# Patient Record
Sex: MALE | Race: OTHER | Employment: FULL TIME | URBAN - METROPOLITAN AREA
[De-identification: names, ages, dates, MRNs, and addresses within clinical notes are randomized per-mention and may not be internally consistent; named-entity substitution may affect disease eponyms.]

---

## 2018-05-24 PROBLEM — E66.01 OBESITY, MORBID (HCC): Status: ACTIVE | Noted: 2018-05-24

## 2019-10-13 ENCOUNTER — HOSPITAL ENCOUNTER (OUTPATIENT)
Dept: LAB | Age: 49
Discharge: HOME | End: 2019-10-13
Payer: COMMERCIAL

## 2019-10-13 LAB
ALBUMIN SERPL-MCNC: 3.6 G/DL (ref 3.5–5)
ALBUMIN/GLOB SERPL: 0.9 {RATIO} (ref 1.2–3.5)
ALP SERPL-CCNC: 85 U/L (ref 50–136)
ALT SERPL-CCNC: 59 U/L (ref 12–65)
ANION GAP SERPL CALC-SCNC: 3 MMOL/L (ref 7–16)
AST SERPL-CCNC: 32 U/L (ref 15–37)
BILIRUB SERPL-MCNC: 0.4 MG/DL (ref 0.2–1.1)
BUN SERPL-MCNC: 15 MG/DL (ref 6–23)
CALCIUM SERPL-MCNC: 8.2 MG/DL (ref 8.3–10.4)
CHLORIDE SERPL-SCNC: 108 MMOL/L (ref 98–107)
CHOLEST SERPL-MCNC: 126 MG/DL
CO2 SERPL-SCNC: 27 MMOL/L (ref 21–32)
CREAT SERPL-MCNC: 0.74 MG/DL (ref 0.8–1.5)
EST. AVERAGE GLUCOSE BLD GHB EST-MCNC: 134 MG/DL
GLOBULIN SER CALC-MCNC: 3.8 G/DL (ref 2.3–3.5)
GLUCOSE SERPL-MCNC: 115 MG/DL (ref 65–100)
HBA1C MFR BLD: 6.3 %
HDLC SERPL-MCNC: 30 MG/DL (ref 40–60)
HDLC SERPL: 4.2 {RATIO}
LDLC SERPL CALC-MCNC: 71 MG/DL
LIPID PROFILE,FLP: ABNORMAL
POTASSIUM SERPL-SCNC: 4.1 MMOL/L (ref 3.5–5.1)
PROT SERPL-MCNC: 7.4 G/DL (ref 6.3–8.2)
SODIUM SERPL-SCNC: 138 MMOL/L (ref 136–145)
TRIGL SERPL-MCNC: 125 MG/DL (ref 35–150)
TSH SERPL DL<=0.005 MIU/L-ACNC: 1.65 UIU/ML
VLDLC SERPL CALC-MCNC: 25 MG/DL (ref 6–23)

## 2019-10-13 PROCEDURE — 84443 ASSAY THYROID STIM HORMONE: CPT

## 2019-10-13 PROCEDURE — 36415 COLL VENOUS BLD VENIPUNCTURE: CPT

## 2019-10-13 PROCEDURE — 80061 LIPID PANEL: CPT

## 2019-10-13 PROCEDURE — 83036 HEMOGLOBIN GLYCOSYLATED A1C: CPT

## 2019-10-13 PROCEDURE — 80053 COMPREHEN METABOLIC PANEL: CPT

## 2019-10-22 ENCOUNTER — HOSPITAL ENCOUNTER (OUTPATIENT)
Dept: GENERAL RADIOLOGY | Age: 49
Discharge: HOME | End: 2019-10-22

## 2019-10-22 DIAGNOSIS — M25.511 ACUTE PAIN OF RIGHT SHOULDER: ICD-10-CM

## 2020-08-05 ENCOUNTER — HOSPITAL ENCOUNTER (OUTPATIENT)
Dept: GENERAL RADIOLOGY | Age: 50
Discharge: HOME | End: 2020-08-05
Attending: SURGERY

## 2020-10-20 ENCOUNTER — HOSPITAL ENCOUNTER (OUTPATIENT)
Dept: PHYSICAL THERAPY | Age: 50
Discharge: HOME | End: 2020-10-20
Attending: SURGERY
Payer: COMMERCIAL

## 2020-10-20 DIAGNOSIS — G47.33 OSA (OBSTRUCTIVE SLEEP APNEA): ICD-10-CM

## 2020-10-20 DIAGNOSIS — R73.03 PREDIABETES: ICD-10-CM

## 2020-10-20 DIAGNOSIS — Z99.89 CPAP (CONTINUOUS POSITIVE AIRWAY PRESSURE) DEPENDENCE: ICD-10-CM

## 2020-10-20 DIAGNOSIS — E66.01 MORBID OBESITY DUE TO EXCESS CALORIES (HCC): ICD-10-CM

## 2020-10-20 PROCEDURE — 97161 PT EVAL LOW COMPLEX 20 MIN: CPT

## 2020-10-20 NOTE — THERAPY EVALUATION
Wolf Gomez  : 1970  Primary: Tracy Joel Of Keyur Yarbrough*  Secondary:  [facility], [address] [place] [address],[address], [place] [address].  [phone]   [phone]          OUTPATIENT PHYSICAL THERAPY:Initial Assessment and Discharge Summary 10/20/2020   ICD-10: Treatment Diagnosis: M62.81 Muscle weakness, generalized  Precautions/Allergies:   Patient has no known allergies. TREATMENT PLAN:  Effective Dates: 10/20/2020. Frequency/Duration: 1 visit MEDICAL/REFERRING DIAGNOSIS:  Morbid obesity due to excess calories (HCC) [E66.01]  BMI 50.0-59.9, adult (HCC) [Z68.43]  Prediabetes [R73.03]  JADEN (obstructive sleep apnea) [G47.33]  CPAP (continuous positive airway pressure) dependence [Z99.89]   DATE OF ONSET: Chronic  REFERRING PHYSICIAN: Iqra Acosta*  MD Orders: Evaluate and treat  Return MD Appointment: TBD     INITIAL ASSESSMENT:  Mr. Diamond Tapia presents for physical therapy consultation in preparation for bariatric surgery in order to lose weight and improve his health. Patient demonstrates good mobility and strength, and is in the process of beginning treatment for L ankle pain from an injury earlier in the year. At this time the patient does not appear to exhibit physical deficits which necessitate ongoing PT. PROBLEM LIST (Impacting functional limitations):  1. Decreased Strength  2. Increased Pain  3. Decreased Activity Tolerance  4. Decreased [place] with Home Exercise Program INTERVENTIONS PLANNED: (Treatment may consist of any combination of the following)  1. Home Exercise Program (HEP)  2. Therapeutic Exercise/Strengthening     GOALS: (Goals have been discussed and agreed upon with patient.)  Discharge Goals: Time Frame: 1 visit  1. Patient will be independent with HEP.  MET 10-20-20    OUTCOME MEASURE:   Tool Used: Lower Extremity Functional Scale (LEFS)  Score:  Initial:  Most Recent:  (Date: -- )   Interpretation of Score: 20 questions each scored on a 5 point scale with 0 representing \"extreme difficulty or unable to perform\" and 4 representing \"no difficulty\". The lower the score, the greater the functional disability. 80/80 represents no disability. Minimal detectable change is 9 points. MEDICAL NECESSITY:   · Patient does not demonstrate deficits which require ongoing PT services. REASON FOR SERVICES/OTHER COMMENTS:  · Patient does not appear to need additional PT services at this time. Total Duration: 30 minutes  PT Patient Time In/Time Out  Time In: 1030  Time Out: 1100    Rehabilitation Potential For Stated Goals: Good  Regarding [employer] therapy, I certify that the treatment plan above will be carried out by a therapist or under their direction. Thank you for this referral,  Alexis Romo, PT     Referring Physician Signature: Cheryl Isaac* No Signature is Required for this note. PAIN/SUBJECTIVE:   Initial:   None reported Post Session:  No pain reported   HISTORY:   History of Injury/Illness (Reason for Referral):        Mr. Manan Leigh injured his ankle in June 2020, and has an MRI ordered for his ankle. He reports that it hurts intermittently, and also has some on and off low back pain. Is motivated to have bariatric surgery in order to improve his health. States that he used to exercise regularly but since moving to the [address] [place] [address],[address] from [facility] has not been able to exercise as he used to. Past Medical History/Comorbidities:   Mr. Manan Leigh  has a past medical history of Hypercholesterolemia, Prediabetes, and Sleep apnea. Mr. Manan Leigh  has no past surgical history on file. Social History/Living Environment:    Patient lives with his wife in a [facility] house. Prior Level of Function/Work/Activity:  Patient works full-timein [employer].       Ambulatory/Rehab Services H2 Model Falls Risk Assessment   Risk Factors:       (1)  Gender [Male] Ability to Rise from Chair:       (0)  Ability to rise in a single movement   Falls Prevention Plan:       No modifications necessary   Total: (5 or greater = High Risk): 1   ©2010 [facility] [employer] Patent #2,[id number]. Federal Law prohibits the replication, distribution or use without written permission from [facility] [employer]   Current Medications: Per communication with patient      Current Outpatient Medications:     fluconazole (DIFLUCAN) 150 mg tablet, Take 1 tablet now, may repeat 1 more tablet in 1 week if not clear., Disp: 2 Tab, Rfl: 0.- patient not taking    SITagliptin-metFORMIN (JANUMET)  mg per tablet, Take 1 Tab by mouth two (2) times daily (with meals). Indications: type 2 diabetes mellitus, Disp: 180 Tab, Rfl: 3    acetaminophen (TYLENOL EXTRA STRENGTH) 500 mg tablet, Take  by mouth every six (6) hours as needed for Pain., Disp: , Rfl:     naproxen (NAPROSYN) 500 mg tablet, Take 1 Tab by mouth two (2) times daily as needed for Pain. Indications: pain, Disp: 60 Tab, Rfl: 1    FLUoxetine (PROZAC) 10 mg capsule, Take 1 Cap by mouth daily. Indications: Anxiousness associated with Depression, Disp: 90 Cap, Rfl: 0 - patient not taking    Date Last Reviewed:  10-20-20   Number of Personal Factors/Comorbidities that affect the Plan of Care: 1-2: MODERATE COMPLEXITY   EXAMINATION:   10-    Observation/Orthostatic Postural Assessment:          Patient ambulates without presence of antalgic gait pattern. Performs all transfers independently without the use of hands to stand. Ascends/descends stairs with reciprocal gait pattern without evidence of antalgia. ROM:          B UE and B LE ROM is within functional limits. Low back range of motion is within functional limits with both forward and back bending.    Strength:          B shoulder flexion 5/5        B shoulder ABD 5/5        B elbow flex/ext 5/5        B hip flexion 5/5        B knee ext 5/5        B knee flex 5/5     Body Structures Involved:  1. Nerves  2. Bones  3. Joints  4. Muscles Body Functions Affected:  1. Sensory/Pain  2. Neuromusculoskeletal  3. Movement Related Activities and Participation Affected:  1. General Tasks and Demands  2.  Mobility   Number of elements (examined above) that affect the Plan of Care: 4+: HIGH COMPLEXITY   CLINICAL PRESENTATION:   Presentation: Stable and uncomplicated: LOW COMPLEXITY   CLINICAL DECISION MAKING:   Use of outcome tool(s) and clinical judgement create a POC that gives a: Clear prediction of patient's progress: LOW COMPLEXITY

## 2020-11-23 ENCOUNTER — HOSPITAL ENCOUNTER (OUTPATIENT)
Dept: LAB | Age: 50
Discharge: HOME | End: 2020-11-23
Attending: SURGERY
Payer: COMMERCIAL

## 2020-11-23 DIAGNOSIS — R73.03 PREDIABETES: ICD-10-CM

## 2020-11-23 DIAGNOSIS — G47.33 OSA (OBSTRUCTIVE SLEEP APNEA): ICD-10-CM

## 2020-11-23 DIAGNOSIS — Z99.89 CPAP (CONTINUOUS POSITIVE AIRWAY PRESSURE) DEPENDENCE: ICD-10-CM

## 2020-11-23 DIAGNOSIS — E66.01 MORBID OBESITY DUE TO EXCESS CALORIES (HCC): ICD-10-CM

## 2020-11-23 LAB
EST. AVERAGE GLUCOSE BLD GHB EST-MCNC: 134 MG/DL
HBA1C MFR BLD: 6.3 %

## 2020-11-23 PROCEDURE — 80323 ALKALOIDS NOS: CPT

## 2020-11-23 PROCEDURE — 36415 COLL VENOUS BLD VENIPUNCTURE: CPT

## 2020-11-23 PROCEDURE — 82306 VITAMIN D 25 HYDROXY: CPT

## 2020-11-23 PROCEDURE — 83036 HEMOGLOBIN GLYCOSYLATED A1C: CPT

## 2020-11-24 LAB — 25(OH)D3+25(OH)D2 SERPL-MCNC: 22.9 NG/ML (ref 30–100)

## 2020-11-30 LAB
COTININE SERPL-MCNC: NORMAL NG/ML
NICOTINE SERPL-MCNC: NORMAL NG/ML

## 2020-12-07 NOTE — H&P (VIEW-ONLY)
Jairo Connor MD  
Bariatric & Advanced Laparoscopic Surgery & Endoscopy [address] [facility] [address], [address] Federico Farrell Phone [phone]   Fax [phone] Date of visit: 2020 Primary/Requesting provider: Guss Rubinstein, MD  
 
 
Name: Luna Andrade MRN: [id number] : 1970 Age: 45 y.o. Sex: male PCP: Guss Rubinstein, MD  
 
CC:   
Chief Complaint Patient presents with  Morbid Obesity  New Patient Procedure: laparoscopic sleeve gastrectomy Surgical Consult Date: 2020 Surgical Date: TBD The patient is a 45 y.o. male presenting with a height of 5' 6\" (1.676 m) and weight of 286 lb (129.7 kg), giving him a Body mass index is 46.16 kg/m². He has an Ideal body weight of 145 lbs, and excess body weight of 141 lbs. He started our program with a weight of 315 lbs, losing 29 lbs. He has completed all aspects of our prep program and has been deemed an acceptable candidate for bariatric surgery. 30-Day Bariatric Surgical Risk Percentage: 2.75% PSYCHOLOGICAL EVALUATION:   Completed with JAY Metzger on 2020 deeming him and appropriate surgical candidate. DIETITIAN EVALUATION:  Completed with Yamileth Nuno deeming him an appropriate surgical candidate. BARIATRIC LABS:   
Component Latest Ref Rng & Units 2020  
 
     11:25 AM 11:25 AM 11:00 AM  
Nicotine 
    ng/mL None detected Cotinine 
    ng/mL None detected Hemoglobin A1c, (calculated) 
    %   6.3 Est. average glucose 
    mg/dL   134 Vitamin D 25-Hydroxy 30.0 - 100.0 ng/mL  22.9 (L) Will Collect TSH on Preassessment visit UPPER GI:  Completed 09/10/2020 by [address] [place] [address]. Results scanned into imaging section of chart, Unremarkable UGI. PHYSICAL THERAPY EVALUATION FOR MOBILITY OPTIMIZATION:   Completed 10/20/2020 by Corinne Rodríguez PT, documentation in note section on chart. We have reviewed the procedure again today and completed our standard pre op teaching. His questions were answered and he is ready to schedule surgery. We have discussed the procedure, diet and exercise regimens, and potential complications of surgery. The patient understands the nature and potential complication of surgery and has the capacity to follow the post operative diet, exercise, and nutritional requirements. After review, he has signed his surgical consent today. MEDICAL HISTORY: 
Morbid Obesity Migraines Varicose veins 
  
Comorbidity Yes or No  
Obstructive Sleep Apnea CPAP/BIPAP use=CPAP Yes Diabetes Mellitus Insulin dependent = Last A1c= No-Prediabetes Hypertension- 
Number of medications= No  
Gastroesophageal Reflux Treatment Med= No  
Hyperlipidemia with medication No  
Coronary Artery Disease No  
Cancer No  
Asthma No  
Osteoarthritis No  
Joint Pain No  
  
No GERD. No Dysphagia. No Regurgitation.  
  
Other Yes or No  
Venous Stasis No  
Dialysis No  
Long term use of steroids or immunocompromised conditions No  
On Anticoagulants No  
  
PRIOR WEIGHT LOSS ATTEMPTS:  4-10 attempts including weight loss solutions, bariatric clinic 
  
EXERCISE ASSESSMENT:  None currently, will discuss NIH Guidelines with patient.  
  
DENTAL ISSUES:  No dental issues with chewing, missing or broken teeth 
  
PSYCHOSOCIAL: 
He notes he  is  and states main support person is Obed Leblanc (wife). He is employed as a Operator of . His goal in pursuing surgical weight loss is to improve health. He  Does not report appropriate treatment of depression and anxiety.   He states he is independent in his care, can drive a car, and can ambulate without assistance. 
  
 
 Patient has a long term history of obesity with multiple failed attempts at weight reduction. Patient denies any changes in health history or physical health since last visit. Patient has been adherent to his diet and exercise regimen. Patient feels that he is well informed regarding his bariatric surgery choice and would like to proceed with a laparoscopic sleeve gastrectomy for weight reduction, improvement of his medical conditions, and improved quality of life. Patient verbalized understanding of the risks associated with bariatric surgery. Patient also verbalized understanding that bariatric surgery is a tool and that weight reduction is dependent on behavioral changes in regards to what he eats and how much. PMH: 
 
Past Medical History:  
Diagnosis Date  Hypercholesterolemia  Prediabetes  Sleep apnea PSH: 
 
No past surgical history on file. MEDS: 
 
Current Outpatient Medications Medication Sig  
 omeprazole (PRILOSEC) 40 mg capsule Take 1 Cap by mouth daily. Indications: gastroesophageal reflux disease  oxyCODONE-acetaminophen (Percocet) 5-325 mg per tablet Take 1 Tab by mouth every six (6) hours as needed for Pain for up to 5 days. Max Daily Amount: 4 Tabs.  ondansetron hcl (ZOFRAN) 8 mg tablet Take 1 Tab by mouth every eight (8) hours as needed for Nausea or Vomiting. Indications: prevent nausea and vomiting after surgery  sucralfate (Carafate) 1 gram tablet Take 1 Tab by mouth four (4) times daily. Indications: gastroesophageal reflux disease  fluconazole (DIFLUCAN) 150 mg tablet Take 1 tablet now, may repeat 1 more tablet in 1 week if not clear.  SITagliptin-metFORMIN (JANUMET)  mg per tablet Take 1 Tab by mouth two (2) times daily (with meals). Indications: type 2 diabetes mellitus  acetaminophen (TYLENOL EXTRA STRENGTH) 500 mg tablet Take  by mouth every six (6) hours as needed for Pain.  naproxen (NAPROSYN) 500 mg tablet Take 1 Tab by mouth two (2) times daily as needed for Pain. Indications: pain  FLUoxetine (PROZAC) 10 mg capsule Take 1 Cap by mouth daily. Indications: Anxiousness associated with Depression No current facility-administered medications for this visit. ALLERGIES:   
 
No Known Allergies SH: Social History Tobacco Use  Smoking status: Former Smoker Years: 4.00 Last attempt to quit: 2016 Years since quittin.5  Smokeless tobacco: Former User Substance Use Topics  Alcohol use: No  
 Drug use: No  
 
 
FH: 
 
Family History Problem Relation Age of Onset [facility] Arthritis-osteo Mother  No Known Problems Father Physical Exam:  
 
Visit Vitals BP (!) 156/99 Pulse 76 Ht 5' 6\" (1.676 m) Wt 286 lb (129.7 kg) BMI 46.16 kg/m² General:  Well-developed, well-nourished, no distress. Psych:  Cooperative, good insight and judgement. Neuro:  Alert, oriented to person, place and time. HEENT:  Normocephalic, atraumatic. Sclera clear. Lungs:  Unlabored breathing. Symmetrical chest expansion. Chest wall:  No tenderness or deformity. Heart:  Regular rate and rhythm. No JVD. Abdomen:  Soft, obese, non-tender, non-distended. No guarding or rebound. No palpable masses. Extremities:  Extremities normal, atraumatic, no cyanosis or edema. Skin:  Skin color, texture, turgor normal. No rashes. Labs: All recent labs were reviewed. Imaging:  images were independently reviewed by me. ICD-10-CM ICD-9-CM 1. Acute post-operative pain  G89.18 338.18 oxyCODONE-acetaminophen (Percocet) 5-325 mg per tablet 2. Post-operative nausea and vomiting  R11.2 787.01 ondansetron hcl (ZOFRAN) 8 mg tablet Z98.890    
3. Gastroesophageal reflux disease, unspecified whether esophagitis present  K21.9 530.81 omeprazole (PRILOSEC) 40 mg capsule  
   sucralfate (Carafate) 1 gram tablet 4. Dietary counseling  Z71.3 V65.3 5. Morbid obesity due to excess calories (HCC)  E66.01 278.01   
6. Prediabetes  R73.03 790.29   
7. JADEN (obstructive sleep apnea)  G47.33 327.23   
8. CPAP (continuous positive airway pressure) dependence  Z99.89 V46.8 9. BMI 45.0-49.9, adult ([facility])  Z68.42 V85.42 Assessment/Plan:  Lourdes Pearl is a 45 y.o. male is here her preoperative visit prior to bariatric surgery. 1. Patient is an excellent candidate for bariatric surgery and meets NIH criteria for weight loss surgery. Patient has clear medical necessity for bariatric surgery. Patient is well informed, motivated, and has a strong desire for weight loss. 2. In detail, discussed risks and benefits of laparoscopic sleeve gastrectomy. We discussed specific risks of sleeve gastrectomy including but not limited to: pain, infection, bleeding, scar, excess skin, conversion to open approach, hernia, injury to adjacent organs, need for blood transfusion, thromboembolic complications, gastrointestinal leak, stricture, difficulty swallowing, need for revisional surgery, gastroesophageal reflux, esophagitis or esophageal cancer, need for revisional surgery, failure to lose weight or weight regain, nutritional deficiencies, heart attack, stroke, death. 3. Discussed in detail information and expectations regarding the pre-operative period, the bariatric procedure, and postoperative period. 4. The patient has reviewed, completed, and signed consent that he has viewed all pre operative teaching videos. 5. Stop NSAIDS 7 days prior to surgery date. 6.  May continue Aspirin 81 mg po until day before surgery. 7.  RX for Prilosec, Zofran, Percocet, and Carafate. 8.  Reviewed post op follow up appointment schedule. Patient referred to  to schedule post op follow up visits 1.5 weeks, 3 weeks, 3 months, 6 months, and 12 months. 5. Stressed with patient importance of understanding bariatric surgery is a tool and will not work if patient does not continue with healthy lifestyle changes including regular exercise and high protein, low calorie, low carb diet. 10. The dietitian reviewed pre-op diet including high protein diet, sugar/calorie free liquids for 2 weeks prior. Liquids only the day before surgery. Full Liquid Diet - from day of discharge from hospital until first [address] visit, usually 1.5 weeks. 11. The dietitian reviewed 2 weeks post op diet full liquids. 12.  Discussed anticipated recovery time off from work. Explained to patient this may vary depending on the patient's overall health and personal recovery. Madisyn-en-y Gastric Bypass 2-4 weeks; Gastric Sleeve 1-2 weeks. 14.  Reviewed assessment and plan in detail. Patient verbalized understanding. Questions answered. WEIGHT MANAGEMENT: 
1. Counseled on weight management and weight loss. 2. Instructed on choosing better foods with alternatives. 3. Advised on low carbohydrate, high protein diet (at least 60 to 80 gm protein daily). 4.  Positive reinforcement provided. I spent greater than 50% of this 40 minutes visit in counseling and coordination of care. Signed: Percy Mora MD 
Bariatric & Minimally Invasive Surgery 12/8/2020

## 2020-12-18 ENCOUNTER — HOSPITAL ENCOUNTER (OUTPATIENT)
Dept: SURGERY | Age: 50
Discharge: HOME | End: 2020-12-18
Attending: SURGERY
Payer: COMMERCIAL

## 2020-12-18 VITALS
OXYGEN SATURATION: 95 % | BODY MASS INDEX: 45.61 KG/M2 | SYSTOLIC BLOOD PRESSURE: 160 MMHG | HEART RATE: 67 BPM | RESPIRATION RATE: 16 BRPM | TEMPERATURE: 98.2 F | DIASTOLIC BLOOD PRESSURE: 80 MMHG | WEIGHT: 283.8 LBS | HEIGHT: 66 IN

## 2020-12-18 LAB
ALBUMIN SERPL-MCNC: 3.9 G/DL (ref 3.5–5)
ALBUMIN/GLOB SERPL: 1 {RATIO} (ref 1.2–3.5)
ALP SERPL-CCNC: 83 U/L (ref 50–136)
ALT SERPL-CCNC: 61 U/L (ref 12–65)
ANION GAP SERPL CALC-SCNC: 6 MMOL/L (ref 7–16)
AST SERPL-CCNC: 28 U/L (ref 15–37)
BILIRUB SERPL-MCNC: 0.5 MG/DL (ref 0.2–1.1)
BUN SERPL-MCNC: 15 MG/DL (ref 6–23)
CALCIUM SERPL-MCNC: 8.5 MG/DL (ref 8.3–10.4)
CHLORIDE SERPL-SCNC: 105 MMOL/L (ref 98–107)
CO2 SERPL-SCNC: 28 MMOL/L (ref 21–32)
CREAT SERPL-MCNC: 0.73 MG/DL (ref 0.8–1.5)
ERYTHROCYTE [DISTWIDTH] IN BLOOD BY AUTOMATED COUNT: 14 % (ref 11.9–14.6)
GLOBULIN SER CALC-MCNC: 3.9 G/DL (ref 2.3–3.5)
GLUCOSE SERPL-MCNC: 101 MG/DL (ref 65–100)
HCT VFR BLD AUTO: 45.7 % (ref 41.1–50.3)
HGB BLD-MCNC: 14.9 G/DL (ref 13.6–17.2)
MCH RBC QN AUTO: 26 PG (ref 26.1–32.9)
MCHC RBC AUTO-ENTMCNC: 32.6 G/DL (ref 31.4–35)
MCV RBC AUTO: 79.9 FL (ref 79.6–97.8)
NRBC # BLD: 0 K/UL (ref 0–0.2)
PLATELET # BLD AUTO: 159 K/UL (ref 150–450)
PMV BLD AUTO: 11.7 FL (ref 9.4–12.3)
POTASSIUM SERPL-SCNC: 4 MMOL/L (ref 3.5–5.1)
PROT SERPL-MCNC: 7.8 G/DL (ref 6.3–8.2)
RBC # BLD AUTO: 5.72 M/UL (ref 4.23–5.6)
SODIUM SERPL-SCNC: 139 MMOL/L (ref 136–145)
WBC # BLD AUTO: 6.7 K/UL (ref 4.3–11.1)

## 2020-12-18 PROCEDURE — 85027 COMPLETE CBC AUTOMATED: CPT

## 2020-12-18 PROCEDURE — 80053 COMPREHEN METABOLIC PANEL: CPT

## 2020-12-18 PROCEDURE — 77030027138 HC INCENT SPIROMETER -A

## 2020-12-18 PROCEDURE — 36415 COLL VENOUS BLD VENIPUNCTURE: CPT

## 2020-12-18 NOTE — PERIOP NOTES
PLEASE CONTINUE TAKING ALL PRESCRIPTION MEDICATIONS UP TO THE DAY OF SURGERY UNLESS OTHERWISE DIRECTED BELOW. DISCONTINUE all vitamins and supplements 7 days prior to surgery. DISCONTINUE Non-Steriodal Anti-Inflammatory (NSAIDS) such as Advil and Aleve 5 days prior to surgery. Home Medications to take  the day of surgery   None             Home Medications   to Hold   None         Comments          *Visitor policy of 1 visitor per patient discussed. Please do not bring home medications with you on the day of surgery unless otherwise directed by your nurse. If you are instructed to bring home medications, please give them to your nurse as they will be administered by the nursing staff. If you have any questions, please call [facility] [phone]. A copy of this note was provided to the patient for reference.

## 2020-12-18 NOTE — PERIOP NOTES
SURGICAL WEIGHT LOSS Enhanced Recovery After Surgery: diabetic and non-diabetic patients      The night before surgery 12/21/20, drink 1 bottles of the Ensure Pre-Surgery drink. The morning of surgery 12/22/20, drink 1/2 bottle of the Ensure Pre-Surgery drink while on your way to the hospital. Drink this over 5-10 minutes. Drink nothing else after drinking the pre-surgical drink the morning of surgery. Bring your patient handbook with you to the hospital.        Things to Remember:      1. You will be up in a chair the evening of surgery and sipping on clear liquids, once released by your surgeon. 2. Beginning the day of surgery, you will be out of the bed as able, once released by your hospital team. We encourage you to be sitting up in a chair, walking in the qureshi, doing exercises as advised by physical therapy, etc.       3. You will be given scheduled non-narcotic pain medication to help keep your pain under control. You will have stronger pain medication ordered for break through pain. 4. All of these measures are geared toward improving your overall surgical recovery and   decreasing the risk of complications.

## 2020-12-18 NOTE — PERIOP NOTES
Patient verified name and  via [id number] [email or web address] video  [id number]    Order for consent found in EHR and matches case posting; patient verified. Laparoscopic Sleeve Gastrectomy possible Open with Intraoperative EGD    Type 2 surgery, PAT walk in assessment complete. Labs per surgeon: CBC and CMP; results pending. Labs per anesthesia protocol: no additional lab work needed. EKG: none needed per anesthesia guidelines. Negative Covid test dated 20 found in EHR. Hospital approved surgical skin cleanser and instructions given per hospital policy. Patient provided with and instructed on educational handouts including Guide to Surgery, Pain Management, Hand Hygiene, Blood Transfusion Education, and [facility] Brochure. Patient answered medical/surgical history questions at their best of ability. All prior to admission medications documented in [facility]. Original medication prescription bottle not visualized during patient appointment. Patient instructed to hold all vitamins 7 days prior to surgery and NSAIDS 5 days prior to surgery, patient verbalized understanding. Patient teach back successful and patient demonstrates knowledge of instructions.

## 2020-12-21 ENCOUNTER — ANESTHESIA EVENT (OUTPATIENT)
Dept: SURGERY | Age: 50
End: 2020-12-21
Payer: COMMERCIAL

## 2020-12-21 NOTE — PROGRESS NOTES
Hospital  will be available to assist over-the-phone on day of procedure. Please call [phone] for assistance.  Thank you,     Yanique Miller ([facility])  [address] [place] [address] [place]  [phone] (phone)

## 2020-12-22 ENCOUNTER — HOSPITAL ENCOUNTER (OUTPATIENT)
Age: 50
Discharge: HOME | End: 2020-12-23
Attending: SURGERY | Admitting: SURGERY
Payer: COMMERCIAL

## 2020-12-22 ENCOUNTER — ANESTHESIA (OUTPATIENT)
Dept: SURGERY | Age: 50
End: 2020-12-22
Payer: COMMERCIAL

## 2020-12-22 DIAGNOSIS — E66.01 OBESITY, MORBID (HCC): ICD-10-CM

## 2020-12-22 LAB
ABO + RH BLD: NORMAL
BLOOD GROUP ANTIBODIES SERPL: NORMAL
SPECIMEN EXP DATE BLD: NORMAL
TSH SERPL DL<=0.005 MIU/L-ACNC: 1.51 UIU/ML

## 2020-12-22 PROCEDURE — 74011250636 HC RX REV CODE- 250/636: Performed by: ANESTHESIOLOGY

## 2020-12-22 PROCEDURE — 43775 LAP SLEEVE GASTRECTOMY: CPT | Performed by: SURGERY

## 2020-12-22 PROCEDURE — 94760 N-INVAS EAR/PLS OXIMETRY 1: CPT

## 2020-12-22 PROCEDURE — 76210000016 HC OR PH I REC 1 TO 1.5 HR: Performed by: SURGERY

## 2020-12-22 PROCEDURE — 74011250636 HC RX REV CODE- 250/636: Performed by: SURGERY

## 2020-12-22 PROCEDURE — 74011250637 HC RX REV CODE- 250/637: Performed by: NURSE PRACTITIONER

## 2020-12-22 PROCEDURE — 97161 PT EVAL LOW COMPLEX 20 MIN: CPT

## 2020-12-22 PROCEDURE — 84443 ASSAY THYROID STIM HORMONE: CPT

## 2020-12-22 PROCEDURE — 77030027876 HC STPLR ENDOSC FLX PWR J&J -G1: Performed by: SURGERY

## 2020-12-22 PROCEDURE — 88305 TISSUE EXAM BY PATHOLOGIST: CPT

## 2020-12-22 PROCEDURE — 77030008606 HC TRCR ENDOSC KII AMR -B: Performed by: SURGERY

## 2020-12-22 PROCEDURE — 74011000250 HC RX REV CODE- 250: Performed by: NURSE ANESTHETIST, CERTIFIED REGISTERED

## 2020-12-22 PROCEDURE — 77030009968 HC RELD STPLR ENDOSC J&J -D: Performed by: SURGERY

## 2020-12-22 PROCEDURE — 74011250636 HC RX REV CODE- 250/636: Performed by: NURSE PRACTITIONER

## 2020-12-22 PROCEDURE — 65270000029 HC RM PRIVATE

## 2020-12-22 PROCEDURE — 88312 SPECIAL STAINS GROUP 1: CPT

## 2020-12-22 PROCEDURE — 86900 BLOOD TYPING SEROLOGIC ABO: CPT

## 2020-12-22 PROCEDURE — 2709999900 HC NON-CHARGEABLE SUPPLY

## 2020-12-22 PROCEDURE — 77030003578 HC NDL INSUF VERES AMR -B: Performed by: SURGERY

## 2020-12-22 PROCEDURE — 77030000038 HC TIP SCIS LAPSCP SURI -B: Performed by: SURGERY

## 2020-12-22 PROCEDURE — 77030010507 HC ADH SKN DERMBND J&J -B: Performed by: SURGERY

## 2020-12-22 PROCEDURE — 77030040361 HC SLV COMPR DVT MDII -B: Performed by: SURGERY

## 2020-12-22 PROCEDURE — 77030002933 HC SUT MCRYL J&J -A: Performed by: SURGERY

## 2020-12-22 PROCEDURE — 77030007956 HC PCH ENDOSC SPEC COVD -C: Performed by: SURGERY

## 2020-12-22 PROCEDURE — 74011250637 HC RX REV CODE- 250/637: Performed by: ANESTHESIOLOGY

## 2020-12-22 PROCEDURE — 76010000171 HC OR TIME 2 TO 2.5 HR INTENSV-TIER 1: Performed by: SURGERY

## 2020-12-22 PROCEDURE — 74011250636 HC RX REV CODE- 250/636: Performed by: NURSE ANESTHETIST, CERTIFIED REGISTERED

## 2020-12-22 PROCEDURE — 77030002967 HC SUT PDS J&J -B: Performed by: SURGERY

## 2020-12-22 PROCEDURE — 77030031139 HC SUT VCRL2 J&J -A: Performed by: SURGERY

## 2020-12-22 PROCEDURE — 2709999900 HC NON-CHARGEABLE SUPPLY: Performed by: SURGERY

## 2020-12-22 PROCEDURE — 77030008437 HC REINF STRP REINF SEMGD WLGO -C: Performed by: SURGERY

## 2020-12-22 PROCEDURE — 77030008518 HC TBNG INSUF ENDO STRY -B: Performed by: SURGERY

## 2020-12-22 PROCEDURE — 77030008756 HC TU IRR SUC STRY -B: Performed by: SURGERY

## 2020-12-22 PROCEDURE — 77030008023 HC RELD SUT ENDOSC COVD -B: Performed by: SURGERY

## 2020-12-22 PROCEDURE — 97110 THERAPEUTIC EXERCISES: CPT

## 2020-12-22 PROCEDURE — 77030037088 HC TUBE ENDOTRACH ORAL NSL COVD-A: Performed by: ANESTHESIOLOGY

## 2020-12-22 PROCEDURE — 77030008771 HC TU NG SALEM SUMP -A: Performed by: ANESTHESIOLOGY

## 2020-12-22 PROCEDURE — 77010033678 HC OXYGEN DAILY

## 2020-12-22 PROCEDURE — 77030009957 HC RELD ENDOSTCH COVD -C: Performed by: SURGERY

## 2020-12-22 PROCEDURE — 76060000035 HC ANESTHESIA 2 TO 2.5 HR: Performed by: SURGERY

## 2020-12-22 PROCEDURE — 74011000250 HC RX REV CODE- 250: Performed by: SURGERY

## 2020-12-22 PROCEDURE — 77030040922 HC BLNKT HYPOTHRM STRY -A: Performed by: ANESTHESIOLOGY

## 2020-12-22 PROCEDURE — 77030034154 HC SHR COAG HARM ACE J&J -F: Performed by: SURGERY

## 2020-12-22 PROCEDURE — 77030010515 HC APPL ENDOCLP LIG J&J -B: Performed by: SURGERY

## 2020-12-22 PROCEDURE — 77030021678 HC GLIDESCP STAT DISP VERT -B: Performed by: ANESTHESIOLOGY

## 2020-12-22 RX ORDER — LIDOCAINE HYDROCHLORIDE 20 MG/ML
INJECTION, SOLUTION EPIDURAL; INFILTRATION; INTRACAUDAL; PERINEURAL AS NEEDED
Status: DISCONTINUED | OUTPATIENT
Start: 2020-12-22 | End: 2020-12-22 | Stop reason: HOSPADM

## 2020-12-22 RX ORDER — OXYCODONE HYDROCHLORIDE 5 MG/1
10 TABLET ORAL
Status: DISCONTINUED | OUTPATIENT
Start: 2020-12-22 | End: 2020-12-22 | Stop reason: HOSPADM

## 2020-12-22 RX ORDER — ROCURONIUM BROMIDE 10 MG/ML
INJECTION, SOLUTION INTRAVENOUS AS NEEDED
Status: DISCONTINUED | OUTPATIENT
Start: 2020-12-22 | End: 2020-12-22 | Stop reason: HOSPADM

## 2020-12-22 RX ORDER — OXYCODONE HYDROCHLORIDE 5 MG/1
5 TABLET ORAL
Status: DISCONTINUED | OUTPATIENT
Start: 2020-12-22 | End: 2020-12-22 | Stop reason: HOSPADM

## 2020-12-22 RX ORDER — ONDANSETRON 2 MG/ML
4 INJECTION INTRAMUSCULAR; INTRAVENOUS ONCE
Status: DISCONTINUED | OUTPATIENT
Start: 2020-12-22 | End: 2020-12-22 | Stop reason: HOSPADM

## 2020-12-22 RX ORDER — DIPHENHYDRAMINE HYDROCHLORIDE 50 MG/ML
12.5 INJECTION, SOLUTION INTRAMUSCULAR; INTRAVENOUS
Status: DISCONTINUED | OUTPATIENT
Start: 2020-12-22 | End: 2020-12-22 | Stop reason: HOSPADM

## 2020-12-22 RX ORDER — FENTANYL CITRATE 50 UG/ML
100 INJECTION, SOLUTION INTRAMUSCULAR; INTRAVENOUS ONCE
Status: DISCONTINUED | OUTPATIENT
Start: 2020-12-22 | End: 2020-12-22 | Stop reason: HOSPADM

## 2020-12-22 RX ORDER — SUCRALFATE 1 G/1
1 TABLET ORAL 4 TIMES DAILY
Status: DISCONTINUED | OUTPATIENT
Start: 2020-12-22 | End: 2020-12-23 | Stop reason: HOSPADM

## 2020-12-22 RX ORDER — LIDOCAINE HYDROCHLORIDE ANHYDROUS AND DEXTROSE MONOHYDRATE .4; 5 G/100ML; G/100ML
1 INJECTION, SOLUTION INTRAVENOUS CONTINUOUS
Status: DISCONTINUED | OUTPATIENT
Start: 2020-12-22 | End: 2020-12-22 | Stop reason: SDUPTHER

## 2020-12-22 RX ORDER — SODIUM CHLORIDE 9 MG/ML
INJECTION, SOLUTION INTRAVENOUS
Status: DISCONTINUED | OUTPATIENT
Start: 2020-12-22 | End: 2020-12-22 | Stop reason: HOSPADM

## 2020-12-22 RX ORDER — PROPOFOL 10 MG/ML
INJECTION, EMULSION INTRAVENOUS AS NEEDED
Status: DISCONTINUED | OUTPATIENT
Start: 2020-12-22 | End: 2020-12-22 | Stop reason: HOSPADM

## 2020-12-22 RX ORDER — METOCLOPRAMIDE 10 MG/1
5 TABLET ORAL ONCE
Status: COMPLETED | OUTPATIENT
Start: 2020-12-22 | End: 2020-12-22

## 2020-12-22 RX ORDER — KETAMINE HYDROCHLORIDE 50 MG/ML
INJECTION, SOLUTION INTRAMUSCULAR; INTRAVENOUS AS NEEDED
Status: DISCONTINUED | OUTPATIENT
Start: 2020-12-22 | End: 2020-12-22 | Stop reason: HOSPADM

## 2020-12-22 RX ORDER — ALBUTEROL SULFATE 0.83 MG/ML
2.5 SOLUTION RESPIRATORY (INHALATION) AS NEEDED
Status: DISCONTINUED | OUTPATIENT
Start: 2020-12-22 | End: 2020-12-22 | Stop reason: HOSPADM

## 2020-12-22 RX ORDER — ACETAMINOPHEN 500 MG
1000 TABLET ORAL EVERY 6 HOURS
Status: DISCONTINUED | OUTPATIENT
Start: 2020-12-22 | End: 2020-12-23 | Stop reason: HOSPADM

## 2020-12-22 RX ORDER — ONDANSETRON 2 MG/ML
INJECTION INTRAMUSCULAR; INTRAVENOUS AS NEEDED
Status: DISCONTINUED | OUTPATIENT
Start: 2020-12-22 | End: 2020-12-22 | Stop reason: HOSPADM

## 2020-12-22 RX ORDER — OXYCODONE HYDROCHLORIDE 5 MG/1
5 TABLET ORAL
Status: DISCONTINUED | OUTPATIENT
Start: 2020-12-22 | End: 2020-12-23 | Stop reason: HOSPADM

## 2020-12-22 RX ORDER — LIDOCAINE HYDROCHLORIDE ANHYDROUS AND DEXTROSE MONOHYDRATE .4; 5 G/100ML; G/100ML
INJECTION, SOLUTION INTRAVENOUS
Status: DISCONTINUED | OUTPATIENT
Start: 2020-12-22 | End: 2020-12-22 | Stop reason: HOSPADM

## 2020-12-22 RX ORDER — EPHEDRINE SULFATE/0.9% NACL/PF 50 MG/5 ML
SYRINGE (ML) INTRAVENOUS AS NEEDED
Status: DISCONTINUED | OUTPATIENT
Start: 2020-12-22 | End: 2020-12-22 | Stop reason: HOSPADM

## 2020-12-22 RX ORDER — LIDOCAINE HYDROCHLORIDE 10 MG/ML
0.1 INJECTION INFILTRATION; PERINEURAL AS NEEDED
Status: DISCONTINUED | OUTPATIENT
Start: 2020-12-22 | End: 2020-12-22 | Stop reason: HOSPADM

## 2020-12-22 RX ORDER — PANTOPRAZOLE SODIUM 40 MG/10ML
40 INJECTION, POWDER, LYOPHILIZED, FOR SOLUTION INTRAVENOUS DAILY
Status: DISCONTINUED | OUTPATIENT
Start: 2020-12-22 | End: 2020-12-22 | Stop reason: RX

## 2020-12-22 RX ORDER — SODIUM CHLORIDE, SODIUM LACTATE, POTASSIUM CHLORIDE, CALCIUM CHLORIDE 600; 310; 30; 20 MG/100ML; MG/100ML; MG/100ML; MG/100ML
125 INJECTION, SOLUTION INTRAVENOUS CONTINUOUS
Status: DISCONTINUED | OUTPATIENT
Start: 2020-12-22 | End: 2020-12-23 | Stop reason: HOSPADM

## 2020-12-22 RX ORDER — NEOSTIGMINE METHYLSULFATE 1 MG/ML
INJECTION, SOLUTION INTRAVENOUS AS NEEDED
Status: DISCONTINUED | OUTPATIENT
Start: 2020-12-22 | End: 2020-12-22 | Stop reason: HOSPADM

## 2020-12-22 RX ORDER — HYDROMORPHONE HYDROCHLORIDE 2 MG/ML
0.5 INJECTION, SOLUTION INTRAMUSCULAR; INTRAVENOUS; SUBCUTANEOUS
Status: DISCONTINUED | OUTPATIENT
Start: 2020-12-22 | End: 2020-12-22 | Stop reason: HOSPADM

## 2020-12-22 RX ORDER — APREPITANT 40 MG/1
40 CAPSULE ORAL ONCE
Status: COMPLETED | OUTPATIENT
Start: 2020-12-22 | End: 2020-12-22

## 2020-12-22 RX ORDER — SODIUM CHLORIDE, SODIUM LACTATE, POTASSIUM CHLORIDE, CALCIUM CHLORIDE 600; 310; 30; 20 MG/100ML; MG/100ML; MG/100ML; MG/100ML
100 INJECTION, SOLUTION INTRAVENOUS CONTINUOUS
Status: DISCONTINUED | OUTPATIENT
Start: 2020-12-22 | End: 2020-12-22 | Stop reason: HOSPADM

## 2020-12-22 RX ORDER — GLYCOPYRROLATE 0.2 MG/ML
INJECTION INTRAMUSCULAR; INTRAVENOUS AS NEEDED
Status: DISCONTINUED | OUTPATIENT
Start: 2020-12-22 | End: 2020-12-22 | Stop reason: HOSPADM

## 2020-12-22 RX ORDER — NALOXONE HYDROCHLORIDE 0.4 MG/ML
0.4 INJECTION, SOLUTION INTRAMUSCULAR; INTRAVENOUS; SUBCUTANEOUS AS NEEDED
Status: DISCONTINUED | OUTPATIENT
Start: 2020-12-22 | End: 2020-12-23 | Stop reason: HOSPADM

## 2020-12-22 RX ORDER — LIDOCAINE HYDROCHLORIDE ANHYDROUS AND DEXTROSE MONOHYDRATE .4; 5 G/100ML; G/100ML
1 INJECTION, SOLUTION INTRAVENOUS CONTINUOUS
Status: ACTIVE | OUTPATIENT
Start: 2020-12-22 | End: 2020-12-23

## 2020-12-22 RX ORDER — SUCCINYLCHOLINE CHLORIDE 20 MG/ML
INJECTION INTRAMUSCULAR; INTRAVENOUS AS NEEDED
Status: DISCONTINUED | OUTPATIENT
Start: 2020-12-22 | End: 2020-12-22 | Stop reason: HOSPADM

## 2020-12-22 RX ORDER — HEPARIN SODIUM 5000 [USP'U]/ML
5000 INJECTION, SOLUTION INTRAVENOUS; SUBCUTANEOUS EVERY 8 HOURS
Status: DISCONTINUED | OUTPATIENT
Start: 2020-12-22 | End: 2020-12-23 | Stop reason: HOSPADM

## 2020-12-22 RX ORDER — GABAPENTIN 100 MG/1
200 CAPSULE ORAL 2 TIMES DAILY
Status: DISCONTINUED | OUTPATIENT
Start: 2020-12-22 | End: 2020-12-23 | Stop reason: HOSPADM

## 2020-12-22 RX ORDER — FENTANYL CITRATE 50 UG/ML
INJECTION, SOLUTION INTRAMUSCULAR; INTRAVENOUS AS NEEDED
Status: DISCONTINUED | OUTPATIENT
Start: 2020-12-22 | End: 2020-12-22 | Stop reason: HOSPADM

## 2020-12-22 RX ORDER — KETOROLAC TROMETHAMINE 30 MG/ML
15 INJECTION, SOLUTION INTRAMUSCULAR; INTRAVENOUS EVERY 6 HOURS
Status: COMPLETED | OUTPATIENT
Start: 2020-12-22 | End: 2020-12-23

## 2020-12-22 RX ORDER — ONDANSETRON 2 MG/ML
4 INJECTION INTRAMUSCULAR; INTRAVENOUS EVERY 4 HOURS
Status: DISCONTINUED | OUTPATIENT
Start: 2020-12-22 | End: 2020-12-23 | Stop reason: HOSPADM

## 2020-12-22 RX ORDER — HYDROMORPHONE HYDROCHLORIDE 1 MG/ML
0.5 INJECTION, SOLUTION INTRAMUSCULAR; INTRAVENOUS; SUBCUTANEOUS
Status: DISCONTINUED | OUTPATIENT
Start: 2020-12-22 | End: 2020-12-23 | Stop reason: HOSPADM

## 2020-12-22 RX ORDER — MIDAZOLAM HYDROCHLORIDE 1 MG/ML
2 INJECTION, SOLUTION INTRAMUSCULAR; INTRAVENOUS
Status: COMPLETED | OUTPATIENT
Start: 2020-12-22 | End: 2020-12-22

## 2020-12-22 RX ORDER — ACETAMINOPHEN 500 MG
1000 TABLET ORAL ONCE
Status: COMPLETED | OUTPATIENT
Start: 2020-12-22 | End: 2020-12-22

## 2020-12-22 RX ORDER — BUPIVACAINE HYDROCHLORIDE 5 MG/ML
INJECTION, SOLUTION EPIDURAL; INTRACAUDAL AS NEEDED
Status: DISCONTINUED | OUTPATIENT
Start: 2020-12-22 | End: 2020-12-22 | Stop reason: HOSPADM

## 2020-12-22 RX ORDER — SCOLOPAMINE TRANSDERMAL SYSTEM 1 MG/1
1 PATCH, EXTENDED RELEASE TRANSDERMAL ONCE
Status: COMPLETED | OUTPATIENT
Start: 2020-12-22 | End: 2020-12-22

## 2020-12-22 RX ORDER — NALOXONE HYDROCHLORIDE 0.4 MG/ML
0.1 INJECTION, SOLUTION INTRAMUSCULAR; INTRAVENOUS; SUBCUTANEOUS AS NEEDED
Status: DISCONTINUED | OUTPATIENT
Start: 2020-12-22 | End: 2020-12-22 | Stop reason: HOSPADM

## 2020-12-22 RX ORDER — MIDAZOLAM HYDROCHLORIDE 1 MG/ML
2 INJECTION, SOLUTION INTRAMUSCULAR; INTRAVENOUS ONCE
Status: DISCONTINUED | OUTPATIENT
Start: 2020-12-22 | End: 2020-12-22 | Stop reason: HOSPADM

## 2020-12-22 RX ORDER — HEPARIN SODIUM 5000 [USP'U]/ML
5000 INJECTION, SOLUTION INTRAVENOUS; SUBCUTANEOUS ONCE
Status: COMPLETED | OUTPATIENT
Start: 2020-12-22 | End: 2020-12-22

## 2020-12-22 RX ADMIN — ROCURONIUM BROMIDE 20 MG: 10 INJECTION, SOLUTION INTRAVENOUS at 09:01

## 2020-12-22 RX ADMIN — SODIUM CHLORIDE, SODIUM LACTATE, POTASSIUM CHLORIDE, AND CALCIUM CHLORIDE 125 ML/HR: 600; 310; 30; 20 INJECTION, SOLUTION INTRAVENOUS at 10:22

## 2020-12-22 RX ADMIN — LIDOCAINE HYDROCHLORIDE 1 MG/KG/HR: 4 INJECTION, SOLUTION INTRAVENOUS at 08:05

## 2020-12-22 RX ADMIN — PHENYLEPHRINE HYDROCHLORIDE 100 MCG: 10 INJECTION INTRAVENOUS at 08:39

## 2020-12-22 RX ADMIN — PROPOFOL 250 MG: 10 INJECTION, EMULSION INTRAVENOUS at 07:52

## 2020-12-22 RX ADMIN — SUCCINYLCHOLINE CHLORIDE 160 MG: 20 INJECTION, SOLUTION INTRAMUSCULAR; INTRAVENOUS at 07:52

## 2020-12-22 RX ADMIN — HYDROMORPHONE HYDROCHLORIDE 0.5 MG: 2 INJECTION INTRAMUSCULAR; INTRAVENOUS; SUBCUTANEOUS at 10:18

## 2020-12-22 RX ADMIN — ONDANSETRON 4 MG: 2 INJECTION INTRAMUSCULAR; INTRAVENOUS at 20:18

## 2020-12-22 RX ADMIN — ACETAMINOPHEN 1000 MG: 500 TABLET ORAL at 06:34

## 2020-12-22 RX ADMIN — ONDANSETRON 4 MG: 2 INJECTION INTRAMUSCULAR; INTRAVENOUS at 12:12

## 2020-12-22 RX ADMIN — ROCURONIUM BROMIDE 40 MG: 10 INJECTION, SOLUTION INTRAVENOUS at 08:02

## 2020-12-22 RX ADMIN — CEFAZOLIN 3 G: 1 INJECTION, POWDER, FOR SOLUTION INTRAVENOUS at 07:41

## 2020-12-22 RX ADMIN — FAMOTIDINE 20 MG: 10 INJECTION, SOLUTION INTRAVENOUS at 20:18

## 2020-12-22 RX ADMIN — HYDROMORPHONE HYDROCHLORIDE 0.5 MG: 2 INJECTION INTRAMUSCULAR; INTRAVENOUS; SUBCUTANEOUS at 10:52

## 2020-12-22 RX ADMIN — SODIUM CHLORIDE: 900 INJECTION, SOLUTION INTRAVENOUS at 07:48

## 2020-12-22 RX ADMIN — FENTANYL CITRATE 50 MCG: 50 INJECTION INTRAMUSCULAR; INTRAVENOUS at 09:17

## 2020-12-22 RX ADMIN — FENTANYL CITRATE 100 MCG: 50 INJECTION INTRAMUSCULAR; INTRAVENOUS at 08:02

## 2020-12-22 RX ADMIN — SUGAMMADEX 200 MG: 100 INJECTION, SOLUTION INTRAVENOUS at 10:00

## 2020-12-22 RX ADMIN — GABAPENTIN 200 MG: 100 CAPSULE ORAL at 17:00

## 2020-12-22 RX ADMIN — LIDOCAINE HYDROCHLORIDE 60 MG: 20 INJECTION, SOLUTION EPIDURAL; INFILTRATION; INTRACAUDAL; PERINEURAL at 07:52

## 2020-12-22 RX ADMIN — ACETAMINOPHEN 1000 MG: 500 TABLET, FILM COATED ORAL at 12:12

## 2020-12-22 RX ADMIN — SODIUM CHLORIDE, SODIUM LACTATE, POTASSIUM CHLORIDE, AND CALCIUM CHLORIDE 100 ML/HR: 600; 310; 30; 20 INJECTION, SOLUTION INTRAVENOUS at 06:30

## 2020-12-22 RX ADMIN — SODIUM CHLORIDE: 900 INJECTION, SOLUTION INTRAVENOUS at 09:26

## 2020-12-22 RX ADMIN — KETOROLAC TROMETHAMINE 15 MG: 30 INJECTION, SOLUTION INTRAMUSCULAR at 17:00

## 2020-12-22 RX ADMIN — ONDANSETRON 4 MG: 2 INJECTION INTRAMUSCULAR; INTRAVENOUS at 08:22

## 2020-12-22 RX ADMIN — SODIUM CHLORIDE, SODIUM LACTATE, POTASSIUM CHLORIDE, AND CALCIUM CHLORIDE: 600; 310; 30; 20 INJECTION, SOLUTION INTRAVENOUS at 08:35

## 2020-12-22 RX ADMIN — HEPARIN SODIUM 5000 UNITS: 5000 INJECTION INTRAVENOUS; SUBCUTANEOUS at 20:24

## 2020-12-22 RX ADMIN — Medication 5 MG: at 09:42

## 2020-12-22 RX ADMIN — PHENYLEPHRINE HYDROCHLORIDE 100 MCG: 10 INJECTION INTRAVENOUS at 08:34

## 2020-12-22 RX ADMIN — FAMOTIDINE 20 MG: 10 INJECTION, SOLUTION INTRAVENOUS at 12:12

## 2020-12-22 RX ADMIN — SUCRALFATE 1 G: 1 TABLET ORAL at 21:12

## 2020-12-22 RX ADMIN — APREPITANT 40 MG: 40 CAPSULE ORAL at 06:34

## 2020-12-22 RX ADMIN — OXYCODONE HYDROCHLORIDE 5 MG: 5 TABLET ORAL at 12:12

## 2020-12-22 RX ADMIN — OXYCODONE HYDROCHLORIDE 5 MG: 5 TABLET ORAL at 20:23

## 2020-12-22 RX ADMIN — FENTANYL CITRATE 50 MCG: 50 INJECTION INTRAMUSCULAR; INTRAVENOUS at 08:22

## 2020-12-22 RX ADMIN — ONDANSETRON 4 MG: 2 INJECTION INTRAMUSCULAR; INTRAVENOUS at 16:58

## 2020-12-22 RX ADMIN — METOCLOPRAMIDE 5 MG: 10 TABLET ORAL at 06:34

## 2020-12-22 RX ADMIN — ROCURONIUM BROMIDE 10 MG: 10 INJECTION, SOLUTION INTRAVENOUS at 07:52

## 2020-12-22 RX ADMIN — ROCURONIUM BROMIDE 10 MG: 10 INJECTION, SOLUTION INTRAVENOUS at 08:28

## 2020-12-22 RX ADMIN — SUCRALFATE 1 G: 1 TABLET ORAL at 17:00

## 2020-12-22 RX ADMIN — KETOROLAC TROMETHAMINE 15 MG: 30 INJECTION, SOLUTION INTRAMUSCULAR at 12:12

## 2020-12-22 RX ADMIN — ACETAMINOPHEN 1000 MG: 500 TABLET, FILM COATED ORAL at 17:00

## 2020-12-22 RX ADMIN — HYDROMORPHONE HYDROCHLORIDE 0.5 MG: 2 INJECTION INTRAMUSCULAR; INTRAVENOUS; SUBCUTANEOUS at 10:37

## 2020-12-22 RX ADMIN — SODIUM CHLORIDE, SODIUM LACTATE, POTASSIUM CHLORIDE, AND CALCIUM CHLORIDE: 600; 310; 30; 20 INJECTION, SOLUTION INTRAVENOUS at 09:16

## 2020-12-22 RX ADMIN — Medication 10 MG: at 07:58

## 2020-12-22 RX ADMIN — HEPARIN SODIUM 5000 UNITS: 5000 INJECTION INTRAVENOUS; SUBCUTANEOUS at 06:34

## 2020-12-22 RX ADMIN — GLYCOPYRROLATE 0.5 MG: 0.2 INJECTION, SOLUTION INTRAMUSCULAR; INTRAVENOUS at 09:45

## 2020-12-22 RX ADMIN — Medication 10 MG: at 08:26

## 2020-12-22 RX ADMIN — MIDAZOLAM 2 MG: 1 INJECTION INTRAMUSCULAR; INTRAVENOUS at 07:00

## 2020-12-22 RX ADMIN — KETAMINE HYDROCHLORIDE 70 MG: 50 INJECTION INTRAMUSCULAR; INTRAVENOUS at 07:52

## 2020-12-22 RX ADMIN — SODIUM CHLORIDE, SODIUM LACTATE, POTASSIUM CHLORIDE, AND CALCIUM CHLORIDE 125 ML/HR: 600; 310; 30; 20 INJECTION, SOLUTION INTRAVENOUS at 12:00

## 2020-12-22 RX ADMIN — SODIUM CHLORIDE, SODIUM LACTATE, POTASSIUM CHLORIDE, AND CALCIUM CHLORIDE: 600; 310; 30; 20 INJECTION, SOLUTION INTRAVENOUS at 07:47

## 2020-12-22 RX ADMIN — NALOXEGOL OXALATE 25 MG: 25 TABLET, FILM COATED ORAL at 06:34

## 2020-12-22 RX ADMIN — OXYCODONE HYDROCHLORIDE 5 MG: 5 TABLET ORAL at 16:58

## 2020-12-22 NOTE — PROGRESS NOTES
Admission assessment complete. Pt resting in bed. A&Ox4. 5 lap sites to abdomen with glue c/d/i and abd binder in place. Lidocaine infusing at 15.3 ml\hr. Bed in lowest position, call light within reach, side rails x3. Encouraged to call for help when needed.

## 2020-12-22 NOTE — PROGRESS NOTES
rounded on patient with nurse. Interpreting services offered when needed. [facility] staff  available over the phone from 3:30 p.m. - midnight. Please call Ronni at [phone] with any interpreting requests.       [address] [facility]  Language Services Department  [place] [address]  [phone] (phone)

## 2020-12-22 NOTE — PROGRESS NOTES
Problem: Patient Education: Go to Patient Education Activity  Goal: Patient/Family Education  Note: STG:  (1.)Mr. Sindi Loco will move from supine to sit and sit to supine  with SUPERVISION within 3 treatment day(s). (2.)Mr. Sindi Loco will transfer from bed to chair and chair to bed with SUPERVISION using the least restrictive device within 3 treatment day(s). (3.)Mr. Sindi Loco will ambulate with SUPERVISION for 650 feet with the least restrictive device within 3 treatment day(s). (4.)Pt. will climb up/down 14 steps with rail and CGA within 3 days  (5.)Pt. perform PT HEP independently within 3 days       PHYSICAL THERAPY: Initial Assessment and PM 12/22/2020  INPATIENT: PT Visit Days : 1  Payor: Juvencio Myers / Plan: [address] / Product Type: PPO /       NAME/AGE/GENDER: Meng Howard is a 45 y.o. male   PRIMARY DIAGNOSIS: Morbid obesity ([facility] [E66.01] <principal problem not specified> <principal problem not specified>  Procedure(s) (LRB):  ERAS/ GASTRECTOMY SLEEVE LAPAROSCOPIC Albanian   (N/A)  ESOPHAGOGASTRODUODENOSCOPY (EGD) (N/A)  Day of Surgery  ICD-10: Treatment Diagnosis:    · Generalized Muscle Weakness (M62.81)  · Other abnormalities of gait and mobility (R26.89)   Precaution/Allergies:  Patient has no known allergies. ASSESSMENT:     Mr. Sindi Loco presents S/P gastric sleeve surgery for morbid obesity. He is presenting with a decline in his premorbid level of function and would benefit from further PT while here to address these deficits: decreased general strength and endurance, decreased standing balance, functional mobility and awareness of PT HEP and ambulation program. [facility]. This pm, he completes his PT HEP and we review the importance of him performing exs and /or ambulating each hour. As he attempts to stand and ambulate, he becomes dizzy and has to lay back down.     This section established at most recent assessment   PROBLEM LIST (Impairments causing functional limitations):  1. Decreased Strength  2. Decreased ADL/Functional Activities  3. Decreased Transfer Abilities  4. Decreased Ambulation Ability/Technique  5. Decreased Balance  6. Increased Pain  7. Decreased Activity Tolerance  8. Increased Fatigue  9. Decreased Flexibility/Joint Mobility  10. Decreased Knowledge of Precautions  11. Decreased [place] with Home Exercise Program   INTERVENTIONS PLANNED: (Benefits and precautions of physical therapy have been discussed with the patient.)  1. Bed Mobility  2. Gait Training  3. Home Exercise Program (HEP)  4. Range of Motion (ROM)  5. Therapeutic Activites  6. Therapeutic Exercise/Strengthening  7. Transfer Training     TREATMENT PLAN: Frequency/Duration: daily for duration of hospital stay  Rehabilitation Potential For Stated Goals: Good     REHAB RECOMMENDATIONS (at time of discharge pending progress):    Placement: It is my opinion, based on this patient's performance to date, that Mr. Hany Mendenhall may benefit from being discharged with NO further skilled therapy due to the high likelihood of returning to baseline. He will continue at home with his PT HEP and walking program  Equipment:    None at this time              HISTORY:   History of Present Injury/Illness (Reason for Referral): Admitted S/P gastric sleeve surgery  Past Medical History/Comorbidities:   Mr. Hany Mendenhall  has a past medical history of Anxiety, Hypercholesterolemia, Prediabetes, and Sleep apnea.   Mr. Hany Mendenhall  has a past surgical history that includes hx other surgical.  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     Number of Personal Factors/Comorbidities that affect the Plan of Care: 1-2: MODERATE COMPLEXITY   EXAMINATION:   Most Recent Physical Functioning:   Gross Assessment:  AROM: Generally decreased, functional(all 4s)  Strength: Generally decreased, functional(all 4s 3+)  Sensation: Intact(all 4s)               Posture:     Balance:  Sitting: Intact  Standing: Pull to stand; With support Bed Mobility:  Supine to Sit: Minimum assistance  Sit to Supine: Minimum assistance  Wheelchair Mobility:     Transfers:  Sit to Stand: Minimum assistance  Stand to Sit: Minimum assistance  Stand Pivot Transfers: Minimal assistance  Gait:     Speed/Diamond: Shuffled; Slow  Gait Abnormalities: Antalgic; Shuffling gait  Distance (ft): 3 Feet (ft)  Assistive Device: (HHA)  Ambulation - Level of Assistance: Minimal assistance      Body Structures Involved:  1. Digestive Structures  2. Joints  3. Muscles Body Functions Affected:  1. Movement Related  2. Digestive Activities and Participation Affected:  1. General Tasks and Demands  2. Mobility  3. Self Care   Number of elements that affect the Plan of Care: 4+: HIGH COMPLEXITY   CLINICAL PRESENTATION:   Presentation: Stable and uncomplicated: LOW COMPLEXITY   CLINICAL DECISION MAKIN [place] [address] AM-PAC 6 Clicks   Basic Mobility Inpatient Short Form  How much difficulty does the patient currently have. .. Unable A Lot A Little None   1. Turning over in bed (including adjusting bedclothes, sheets and blankets)? [] 1   [] 2   [x] 3   [] 4   2. Sitting down on and standing up from a chair with arms ( e.g., wheelchair, bedside commode, etc.)   [] 1   [] 2   [x] 3   [] 4   3. Moving from lying on back to sitting on the side of the bed? [] 1   [] 2   [x] 3   [] 4   How much help from another person does the patient currently need. ..  Total A Lot A Little None   4. Moving to and from a bed to a chair (including a wheelchair)? [] 1   [] 2   [x] 3   [] 4   5. Need to walk in hospital room? [] 1   [x] 2   [] 3   [] 4   6. Climbing 3-5 steps with a railing? [x] 1   [] 2   [] 3   [] 4   © 2007, Trustees of [address] [place] [address] [address] [address], under license to [employer]. All rights reserved      Score:  Initial: 15 Most Recent: X (Date: -- )    Interpretation of Tool:  Represents activities that are increasingly more difficult (i.e. Bed mobility, Transfers, Gait). Medical Necessity:     · Patient demonstrates   · good  ·  rehab potential due to higher previous functional level. Reason for Services/Other Comments:  · Patient   · continues to require present interventions due to patient's inability to perform functional mobility at a supervision level and HEP independently  · . Use of outcome tool(s) and clinical judgement create a POC that gives a: Clear prediction of patient's progress: LOW COMPLEXITY            TREATMENT:   (In addition to Assessment/Re-Assessment sessions the following treatments were rendered)   Pre-treatment Symptoms/Complaints:  dizzy upon standing, abdominal pain with positional transitions  Pain: Initial:   Pain Intensity 1: 5  Pain Location 1: Abdomen  Post Session:  4, returned to bed, RN aware     Therapeutic Exercise: (10 Minutes):  Exercises per grid below to improve mobility, strength, and coordination. Required minimal visual, verbal, and tactile cues to promote proper body alignment and promote proper body breathing techniques. Progressed range, repetitions, and complexity of movement as indicated. Date:  12/22/20 Date:   Date:     Activity/Exercise Parameters Parameters Parameters   Ankle pumps 10     Heel slides 10     Shoulder flex 10     Elbow flex/ext 10                       We reviewed the PT HEP and discussed the importance of performing exs or walking each hour that he is awake.  Left him the sheets/log    Assessment provided today    Braces/Orthotics/Lines/Etc:   · IV  · O2 Device: Nasal cannula  Treatment/Session Assessment:    · Response to Treatment:  tolerated well when supine, but once sitting then standing became dizzy. Returned to bed. RN, Marlowe Sacks aware  · Interdisciplinary Collaboration:   o Physical Therapist  o Registered Nurse  · After treatment position/precautions:   o Supine in bed  o Bed/Chair-wheels locked  o Bed in low position  o Call light within reach  o RN notified  o Side rails x 3   · Compliance with Program/Exercises: Will assess as treatment progresses  · Recommendations/Intent for next treatment session: \"Next visit will focus on advancements to more challenging activities and reduction in assistance provided\".   Total Treatment Duration:  PT Patient Time In/Time Out  Time In: 1255  Time Out: [address]

## 2020-12-22 NOTE — PROGRESS NOTES
present over the phone for assessment with Laina Vides NP.         [address] [facility]  Language Services Department  [facility] [address] [facility]  [id number] [phone] (phone)

## 2020-12-22 NOTE — PROGRESS NOTES
Care Management Interventions  PCP Verified by CM: Yes  Discharge Durable Medical Equipment: No  Physical Therapy Consult: Yes  Occupational Therapy Consult: No  Current Support Network: Own Home  Discharge Location  Discharge Placement: Home    Chart screened by  for discharge planning. No needs identified at this time. Please consult  if any new issues arise.

## 2020-12-22 NOTE — PROGRESS NOTES
Patient looks good and is doing well. Up in bedside chair,  used during visit via telephone    BP (!) 128/97 (BP 1 Location: Right arm, BP Patient Position: At rest)   Pulse 88   Temp 98.2 °F (36.8 °C)   Resp 16   Ht 5' 5\" (1.651 m)   Wt 280 lb (127 kg)   SpO2 92%   BMI 46.59 kg/m²   Vital signs are stable. Tabby Franco is alert and oriented. Pain is controlled with little discomfort. No nausea at this time. Reminded to ask for pain and anti-nausea medications as needed. Incision sites are dry, intact, without presence of erythema, infection, or allergic reaction, abd binder in place. Lungs were clear to auscultate. S1, S2 heard without murmur present. LE's without edema, pedal pulses present. Patient has been up briefly with PT without complications. Education given on IS use. He has voided. Tolerating clear liquids.     Jorge L Padron NP

## 2020-12-22 NOTE — ANESTHESIA POSTPROCEDURE EVALUATION
Procedure(s):  ERAS/ GASTRECTOMY SLEEVE LAPAROSCOPIC Eritrean    ESOPHAGOGASTRODUODENOSCOPY (EGD).     general    Anesthesia Post Evaluation      Multimodal analgesia: multimodal analgesia used between 6 hours prior to anesthesia start to PACU discharge  Patient location during evaluation: PACU  Patient participation: complete - patient participated  Level of consciousness: awake and awake and alert  Pain management: adequate  Airway patency: patent  Anesthetic complications: no  Cardiovascular status: acceptable  Respiratory status: acceptable  Hydration status: acceptable  Post anesthesia nausea and vomiting:  controlled      INITIAL Post-op Vital signs:   Vitals Value Taken Time   /81 12/22/20 1105   Temp 37 °C (98.6 °F) 12/22/20 1010   Pulse 71 12/22/20 1105   Resp 16 12/22/20 1105   SpO2 95 % 12/22/20 1105

## 2020-12-22 NOTE — OP NOTES
Operative Report    Name: Riccardo Arellano      MRN: [id number]       : 1970       Age: 45 y.o. Sex: male    Date of Surgery: 2020     Preoperative Diagnosis: Morbid obesity ([facility] [E66.01]     Postoperative Diagnosis: Morbid obesity ([facility] [E66.01]     Name of procedure:    1. LAPAROSCOPIC SLEEVE GASTRECTOMY CPT 24427  2. ESOPHAGOGASTRODUODENOSCOPY    Surgeon: Shannon Larios MD     Assistant: Abad Titus's assistance was needed due to the complexity of this case. She assisted with camera driving, and laparoscopic retraction and suctioning. Anesthesia: General     Complications: None    Estimated Blood Loss: Minimal           Specimens:   ID Type Source Tests Collected by Time Destination   1 : Portion of stomach Fresh Stomach  Gamaliel García MD 2020 5042 Pathology       Implants:  * No implants in log *    Findings:  Normal appearing intra-abdominal anatomy. Negative leak test. Normal appearing gastric and esophageal mucousa without staple line bleeding. Statement of Medical Necessity: Riccardo Arellano is a 45 y.o. male who presented to the clinic with history of morbid obesity and Body mass index is 46.59 kg/m². Paula Ledezma He failed multiple weight loss attempts meets the NIH criteria for obesity surgery. He decided for a laparoscopic vs open sleeve gastrectomy. We discussed specific risks of sleeve gastrectomy including but not limited to: pain, infection, bleeding, scar, excess skin, conversion to open approach, hernia, injury to adjacent organs, need for blood transfusion, thromboembolic complications, gastrointestinal leak, stricture, difficulty swallowing, need for revisional surgery, gastroesophageal reflux, esophagitis or esophageal cancer, need for revisional surgery, failure to lose weight or weight regain, nutritional deficiencies, heart attack, stroke, death. Patient understood and agreed to proceed.      Procedure Details   After informed consent was taken, patient was taken to the operating room and placed in supine position with padding in all pressure points. Anesthesia was induced and patient was intubated. Patient received preoperative antibiotics. Abdomen was prepped and draped in standard sterile fashion. A timeout was performed with all team members present. A 1 cm incision was made in the left upper quadrant. A Veress needle was inserted. Saline drop test was normal. The abdomen was insufflated with carbon dioxide to a pressure of 15 mmHg. The patient tolerated the insufflation well. A 5 mm trocar was inserted using an Optiview technique. A general survey was performed and no injury was observed from trocar placement. Two additional 5 mm trocars were placed in the right and left upper quadrants as well as a 15 mm trocar in the right upper quadrant under direct visualization. A 5 mm tunnel was created distal and left lateral to the xiphoid, a Rick liver retractor advanced, and the liver retracted. A bilateral abdominal tap block was performed using Marcaine. A premeasured instrument was used to romulo the position 7 cm proximal to the pylorus along the greater curvature. The stomach was grasped and elevated. A Harmonic scalpel was used to divide the gastrocolic ligament and enter the lesser sac. The greater curvature of the stomach was divided free from its gastrocolic attachments in its entirety, to the level of the spleen. Next, a 43 Uzbek blunt bougie was advanced along the lesser curvature to the pylorus. Using 6 sequential firings of Seam Guard buttressed Ethicon ECHELON 60 mm, black (1), green (1), gold (3) and blue (1) endostaplers, the stomach was divided and the greater curvature segment placed in the right upper quadrant of the abdomen. The bougie was withdrawn. The lower part of the sleeve was fixated to the retroperitoneum using a 2-0 Vicryl suture to prevent twisting or kinking.  An esophagogastroduodenoscopy advanced into the stomach. Using distal occlusion, insufflation of the stomach and immersion in sterile saline, a leak test was performed. No leak along the staple line was present. There was also no intraluminal bleeding or abnormalities of the stomach appreciated. The air was aspirated and the esophagogastroduodenoscopy withdrawn. The intraperitoneal saline was aspirated. Hemostasis was assured. The liver retractor was removed under direct vision. The stomach was removed through the 15 mm port using an endocatch bag. This incision was closed at the level of the fascia with 0-vicryl sutures and an endoclosure device. The wounds were irrigated with betadine solution and closed with 4-0 Monocryl in a subcuticular fashion. Dermabond was applied to the skin incisions. All counts were reported as correct. The patient tolerated the procedure well and there were no immediate complications. Disposition: the patient was awakened from anesthesia and transferred to the PACU in stable condition.          Signed by: Aicha Elliott MD  [id number] [facility]  12/22/2020 9:54 AM

## 2020-12-22 NOTE — PROGRESS NOTES
TRANSFER - IN REPORT:    Verbal report received from [facility] on [facility]  being received from PACU for routine post - op      Report consisted of patients Situation, Background, Assessment and   Recommendations(SBAR). Information from the following report(s) SBAR, Kardex, OR Summary, Intake/Output, MAR and Recent Results was reviewed with the receiving nurse. Opportunity for questions and clarification was provided.

## 2020-12-22 NOTE — ANESTHESIA PREPROCEDURE EVALUATION
Relevant Problems   No relevant active problems       Anesthetic History   No history of anesthetic complications            Review of Systems / Medical History  Patient summary reviewed, nursing notes reviewed and pertinent labs reviewed    Pulmonary        Sleep apnea: CPAP           Neuro/Psych   Within defined limits           Cardiovascular                  Exercise tolerance: >4 METS     GI/Hepatic/Renal  Within defined limits              Endo/Other        Morbid obesity     Other Findings              Physical Exam    Airway  Mallampati: III  TM Distance: 4 - 6 cm  Neck ROM: normal range of motion   Mouth opening: Normal     Cardiovascular  Regular rate and rhythm,  S1 and S2 normal,  no murmur, click, rub, or gallop             Dental  No notable dental hx       Pulmonary  Breath sounds clear to auscultation               Abdominal         Other Findings            Anesthetic Plan    ASA: 3  Anesthesia type: general          Induction: Intravenous  Anesthetic plan and risks discussed with: Patient and Spouse

## 2020-12-22 NOTE — INTERVAL H&P NOTE
Update History & Physical 
 
The Patient's History and Physical of December 8, Lap sleeve gastrectomy, EGD was reviewed with the patient and I examined the patient. There was no change. The surgical site was confirmed by the patient and me. Plan:  The risk, benefits, expected outcome, and alternative to the recommended procedure have been discussed with the patient. Patient understands and wants to proceed with the procedure.  
 
Electronically signed by Brendan Cevallos MD on 12/22/2020 at 7:38 AM

## 2020-12-23 VITALS
DIASTOLIC BLOOD PRESSURE: 86 MMHG | OXYGEN SATURATION: 93 % | TEMPERATURE: 98.7 F | WEIGHT: 280 LBS | SYSTOLIC BLOOD PRESSURE: 128 MMHG | HEIGHT: 65 IN | RESPIRATION RATE: 16 BRPM | HEART RATE: 70 BPM | BODY MASS INDEX: 46.65 KG/M2

## 2020-12-23 LAB
ANION GAP SERPL CALC-SCNC: 3 MMOL/L (ref 7–16)
BASOPHILS # BLD: 0 K/UL (ref 0–0.2)
BASOPHILS NFR BLD: 1 % (ref 0–2)
BUN SERPL-MCNC: 9 MG/DL (ref 6–23)
CALCIUM SERPL-MCNC: 7.7 MG/DL (ref 8.3–10.4)
CHLORIDE SERPL-SCNC: 107 MMOL/L (ref 98–107)
CO2 SERPL-SCNC: 30 MMOL/L (ref 21–32)
CREAT SERPL-MCNC: 0.81 MG/DL (ref 0.8–1.5)
DIFFERENTIAL METHOD BLD: ABNORMAL
EOSINOPHIL # BLD: 0.1 K/UL (ref 0–0.8)
EOSINOPHIL NFR BLD: 1 % (ref 0.5–7.8)
ERYTHROCYTE [DISTWIDTH] IN BLOOD BY AUTOMATED COUNT: 14.2 % (ref 11.9–14.6)
GLUCOSE SERPL-MCNC: 84 MG/DL (ref 65–100)
HCT VFR BLD AUTO: 40.1 % (ref 41.1–50.3)
HGB BLD-MCNC: 12.8 G/DL (ref 13.6–17.2)
IMM GRANULOCYTES # BLD AUTO: 0 K/UL (ref 0–0.5)
IMM GRANULOCYTES NFR BLD AUTO: 0 % (ref 0–5)
LYMPHOCYTES # BLD: 1.5 K/UL (ref 0.5–4.6)
LYMPHOCYTES NFR BLD: 23 % (ref 13–44)
MCH RBC QN AUTO: 25.9 PG (ref 26.1–32.9)
MCHC RBC AUTO-ENTMCNC: 31.9 G/DL (ref 31.4–35)
MCV RBC AUTO: 81.2 FL (ref 79.6–97.8)
MONOCYTES # BLD: 0.4 K/UL (ref 0.1–1.3)
MONOCYTES NFR BLD: 6 % (ref 4–12)
NEUTS SEG # BLD: 4.5 K/UL (ref 1.7–8.2)
NEUTS SEG NFR BLD: 70 % (ref 43–78)
NRBC # BLD: 0 K/UL (ref 0–0.2)
PLATELET # BLD AUTO: 145 K/UL (ref 150–450)
PMV BLD AUTO: 11.8 FL (ref 9.4–12.3)
POTASSIUM SERPL-SCNC: 4.1 MMOL/L (ref 3.5–5.1)
RBC # BLD AUTO: 4.94 M/UL (ref 4.23–5.6)
SODIUM SERPL-SCNC: 140 MMOL/L (ref 136–145)
WBC # BLD AUTO: 6.5 K/UL (ref 4.3–11.1)

## 2020-12-23 PROCEDURE — 94760 N-INVAS EAR/PLS OXIMETRY 1: CPT

## 2020-12-23 PROCEDURE — 80048 BASIC METABOLIC PNL TOTAL CA: CPT

## 2020-12-23 PROCEDURE — 36415 COLL VENOUS BLD VENIPUNCTURE: CPT

## 2020-12-23 PROCEDURE — 97110 THERAPEUTIC EXERCISES: CPT

## 2020-12-23 PROCEDURE — 74011250637 HC RX REV CODE- 250/637: Performed by: NURSE PRACTITIONER

## 2020-12-23 PROCEDURE — 74011250636 HC RX REV CODE- 250/636: Performed by: SURGERY

## 2020-12-23 PROCEDURE — 97116 GAIT TRAINING THERAPY: CPT

## 2020-12-23 PROCEDURE — 74011250636 HC RX REV CODE- 250/636: Performed by: NURSE PRACTITIONER

## 2020-12-23 PROCEDURE — 74011000250 HC RX REV CODE- 250: Performed by: SURGERY

## 2020-12-23 PROCEDURE — 85025 COMPLETE CBC W/AUTO DIFF WBC: CPT

## 2020-12-23 RX ADMIN — ONDANSETRON 4 MG: 2 INJECTION INTRAMUSCULAR; INTRAVENOUS at 09:34

## 2020-12-23 RX ADMIN — FAMOTIDINE 20 MG: 10 INJECTION, SOLUTION INTRAVENOUS at 09:34

## 2020-12-23 RX ADMIN — OXYCODONE HYDROCHLORIDE 5 MG: 5 TABLET ORAL at 05:15

## 2020-12-23 RX ADMIN — ONDANSETRON 4 MG: 2 INJECTION INTRAMUSCULAR; INTRAVENOUS at 00:12

## 2020-12-23 RX ADMIN — KETOROLAC TROMETHAMINE 15 MG: 30 INJECTION, SOLUTION INTRAMUSCULAR at 00:12

## 2020-12-23 RX ADMIN — SODIUM CHLORIDE, SODIUM LACTATE, POTASSIUM CHLORIDE, AND CALCIUM CHLORIDE 125 ML/HR: 600; 310; 30; 20 INJECTION, SOLUTION INTRAVENOUS at 02:55

## 2020-12-23 RX ADMIN — ACETAMINOPHEN 1000 MG: 500 TABLET, FILM COATED ORAL at 00:12

## 2020-12-23 RX ADMIN — SUCRALFATE 1 G: 1 TABLET ORAL at 09:33

## 2020-12-23 RX ADMIN — ONDANSETRON 4 MG: 2 INJECTION INTRAMUSCULAR; INTRAVENOUS at 05:15

## 2020-12-23 RX ADMIN — HEPARIN SODIUM 5000 UNITS: 5000 INJECTION INTRAVENOUS; SUBCUTANEOUS at 05:15

## 2020-12-23 RX ADMIN — KETOROLAC TROMETHAMINE 15 MG: 30 INJECTION, SOLUTION INTRAMUSCULAR at 05:15

## 2020-12-23 RX ADMIN — GABAPENTIN 200 MG: 100 CAPSULE ORAL at 09:34

## 2020-12-23 NOTE — PROGRESS NOTES
Problem: Patient Education: Go to Patient Education Activity  Goal: Patient/Family Education  Note: STG:  (1.)Mr. Burke Haywood will move from supine to sit and sit to supine  with SUPERVISION within 3 treatment day(s). (2.)Mr. Burke Haywood will transfer from bed to chair and chair to bed with SUPERVISION using the least restrictive device within 3 treatment day(s). MET 12/23/20   (3.)Mr. Burke Haywood will ambulate with SUPERVISION for 650 feet with the least restrictive device within 3 treatment day(s). MET 12/23/20  (4.)Pt. will climb up/down 14 steps with rail and CGA within 3 days  (5.)Pt. perform PT HEP independently within 3 days MET 12/23/20       PHYSICAL THERAPY: Daily Note and AM 12/23/2020  INPATIENT: PT Visit Days : 2  Payor: Dom Whitehead / Plan: [address] / Product Type: PPO /       NAME/AGE/GENDER: Edouard Cesar is a 45 y.o. male   PRIMARY DIAGNOSIS: Morbid obesity ([facility] [E66.01] <principal problem not specified> <principal problem not specified>  Procedure(s) (LRB):  ERAS/ GASTRECTOMY SLEEVE LAPAROSCOPIC [place]   (N/A)  ESOPHAGOGASTRODUODENOSCOPY (EGD) (N/A)  1 Day Post-Op  ICD-10: Treatment Diagnosis:    · Generalized Muscle Weakness (M62.81)  · Other abnormalities of gait and mobility (R26.89)   Precaution/Allergies:  Patient has no known allergies. ASSESSMENT:     Mr. Burke Haywood presents S/P gastric sleeve surgery for morbid obesity. He is presenting with a decline in his premorbid level of function and would benefit from further PT while here to address these deficits: decreased general strength and endurance, decreased standing balance, functional mobility and awareness of PT HEP and ambulation program. He plans on going home at [place] with the support of his spouse. This am, he is sitting up in a chair. His wife is present. We review his PT HEP and walking program. He completes his exs. Wife is present and sees these but does not speak much [place].  She is willing to offer him assistance. He then ambulates 650' with supervision. Has on slip on sandals that his feet don't fit well in. Instructed to wear more secure footwear from now on. Hoping to go home today. Will do fine. He is motivated to complete program.    This section established at most recent assessment   PROBLEM LIST (Impairments causing functional limitations):  1. Decreased Strength  2. Decreased ADL/Functional Activities  3. Decreased Transfer Abilities  4. Decreased Ambulation Ability/Technique  5. Decreased Balance  6. Increased Pain  7. Decreased Activity Tolerance  8. Increased Fatigue  9. Decreased Flexibility/Joint Mobility  10. Decreased Knowledge of Precautions  11. Decreased Is[place] with Home Exercise Program   INTERVENTIONS PLANNED: (Benefits and precautions of physical therapy have been discussed with the patient.)  1. Bed Mobility  2. Gait Training  3. Home Exercise Program (HEP)  4. Range of Motion (ROM)  5. Therapeutic Activites  6. Therapeutic Exercise/Strengthening  7. Transfer Training     TREATMENT PLAN: Frequency/Duration: daily for duration of hospital stay  Rehabilitation Potential For Stated Goals: Good     REHAB RECOMMENDATIONS (at time of discharge pending progress):    Placement: It is my opinion, based on this patient's performance to date, that Mr. Frida Luna may benefit from being discharged with NO further skilled therapy due to the high likelihood of returning to baseline. He will continue at home with his PT HEP and walking program  Equipment:    None at this time              HISTORY:   History of Present Injury/Illness (Reason for Referral): Admitted S/P gastric sleeve surgery  Past Medical History/Comorbidities:   Mr. Frida Luna  has a past medical history of Anxiety, Hypercholesterolemia, Prediabetes, and Sleep apnea.   Mr. Frida Luna  has a past surgical history that includes hx other surgical.  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     Number of Personal Factors/Comorbidities that affect the Plan of Care: 1-2: MODERATE COMPLEXITY   EXAMINATION:   Most Recent Physical Functioning:   Gross Assessment:  AROM: Generally decreased, functional(all 4s)  Strength: Generally decreased, functional(all 4s 3+)  Sensation: Intact(all 4s)               Posture:     Balance:  Sitting: Intact  Standing: (high guard) Bed Mobility:     Wheelchair Mobility:     Transfers:  Sit to Stand: Supervision  Stand to Sit: Supervision  Stand Pivot Transfers: Supervision  Bed to Chair: Supervision  Gait:     Speed/Diamond: Pace decreased (<100 feet/min); Shuffled  Gait Abnormalities: Antalgic;Decreased step clearance  Distance (ft): 650 Feet (ft)  Ambulation - Level of Assistance: Supervision  Interventions: Safety awareness training;Verbal cues  Duration: 15 Minutes      Body Structures Involved:  1. Digestive Structures  2. Joints  3. Muscles Body Functions Affected:  1. Movement Related  2. Digestive Activities and Participation Affected:  1. General Tasks and Demands  2. Mobility  3. Self Care   Number of elements that affect the Plan of Care: 4+: HIGH COMPLEXITY   CLINICAL PRESENTATION:   Presentation: Stable and uncomplicated: LOW COMPLEXITY   CLINICAL DECISION MAKING:   [employer] AM-PAC 6 Clicks   Basic Mobility Inpatient Short Form  How much difficulty does the patient currently have. .. Unable A Lot A Little None   1. Turning over in bed (including adjusting bedclothes, sheets and blankets)? [] 1   [] 2   [] 3   [x] 4   2.   Sitting down on and standing up from a chair with arms ( e.g., wheelchair, bedside commode, etc.)   [] 1   [] 2   [] 3   [x] 4   3. Moving from lying on back to sitting on the side of the bed? [] 1   [] 2   [x] 3   [] 4   How much help from another person does the patient currently need. .. Total A Lot A Little None   4. Moving to and from a bed to a chair (including a wheelchair)? [] 1   [] 2   [] 3   [x] 4   5. Need to walk in hospital room? [] 1   [x] 2   [x] 3   [] 4   6. Climbing 3-5 steps with a railing? [] 1   [] 2   [x] 3   [] 4   © 2007, Trustees of [address] [place] [address] [address] [address], under license to [email or web address]. All rights reserved      Score:  Initial: 15 Most Recent: 21 (Date: 12/23/20)    Interpretation of Tool:  Represents activities that are increasingly more difficult (i.e. Bed mobility, Transfers, Gait). Medical Necessity:     · Patient demonstrates   · good  ·  rehab potential due to higher previous functional level. Reason for Services/Other Comments:  · Patient   · continues to require present interventions due to patient's inability to perform functional mobility at a supervision level and HEP independently  · . Use of outcome tool(s) and clinical judgement create a POC that gives a: Clear prediction of patient's progress: LOW COMPLEXITY            TREATMENT:   (In addition to Assessment/Re-Assessment sessions the following treatments were rendered)   Pre-treatment Symptoms/Complaints:  No c/o  Pain: Initial:   Pain Intensity 1: 1  Pain Location 1: Abdomen  Pain Intervention(s) 1: Ambulation/Increased Activity, Exercise, Repositioned  Post Session:  0, sitting up in chair at table   Gait Training (15 Minutes):  Gait training to improve and/or restore physical functioning as related to mobility, strength and balance.   Ambulated 650 Feet (ft) with Supervision using a   and minimal Safety awareness training;Verbal cues related to their stride length and pace and breathing to promote proper body posture and promote proper body breathing techniques. Therapeutic Exercise: (15 Minutes):  Exercises per grid below to improve mobility, strength, and coordination. Required minimal visual, verbal, and tactile cues to promote proper body alignment and promote proper body breathing techniques. Progressed range, repetitions, and complexity of movement as indicated. Date:  12/22/20 Date:  12/23/20 Date:     Activity/Exercise Parameters Parameters Parameters   Ankle pumps 10 10    Heel slides 10 10    Shoulder flex 10 10    Elbow flex/ext 10 10    LAQ  10                We continue to review the PT HEP and discussed the importance of performing exs or walking each hour that he is awake. Braces/Orthotics/Lines/Etc:   · none  Treatment/Session Assessment:    · Response to Treatment:   Great progress today  · Interdisciplinary Collaboration:   o Physical Therapist  o Registered Nurse  · After treatment position/precautions:   o Up in chair  o Bed/Chair-wheels locked  o Call light within reach  o RN notified  o Family at bedside   · Compliance with Program/Exercises: Compliant all of the time  · Recommendations/Intent for next treatment session: \"Next visit will focus on advancements to more challenging activities and reduction in assistance provided\".  Probable dc today  Total Treatment Duration:  PT Patient Time In/Time Out  Time In: 0900  Time Out: [address], PT

## 2020-12-23 NOTE — PROGRESS NOTES
Problem: Falls - Risk of  Goal: *Absence of Falls  Description: Document Bing Carroll Fall Risk and appropriate interventions in the flowsheet.   Outcome: Progressing Towards Goal  Note: Fall Risk Interventions:  Mobility Interventions: Bed/chair exit alarm, Utilize walker, cane, or other assistive device, Patient to call before getting OOB    Mentation Interventions: Adequate sleep, hydration, pain control, Family/sitter at bedside, Increase mobility, More frequent rounding    Medication Interventions: Evaluate medications/consider consulting pharmacy, Bed/chair exit alarm, Patient to call before getting OOB, Teach patient to arise slowly    Elimination Interventions: Call light in reach, Elevated toilet seat, Patient to call for help with toileting needs, Stay With Me (per policy), Toilet paper/wipes in reach, Toileting schedule/hourly rounds

## 2020-12-23 NOTE — PROGRESS NOTES
Pt discharge summary and home medication sheet reviewed and signed by pt. Copy given for take home use. Assessment unchanged. All goals met. Pt tolerated protein max well this am.  Pt left hospital via w/c with staff member and wife.

## 2020-12-23 NOTE — DISCHARGE INSTRUCTIONS
General Instructions  No bathtub or pool for 2 weeks. Ok to shower. No weight lifting greater than 20 lbs for 6 weeks. Leave the skin glue in place. It will fall off on its own in 7-10 days. Diet  Take 30 cc (1 oz) of water or protein every 30 minutes. You should be drinking about 64 ounces of fluids a day to prevent dehydration. You should be consuming about 60-80 grams of protein a day to allow for good wound healing and healthy weight loss. Medications  Take tylenol as needed for mild pain every 4-6 hours. Percocet prescribed for mod to severe pain. This is a narcotic and can cause drowsiness, nausea and vomiting and constipation. Take Colace 100mg BID (over the counter) if constipated. Take Protonix 40 mg daily for 3 months. Take Carafate 10 mL by mouth three (3) times daily for 30 days. Take Zofran 8 mg tablet as needed for nausea every 8 hours. Follow Up  Follow in bariatric clinic with Nunu Nolasco MD in 10 days. Your appointment should be already scheduled but if not, please call and make an appointment. Patient Education        Gastric Sleeve Surgery: What to Expect at Home  Your Recovery  Sleeve gastrectomy is surgery to remove part of the stomach to help with weight loss. The surgery, which is also called gastric sleeve surgery, limits the amount of food your stomach can hold. You will have some belly pain. You may need pain medicine for the first week or so after surgery. The cuts (incisions) that the doctor made may be tender and sore. Because the surgery makes your stomach smaller, you will get full more quickly when you eat. It's important to think of this surgery as a tool to help you lose weight. It's not an instant fix. You will still need to eat a healthy diet and get regular exercise. This will help you reach your weight goal and avoid regaining the weight you lose. It's common to have many different emotions after this surgery.  You may feel happy or excited as you begin to lose weight. But you may also feel overwhelmed or frustrated by the changes that you have to make in your diet, activity, and lifestyle. Talk with your doctor if you have concerns or questions. This care sheet gives you a general idea about how long it will take for you to recover. But each person recovers at a different pace. Follow the steps below to get better as quickly as possible. How can you care for yourself at home? Activity    · Rest when you feel tired. Getting enough sleep will help you recover.     · Try to walk each day. Start out by walking a little more than you did the day before. Bit by bit, increase the amount you walk. Walking boosts blood flow and helps prevent pneumonia and constipation.     · Avoid lifting anything that would make you strain. This may include heavy grocery bags and milk containers, a heavy briefcase or backpack, cat litter or dog food bags, a vacuum , or a child.     · Avoid strenuous activities, such as bicycle riding, jogging, weight lifting, or aerobic exercise, until your doctor says it is okay. Do not take part in any activity where you could be hit in the belly. This could be sports or playing with children.     · Hold a pillow over your incision when you cough or take deep breaths. This will support your belly and decrease your pain.     · Do breathing exercises at home as instructed by your doctor. This will help prevent pneumonia.     · You can shower. Pat the incision dry. Do not take a bath for the first 2 weeks, or until your doctor tells you it is okay.     · You may drive when you are no longer taking prescription pain medicine and can quickly move your foot from the gas pedal to the brake. You must also be able to sit comfortably for a long period of time, even if you do not plan to go far. You might get caught in traffic.     · You will probably need to take 2 to 4 weeks off from work.  It depends on the type of work you do and how you feel.     · Ask your doctor when it is okay for you to have sex. Diet    · Your doctor will give you specific instructions about what to eat after the surgery. For the first 2 to 6 weeks, you will need to follow a liquid or soft diet. Bit by bit, you will be able to add solid foods back into your diet.     · Have 5 or 6 small meals each day instead of 2 or 3 large meals. Chew each bite of food very well. Eat slowly. You may need to take 20 to 30 minutes to eat a meal.     · Avoid crusty breads, bagels, tough meats, raw vegetables, nuts and seeds (including crackers and breads that have nuts and seeds), and other foods that are hard to digest. If you feel full quickly, try to drink fluids between meals instead of with meals.     · Avoid fizzy drinks, such as soda pop.     · Avoid drinking with straws. This may help you swallow less air when you drink.     · Check with your doctor before drinking alcohol. Your body may absorb alcohol more quickly after surgery.     · You may notice that your bowel movements are not regular right after your surgery. This is common. Try to avoid constipation and straining with bowel movements. Take a fiber supplement every day. If you have not had a bowel movement after a couple of days, ask your doctor about taking a mild laxative. Medicines    · Your doctor will tell you if and when you can restart your medicines. He or she will also give you instructions about taking any new medicines.     · If you take aspirin or some other blood thinner, ask your doctor if and when to start taking it again. Make sure that you understand exactly what your doctor wants you to do.     · Take pain medicines exactly as directed. ? If the doctor gave you a prescription medicine for pain, take it as prescribed. ? Do not take two or more pain medicines at the same time unless the doctor told you to. Many pain medicines contain acetaminophen, which is Tylenol.  Too much acetaminophen (Tylenol) can be harmful. ? Do not take aspirin (Javy, Bufferin), ibuprofen (Advil, Motrin), or naproxen (Aleve) until your doctor says it is okay.     · If you think your pain medicine is making you sick to your stomach:  ? Take your medicine after meals (unless your doctor has told you not to). ? Ask your doctor for a different pain medicine.     · If your doctor prescribed antibiotics, take them as directed. Do not stop taking them just because you feel better. You need to take the full course of antibiotics. Incision care    · If you have strips of tape on the incision, leave the tape on for a week or until it falls off.     · Wash the area daily with warm, soapy water and pat it dry. Don't use hydrogen peroxide or alcohol, which can slow healing. You may cover the area with a gauze bandage if it weeps or rubs against clothing. Change the bandage every day.     · Keep the area clean and dry. Follow-up care is a key part of your treatment and safety. Be sure to make and go to all appointments, and call your doctor if you are having problems. It's also a good idea to know your test results and keep a list of the medicines you take. When should you call for help? Call 911 anytime you think you may need emergency care. For example, call if:    · You passed out (lost consciousness).     · You are short of breath. Call your doctor now or seek immediate medical care if:    · You have pain that does not get better after you take pain medicine.     · You cannot pass stool or gas.     · You are sick to your stomach and cannot drink fluids.     · You have loose stitches, or your incision comes open.     · You have signs of a blood clot, such as:  ? Pain in your calf, back of the knee, thigh, or groin. ? Redness and swelling in your leg or groin.     · You have signs of infection, such as:  ? Increased pain, swelling, warmth, or redness. ? Red streaks leading from the incision. ? Pus draining from the incision. ?  A fever. Watch closely for changes in your health, and be sure to contact your doctor if you have any problems. Where can you learn more? Go to http://www.gray.com/  Enter L324 in the search box to learn more about \"Gastric Sleeve Surgery: What to Expect at Home. \"  Current as of: December 11, 2019               Content Version: 12.6  © 8480-9758 [employer]. Care instructions adapted under license by [employer] (which disclaims liability or warranty for this information). If you have questions about a medical condition or this instruction, always ask your healthcare professional. [place] [address] any warranty or liability for your use of this information.

## 2021-07-26 PROBLEM — M75.41 SHOULDER IMPINGEMENT SYNDROME, RIGHT: Status: ACTIVE | Noted: 2020-05-14

## 2021-07-26 PROBLEM — M25.511 SHOULDER PAIN, RIGHT: Status: ACTIVE | Noted: 2020-05-20

## 2021-07-26 PROBLEM — E66.01 MORBID OBESITY (HCC): Status: RESOLVED | Noted: 2020-12-22 | Resolved: 2021-07-26

## 2021-07-26 PROBLEM — E66.01 OBESITY, MORBID (HCC): Status: RESOLVED | Noted: 2018-05-24 | Resolved: 2021-07-26

## 2022-01-17 ENCOUNTER — APPOINTMENT (OUTPATIENT)
Dept: CT IMAGING | Age: 52
End: 2022-01-17
Attending: EMERGENCY MEDICINE
Payer: COMMERCIAL

## 2022-01-17 LAB
ALBUMIN SERPL-MCNC: 3.2 G/DL (ref 3.5–5)
ALBUMIN/GLOB SERPL: 0.9 {RATIO} (ref 1.2–3.5)
ALP SERPL-CCNC: 72 U/L (ref 50–136)
ALT SERPL-CCNC: 37 U/L (ref 12–65)
ANION GAP SERPL CALC-SCNC: 6 MMOL/L (ref 7–16)
AST SERPL-CCNC: 22 U/L (ref 15–37)
BASOPHILS # BLD: 0.1 K/UL (ref 0–0.2)
BASOPHILS NFR BLD: 1 % (ref 0–2)
BILIRUB SERPL-MCNC: 0.7 MG/DL (ref 0.2–1.1)
BUN SERPL-MCNC: 12 MG/DL (ref 6–23)
CALCIUM SERPL-MCNC: 8.3 MG/DL (ref 8.3–10.4)
CHLORIDE SERPL-SCNC: 106 MMOL/L (ref 98–107)
CO2 SERPL-SCNC: 25 MMOL/L (ref 21–32)
CREAT SERPL-MCNC: 0.87 MG/DL (ref 0.8–1.5)
DIFFERENTIAL METHOD BLD: ABNORMAL
EOSINOPHIL # BLD: 0 K/UL (ref 0–0.8)
EOSINOPHIL NFR BLD: 0 % (ref 0.5–7.8)
ERYTHROCYTE [DISTWIDTH] IN BLOOD BY AUTOMATED COUNT: 14.2 % (ref 11.9–14.6)
GLOBULIN SER CALC-MCNC: 3.7 G/DL (ref 2.3–3.5)
GLUCOSE SERPL-MCNC: 228 MG/DL (ref 65–100)
HCT VFR BLD AUTO: 46.2 % (ref 41.1–50.3)
HGB BLD-MCNC: 15 G/DL (ref 13.6–17.2)
IMM GRANULOCYTES # BLD AUTO: 0.1 K/UL (ref 0–0.5)
IMM GRANULOCYTES NFR BLD AUTO: 0 % (ref 0–5)
LIPASE SERPL-CCNC: 104 U/L (ref 73–393)
LYMPHOCYTES # BLD: 1.9 K/UL (ref 0.5–4.6)
LYMPHOCYTES NFR BLD: 12 % (ref 13–44)
MCH RBC QN AUTO: 26.5 PG (ref 26.1–32.9)
MCHC RBC AUTO-ENTMCNC: 32.5 G/DL (ref 31.4–35)
MCV RBC AUTO: 81.5 FL (ref 79.6–97.8)
MONOCYTES # BLD: 0.7 K/UL (ref 0.1–1.3)
MONOCYTES NFR BLD: 4 % (ref 4–12)
NEUTS SEG # BLD: 13.5 K/UL (ref 1.7–8.2)
NEUTS SEG NFR BLD: 83 % (ref 43–78)
NRBC # BLD: 0 K/UL (ref 0–0.2)
PLATELET # BLD AUTO: 180 K/UL (ref 150–450)
PMV BLD AUTO: 10.6 FL (ref 9.4–12.3)
POTASSIUM SERPL-SCNC: 3.8 MMOL/L (ref 3.5–5.1)
PROT SERPL-MCNC: 6.9 G/DL (ref 6.3–8.2)
RBC # BLD AUTO: 5.67 M/UL (ref 4.23–5.6)
SODIUM SERPL-SCNC: 137 MMOL/L (ref 138–145)
WBC # BLD AUTO: 16.2 K/UL (ref 4.3–11.1)

## 2022-01-17 PROCEDURE — 74011250636 HC RX REV CODE- 250/636: Performed by: EMERGENCY MEDICINE

## 2022-01-17 PROCEDURE — 74011250637 HC RX REV CODE- 250/637: Performed by: EMERGENCY MEDICINE

## 2022-01-17 PROCEDURE — 85025 COMPLETE CBC W/AUTO DIFF WBC: CPT

## 2022-01-17 PROCEDURE — 99285 EMERGENCY DEPT VISIT HI MDM: CPT

## 2022-01-17 PROCEDURE — 80053 COMPREHEN METABOLIC PANEL: CPT

## 2022-01-17 PROCEDURE — 74011000636 HC RX REV CODE- 636: Performed by: EMERGENCY MEDICINE

## 2022-01-17 PROCEDURE — 74177 CT ABD & PELVIS W/CONTRAST: CPT

## 2022-01-17 PROCEDURE — 96375 TX/PRO/DX INJ NEW DRUG ADDON: CPT

## 2022-01-17 PROCEDURE — 83690 ASSAY OF LIPASE: CPT

## 2022-01-17 RX ORDER — BUTALBITAL, ACETAMINOPHEN AND CAFFEINE 50; 325; 40 MG/1; MG/1; MG/1
2 TABLET ORAL
Status: COMPLETED | OUTPATIENT
Start: 2022-01-17 | End: 2022-01-17

## 2022-01-17 RX ORDER — SODIUM CHLORIDE 0.9 % (FLUSH) 0.9 %
5-10 SYRINGE (ML) INJECTION EVERY 8 HOURS
Status: DISCONTINUED | OUTPATIENT
Start: 2022-01-17 | End: 2022-01-18

## 2022-01-17 RX ORDER — SODIUM CHLORIDE 0.9 % (FLUSH) 0.9 %
5-10 SYRINGE (ML) INJECTION AS NEEDED
Status: DISCONTINUED | OUTPATIENT
Start: 2022-01-17 | End: 2022-01-20 | Stop reason: HOSPADM

## 2022-01-17 RX ORDER — ONDANSETRON 2 MG/ML
4 INJECTION INTRAMUSCULAR; INTRAVENOUS
Status: COMPLETED | OUTPATIENT
Start: 2022-01-17 | End: 2022-01-17

## 2022-01-17 RX ADMIN — ONDANSETRON 4 MG: 2 INJECTION INTRAMUSCULAR; INTRAVENOUS at 22:35

## 2022-01-17 RX ADMIN — BUTALBITAL, ACETAMINOPHEN, AND CAFFEINE 2 TABLET: 50; 325; 40 TABLET ORAL at 22:36

## 2022-01-17 RX ADMIN — DIATRIZOATE MEGLUMINE AND DIATRIZOATE SODIUM 15 ML: 660; 100 LIQUID ORAL; RECTAL at 22:37

## 2022-01-18 ENCOUNTER — HOSPITAL ENCOUNTER (OUTPATIENT)
Age: 52
Setting detail: OBSERVATION
Discharge: HOME | End: 2022-01-20
Attending: EMERGENCY MEDICINE | Admitting: SURGERY
Payer: COMMERCIAL

## 2022-01-18 ENCOUNTER — ANESTHESIA (OUTPATIENT)
Dept: SURGERY | Age: 52
End: 2022-01-18
Payer: COMMERCIAL

## 2022-01-18 ENCOUNTER — ANESTHESIA EVENT (OUTPATIENT)
Dept: SURGERY | Age: 52
End: 2022-01-18
Payer: COMMERCIAL

## 2022-01-18 ENCOUNTER — HOSPITAL ENCOUNTER (OUTPATIENT)
Dept: ULTRASOUND IMAGING | Age: 52
Discharge: HOME | End: 2022-01-18
Attending: EMERGENCY MEDICINE
Payer: COMMERCIAL

## 2022-01-18 DIAGNOSIS — D72.825 BANDEMIA: ICD-10-CM

## 2022-01-18 DIAGNOSIS — R93.5 ABNORMAL CT OF THE ABDOMEN: ICD-10-CM

## 2022-01-18 DIAGNOSIS — N28.89 RENAL MASS, RIGHT: ICD-10-CM

## 2022-01-18 DIAGNOSIS — K81.0 ACUTE CHOLECYSTITIS: Primary | ICD-10-CM

## 2022-01-18 DIAGNOSIS — R93.5 ABNORMAL US (ULTRASOUND) OF ABDOMEN: ICD-10-CM

## 2022-01-18 DIAGNOSIS — K80.00 CALCULUS OF GALLBLADDER WITH ACUTE CHOLECYSTITIS WITHOUT OBSTRUCTION: ICD-10-CM

## 2022-01-18 LAB
COVID-19 RAPID TEST, COVR: NOT DETECTED
SOURCE, COVRS: NORMAL

## 2022-01-18 PROCEDURE — 74011250637 HC RX REV CODE- 250/637: Performed by: NURSE PRACTITIONER

## 2022-01-18 PROCEDURE — 74011000258 HC RX REV CODE- 258: Performed by: NURSE PRACTITIONER

## 2022-01-18 PROCEDURE — 96366 THER/PROPH/DIAG IV INF ADDON: CPT

## 2022-01-18 PROCEDURE — 74011250637 HC RX REV CODE- 250/637: Performed by: ANESTHESIOLOGY

## 2022-01-18 PROCEDURE — G0378 HOSPITAL OBSERVATION PER HR: HCPCS

## 2022-01-18 PROCEDURE — 96361 HYDRATE IV INFUSION ADD-ON: CPT

## 2022-01-18 PROCEDURE — 74011000636 HC RX REV CODE- 636: Performed by: EMERGENCY MEDICINE

## 2022-01-18 PROCEDURE — 87635 SARS-COV-2 COVID-19 AMP PRB: CPT

## 2022-01-18 PROCEDURE — 74011000250 HC RX REV CODE- 250: Performed by: NURSE ANESTHETIST, CERTIFIED REGISTERED

## 2022-01-18 PROCEDURE — 77030020829: Performed by: SURGERY

## 2022-01-18 PROCEDURE — 77030008606 HC TRCR ENDOSC KII AMR -B: Performed by: SURGERY

## 2022-01-18 PROCEDURE — 77030039425 HC BLD LARYNG TRULITE DISP TELE -A: Performed by: ANESTHESIOLOGY

## 2022-01-18 PROCEDURE — 77030040922 HC BLNKT HYPOTHRM STRY -A: Performed by: ANESTHESIOLOGY

## 2022-01-18 PROCEDURE — 74011000250 HC RX REV CODE- 250: Performed by: NURSE PRACTITIONER

## 2022-01-18 PROCEDURE — 96375 TX/PRO/DX INJ NEW DRUG ADDON: CPT

## 2022-01-18 PROCEDURE — 74011250636 HC RX REV CODE- 250/636: Performed by: NURSE ANESTHETIST, CERTIFIED REGISTERED

## 2022-01-18 PROCEDURE — 74011250636 HC RX REV CODE- 250/636: Performed by: EMERGENCY MEDICINE

## 2022-01-18 PROCEDURE — 74011000250 HC RX REV CODE- 250: Performed by: SURGERY

## 2022-01-18 PROCEDURE — 77030007955 HC PCH ENDOSC SPEC J&J -B: Performed by: SURGERY

## 2022-01-18 PROCEDURE — 76010000161 HC OR TIME 1 TO 1.5 HR INTENSV-TIER 1: Performed by: SURGERY

## 2022-01-18 PROCEDURE — 77030018390 HC SPNG HEMSTAT2 J&J -B: Performed by: SURGERY

## 2022-01-18 PROCEDURE — 77030008756 HC TU IRR SUC STRY -B: Performed by: SURGERY

## 2022-01-18 PROCEDURE — C9113 INJ PANTOPRAZOLE SODIUM, VIA: HCPCS | Performed by: NURSE PRACTITIONER

## 2022-01-18 PROCEDURE — 77030037088 HC TUBE ENDOTRACH ORAL NSL COVD-A: Performed by: ANESTHESIOLOGY

## 2022-01-18 PROCEDURE — 74011250636 HC RX REV CODE- 250/636: Performed by: NURSE PRACTITIONER

## 2022-01-18 PROCEDURE — 2709999900 HC NON-CHARGEABLE SUPPLY: Performed by: SURGERY

## 2022-01-18 PROCEDURE — 88304 TISSUE EXAM BY PATHOLOGIST: CPT

## 2022-01-18 PROCEDURE — 77030008518 HC TBNG INSUF ENDO STRY -B: Performed by: SURGERY

## 2022-01-18 PROCEDURE — 74011250637 HC RX REV CODE- 250/637: Performed by: EMERGENCY MEDICINE

## 2022-01-18 PROCEDURE — 96365 THER/PROPH/DIAG IV INF INIT: CPT

## 2022-01-18 PROCEDURE — 77030016151 HC PROTCTR LNS DFOG COVD -B: Performed by: SURGERY

## 2022-01-18 PROCEDURE — 77030031139 HC SUT VCRL2 J&J -A: Performed by: SURGERY

## 2022-01-18 PROCEDURE — 74011250636 HC RX REV CODE- 250/636: Performed by: SURGERY

## 2022-01-18 PROCEDURE — 77030040361 HC SLV COMPR DVT MDII -B: Performed by: SURGERY

## 2022-01-18 PROCEDURE — 76210000006 HC OR PH I REC 0.5 TO 1 HR: Performed by: SURGERY

## 2022-01-18 PROCEDURE — 77030012799 HC TRCR GELPRT BLN AMR -B: Performed by: SURGERY

## 2022-01-18 PROCEDURE — 77030002933 HC SUT MCRYL J&J -A: Performed by: SURGERY

## 2022-01-18 PROCEDURE — 76060000034 HC ANESTHESIA 1.5 TO 2 HR: Performed by: SURGERY

## 2022-01-18 PROCEDURE — 74011000258 HC RX REV CODE- 258: Performed by: EMERGENCY MEDICINE

## 2022-01-18 PROCEDURE — 47562 LAPAROSCOPIC CHOLECYSTECTOMY: CPT | Performed by: SURGERY

## 2022-01-18 PROCEDURE — 77030019908 HC STETH ESOPH SIMS -A: Performed by: ANESTHESIOLOGY

## 2022-01-18 PROCEDURE — 2709999900 HC NON-CHARGEABLE SUPPLY

## 2022-01-18 PROCEDURE — 74011250636 HC RX REV CODE- 250/636: Performed by: ANESTHESIOLOGY

## 2022-01-18 PROCEDURE — 99220 PR INITIAL OBSERVATION CARE/DAY 70 MINUTES: CPT | Performed by: SURGERY

## 2022-01-18 PROCEDURE — 96367 TX/PROPH/DG ADDL SEQ IV INF: CPT

## 2022-01-18 PROCEDURE — 77030000038 HC TIP SCIS LAPSCP SURI -B: Performed by: SURGERY

## 2022-01-18 RX ORDER — SODIUM CHLORIDE 9 MG/ML
100 INJECTION, SOLUTION INTRAVENOUS CONTINUOUS
Status: DISCONTINUED | OUTPATIENT
Start: 2022-01-18 | End: 2022-01-20 | Stop reason: HOSPADM

## 2022-01-18 RX ORDER — ROCURONIUM BROMIDE 10 MG/ML
INJECTION, SOLUTION INTRAVENOUS AS NEEDED
Status: DISCONTINUED | OUTPATIENT
Start: 2022-01-18 | End: 2022-01-18 | Stop reason: HOSPADM

## 2022-01-18 RX ORDER — NALOXONE HYDROCHLORIDE 0.4 MG/ML
0.4 INJECTION, SOLUTION INTRAMUSCULAR; INTRAVENOUS; SUBCUTANEOUS AS NEEDED
Status: DISCONTINUED | OUTPATIENT
Start: 2022-01-18 | End: 2022-01-20 | Stop reason: HOSPADM

## 2022-01-18 RX ORDER — NEOSTIGMINE METHYLSULFATE 1 MG/ML
INJECTION, SOLUTION INTRAVENOUS AS NEEDED
Status: DISCONTINUED | OUTPATIENT
Start: 2022-01-18 | End: 2022-01-18 | Stop reason: HOSPADM

## 2022-01-18 RX ORDER — HYDROMORPHONE HYDROCHLORIDE 1 MG/ML
0.5 INJECTION, SOLUTION INTRAMUSCULAR; INTRAVENOUS; SUBCUTANEOUS ONCE
Status: COMPLETED | OUTPATIENT
Start: 2022-01-18 | End: 2022-01-18

## 2022-01-18 RX ORDER — PROPOFOL 10 MG/ML
INJECTION, EMULSION INTRAVENOUS AS NEEDED
Status: DISCONTINUED | OUTPATIENT
Start: 2022-01-18 | End: 2022-01-18 | Stop reason: HOSPADM

## 2022-01-18 RX ORDER — FENTANYL CITRATE 50 UG/ML
INJECTION, SOLUTION INTRAMUSCULAR; INTRAVENOUS AS NEEDED
Status: DISCONTINUED | OUTPATIENT
Start: 2022-01-18 | End: 2022-01-18 | Stop reason: HOSPADM

## 2022-01-18 RX ORDER — DEXAMETHASONE SODIUM PHOSPHATE 4 MG/ML
INJECTION, SOLUTION INTRA-ARTICULAR; INTRALESIONAL; INTRAMUSCULAR; INTRAVENOUS; SOFT TISSUE AS NEEDED
Status: DISCONTINUED | OUTPATIENT
Start: 2022-01-18 | End: 2022-01-18 | Stop reason: HOSPADM

## 2022-01-18 RX ORDER — HYDROMORPHONE HYDROCHLORIDE 2 MG/ML
0.5 INJECTION, SOLUTION INTRAMUSCULAR; INTRAVENOUS; SUBCUTANEOUS
Status: DISCONTINUED | OUTPATIENT
Start: 2022-01-18 | End: 2022-01-18 | Stop reason: HOSPADM

## 2022-01-18 RX ORDER — LIDOCAINE HYDROCHLORIDE 10 MG/ML
0.1 INJECTION INFILTRATION; PERINEURAL AS NEEDED
Status: DISCONTINUED | OUTPATIENT
Start: 2022-01-18 | End: 2022-01-18 | Stop reason: HOSPADM

## 2022-01-18 RX ORDER — SODIUM CHLORIDE 0.9 % (FLUSH) 0.9 %
5-40 SYRINGE (ML) INJECTION AS NEEDED
Status: DISCONTINUED | OUTPATIENT
Start: 2022-01-18 | End: 2022-01-20 | Stop reason: HOSPADM

## 2022-01-18 RX ORDER — HYDROCODONE BITARTRATE AND ACETAMINOPHEN 5; 325 MG/1; MG/1
2 TABLET ORAL AS NEEDED
Status: DISCONTINUED | OUTPATIENT
Start: 2022-01-18 | End: 2022-01-18 | Stop reason: HOSPADM

## 2022-01-18 RX ORDER — FENTANYL CITRATE 50 UG/ML
100 INJECTION, SOLUTION INTRAMUSCULAR; INTRAVENOUS ONCE
Status: DISCONTINUED | OUTPATIENT
Start: 2022-01-18 | End: 2022-01-18 | Stop reason: HOSPADM

## 2022-01-18 RX ORDER — METOCLOPRAMIDE HYDROCHLORIDE 5 MG/ML
10 INJECTION INTRAMUSCULAR; INTRAVENOUS
Status: COMPLETED | OUTPATIENT
Start: 2022-01-18 | End: 2022-01-18

## 2022-01-18 RX ORDER — KETOROLAC TROMETHAMINE 30 MG/ML
INJECTION, SOLUTION INTRAMUSCULAR; INTRAVENOUS AS NEEDED
Status: DISCONTINUED | OUTPATIENT
Start: 2022-01-18 | End: 2022-01-18 | Stop reason: HOSPADM

## 2022-01-18 RX ORDER — CEFAZOLIN SODIUM/WATER 2 G/20 ML
2 SYRINGE (ML) INTRAVENOUS
Status: COMPLETED | OUTPATIENT
Start: 2022-01-18 | End: 2022-01-18

## 2022-01-18 RX ORDER — ONDANSETRON 2 MG/ML
4 INJECTION INTRAMUSCULAR; INTRAVENOUS
Status: DISCONTINUED | OUTPATIENT
Start: 2022-01-18 | End: 2022-01-20 | Stop reason: HOSPADM

## 2022-01-18 RX ORDER — OXYCODONE AND ACETAMINOPHEN 5; 325 MG/1; MG/1
1 TABLET ORAL
Status: DISCONTINUED | OUTPATIENT
Start: 2022-01-18 | End: 2022-01-20 | Stop reason: HOSPADM

## 2022-01-18 RX ORDER — MIDAZOLAM HYDROCHLORIDE 1 MG/ML
4 INJECTION, SOLUTION INTRAMUSCULAR; INTRAVENOUS ONCE
Status: DISCONTINUED | OUTPATIENT
Start: 2022-01-18 | End: 2022-01-18 | Stop reason: HOSPADM

## 2022-01-18 RX ORDER — SODIUM CHLORIDE 0.9 % (FLUSH) 0.9 %
10 SYRINGE (ML) INJECTION
Status: COMPLETED | OUTPATIENT
Start: 2022-01-18 | End: 2022-01-18

## 2022-01-18 RX ORDER — BUPIVACAINE HYDROCHLORIDE 5 MG/ML
INJECTION, SOLUTION EPIDURAL; INTRACAUDAL AS NEEDED
Status: DISCONTINUED | OUTPATIENT
Start: 2022-01-18 | End: 2022-01-18 | Stop reason: HOSPADM

## 2022-01-18 RX ORDER — LIDOCAINE HYDROCHLORIDE 20 MG/ML
INJECTION, SOLUTION EPIDURAL; INFILTRATION; INTRACAUDAL; PERINEURAL AS NEEDED
Status: DISCONTINUED | OUTPATIENT
Start: 2022-01-18 | End: 2022-01-18 | Stop reason: HOSPADM

## 2022-01-18 RX ORDER — FAMOTIDINE 20 MG/1
20 TABLET, FILM COATED ORAL
Status: COMPLETED | OUTPATIENT
Start: 2022-01-18 | End: 2022-01-18

## 2022-01-18 RX ORDER — SODIUM CHLORIDE, SODIUM LACTATE, POTASSIUM CHLORIDE, CALCIUM CHLORIDE 600; 310; 30; 20 MG/100ML; MG/100ML; MG/100ML; MG/100ML
75 INJECTION, SOLUTION INTRAVENOUS CONTINUOUS
Status: DISCONTINUED | OUTPATIENT
Start: 2022-01-18 | End: 2022-01-18 | Stop reason: HOSPADM

## 2022-01-18 RX ORDER — OXYCODONE HYDROCHLORIDE 5 MG/1
5 TABLET ORAL
Status: COMPLETED | OUTPATIENT
Start: 2022-01-18 | End: 2022-01-18

## 2022-01-18 RX ORDER — SODIUM CHLORIDE, SODIUM LACTATE, POTASSIUM CHLORIDE, CALCIUM CHLORIDE 600; 310; 30; 20 MG/100ML; MG/100ML; MG/100ML; MG/100ML
75 INJECTION, SOLUTION INTRAVENOUS CONTINUOUS
Status: DISCONTINUED | OUTPATIENT
Start: 2022-01-18 | End: 2022-01-20 | Stop reason: HOSPADM

## 2022-01-18 RX ORDER — ONDANSETRON 2 MG/ML
INJECTION INTRAMUSCULAR; INTRAVENOUS AS NEEDED
Status: DISCONTINUED | OUTPATIENT
Start: 2022-01-18 | End: 2022-01-18 | Stop reason: HOSPADM

## 2022-01-18 RX ORDER — SODIUM CHLORIDE 0.9 % (FLUSH) 0.9 %
5-40 SYRINGE (ML) INJECTION EVERY 8 HOURS
Status: DISCONTINUED | OUTPATIENT
Start: 2022-01-18 | End: 2022-01-20 | Stop reason: HOSPADM

## 2022-01-18 RX ORDER — MIDAZOLAM HYDROCHLORIDE 1 MG/ML
2 INJECTION, SOLUTION INTRAMUSCULAR; INTRAVENOUS
Status: DISCONTINUED | OUTPATIENT
Start: 2022-01-18 | End: 2022-01-18 | Stop reason: HOSPADM

## 2022-01-18 RX ORDER — GLYCOPYRROLATE 0.2 MG/ML
INJECTION INTRAMUSCULAR; INTRAVENOUS AS NEEDED
Status: DISCONTINUED | OUTPATIENT
Start: 2022-01-18 | End: 2022-01-18 | Stop reason: HOSPADM

## 2022-01-18 RX ADMIN — ROCURONIUM BROMIDE 10 MG: 10 INJECTION, SOLUTION INTRAVENOUS at 18:45

## 2022-01-18 RX ADMIN — IOPAMIDOL 100 ML: 755 INJECTION, SOLUTION INTRAVENOUS at 01:00

## 2022-01-18 RX ADMIN — PIPERACILLIN SODIUM AND TAZOBACTAM SODIUM 4.5 G: 4; .5 INJECTION, POWDER, LYOPHILIZED, FOR SOLUTION INTRAVENOUS at 09:50

## 2022-01-18 RX ADMIN — SODIUM CHLORIDE 1000 ML: 900 INJECTION, SOLUTION INTRAVENOUS at 01:00

## 2022-01-18 RX ADMIN — DEXAMETHASONE SODIUM PHOSPHATE 4 MG: 4 INJECTION, SOLUTION INTRAMUSCULAR; INTRAVENOUS at 18:37

## 2022-01-18 RX ADMIN — METOCLOPRAMIDE HYDROCHLORIDE 10 MG: 5 INJECTION INTRAMUSCULAR; INTRAVENOUS at 01:01

## 2022-01-18 RX ADMIN — ROCURONIUM BROMIDE 10 MG: 10 INJECTION, SOLUTION INTRAVENOUS at 18:37

## 2022-01-18 RX ADMIN — SODIUM CHLORIDE, PRESERVATIVE FREE 40 MG: 5 INJECTION INTRAVENOUS at 09:50

## 2022-01-18 RX ADMIN — ONDANSETRON 4 MG: 2 INJECTION INTRAMUSCULAR; INTRAVENOUS at 18:37

## 2022-01-18 RX ADMIN — Medication 2 G: at 18:34

## 2022-01-18 RX ADMIN — ROCURONIUM BROMIDE 5 MG: 10 INJECTION, SOLUTION INTRAVENOUS at 19:02

## 2022-01-18 RX ADMIN — OXYCODONE 5 MG: 5 TABLET ORAL at 20:17

## 2022-01-18 RX ADMIN — SODIUM CHLORIDE 100 ML: 900 INJECTION, SOLUTION INTRAVENOUS at 01:00

## 2022-01-18 RX ADMIN — FAMOTIDINE 20 MG: 20 TABLET ORAL at 01:04

## 2022-01-18 RX ADMIN — KETOROLAC TROMETHAMINE 30 MG: 30 INJECTION, SOLUTION INTRAMUSCULAR; INTRAVENOUS at 19:25

## 2022-01-18 RX ADMIN — ROCURONIUM BROMIDE 40 MG: 10 INJECTION, SOLUTION INTRAVENOUS at 18:24

## 2022-01-18 RX ADMIN — OXYCODONE HYDROCHLORIDE AND ACETAMINOPHEN 1 TABLET: 5; 325 TABLET ORAL at 21:19

## 2022-01-18 RX ADMIN — SODIUM CHLORIDE, PRESERVATIVE FREE 10 ML: 5 INJECTION INTRAVENOUS at 21:34

## 2022-01-18 RX ADMIN — SODIUM CHLORIDE 100 ML/HR: 900 INJECTION, SOLUTION INTRAVENOUS at 06:43

## 2022-01-18 RX ADMIN — GLYCOPYRROLATE 0.6 MG: 0.2 INJECTION, SOLUTION INTRAMUSCULAR; INTRAVENOUS at 19:25

## 2022-01-18 RX ADMIN — PIPERACILLIN SODIUM AND TAZOBACTAM SODIUM 4.5 G: 4; .5 INJECTION, POWDER, LYOPHILIZED, FOR SOLUTION INTRAVENOUS at 01:07

## 2022-01-18 RX ADMIN — ROCURONIUM BROMIDE 5 MG: 10 INJECTION, SOLUTION INTRAVENOUS at 18:51

## 2022-01-18 RX ADMIN — FENTANYL CITRATE 100 MCG: 50 INJECTION INTRAMUSCULAR; INTRAVENOUS at 18:18

## 2022-01-18 RX ADMIN — SODIUM CHLORIDE, SODIUM LACTATE, POTASSIUM CHLORIDE, AND CALCIUM CHLORIDE 75 ML/HR: 600; 310; 30; 20 INJECTION, SOLUTION INTRAVENOUS at 18:11

## 2022-01-18 RX ADMIN — LIDOCAINE HYDROCHLORIDE 100 MG: 20 INJECTION, SOLUTION EPIDURAL; INFILTRATION; INTRACAUDAL; PERINEURAL at 18:24

## 2022-01-18 RX ADMIN — Medication 4 MG: at 19:25

## 2022-01-18 RX ADMIN — PROPOFOL 200 MG: 10 INJECTION, EMULSION INTRAVENOUS at 18:24

## 2022-01-18 RX ADMIN — HYDROMORPHONE HYDROCHLORIDE 0.5 MG: 1 INJECTION, SOLUTION INTRAMUSCULAR; INTRAVENOUS; SUBCUTANEOUS at 01:02

## 2022-01-18 RX ADMIN — SODIUM CHLORIDE, SODIUM LACTATE, POTASSIUM CHLORIDE, AND CALCIUM CHLORIDE 75 ML/HR: 600; 310; 30; 20 INJECTION, SOLUTION INTRAVENOUS at 21:19

## 2022-01-18 RX ADMIN — Medication 10 ML: at 01:00

## 2022-01-18 NOTE — PROGRESS NOTES
01/18/22 1549   Dual Skin Pressure Injury Assessment   Dual Skin Pressure Injury Assessment WDL   Second Care Provider (Based on [address] [place] [address]) EvelynRN   Skin Integumentary   Skin Integumentary (WDL) X    Pressure  Injury Documentation No Pressure Injury Noted-Pressure Ulcer Prevention Initiated   Skin Color Appropriate for ethnicity   Skin Condition/Temp Dry; Warm   Skin Integrity Laceration (comment)  (old bypass incisions)   Wound Prevention and Protection Methods   Orientation of Wound Prevention Mid;Posterior   Location of Wound Prevention Sacrum/Coccyx   Dressing Present  No;Intact, not due to be changed   Wound Offloading (Prevention Methods) Bed, pressure reduction mattress;Repositioning

## 2022-01-18 NOTE — PROGRESS NOTES
END OF SHIFT NOTE:    INTAKE/OUTPUT  No intake/output data recorded. Voiding: YES  Catheter: NO  Drain:              Flatus: Patient does have flatus present. Stool:  0 occurrences. Characteristics:       Emesis: 0 occurrences. Characteristics:        VITAL SIGNS  Patient Vitals for the past 12 hrs:   Temp Pulse Resp BP SpO2   01/18/22 1523 98.4 °F (36.9 °C) 82 17 (!) 127/91 95 %   01/18/22 1427    139/68    01/18/22 1357    128/79 95 %   01/18/22 1327    129/84 94 %   01/18/22 1257    127/79 95 %   01/18/22 1227    126/69 94 %   01/18/22 1157    129/84 94 %   01/18/22 1127    129/83 94 %   01/18/22 1057    127/76 94 %   01/18/22 0700    117/80 92 %   01/18/22 0645    125/74 94 %   01/18/22 0600    124/71 93 %   01/18/22 0530    116/74 92 %   01/18/22 0500    111/84 93 %   01/18/22 0430    113/71 93 %       Pain Assessment  Pain Intensity 1: 0 (01/18/22 1547)        Patient Stated Pain Goal: 0    Ambulating  Yes    Shift report given to oncoming nurse at the bedside.     Fabian Linder RN

## 2022-01-18 NOTE — H&P
Juliano Muhammad MD   Bariatric & Advanced Laparoscopic Surgery & Endoscopy  [address] [facility] [address], [address] [address]  Phone [phone]   Fax [phone]      Date of visit: 2022      Primary/Requesting provider: Courtney Andrews MD         Name: Anh De León      MRN: [id number]       : 1970       Age: 45 y.o. Sex: male        PCP: Courtney Andrews MD     CC:    Chief Complaint   Patient presents with    Abdominal Pain       HPI:    Anh De León is a 45 y.o. male who presents for evaluation of RUQ pain that started 3 days ago. Pain is 5/10. Pain radiates to back. Pain is better with pain medication and worse with nothing. Pain is associated with fever, chills and headaches. No nausea/vomiting.     Previous abdominal surgeries - sleeve gastrectomy  Last colonoscopy - never    Blood thinners - none  Immunosuppressants - none        PMH:    Past Medical History:   Diagnosis Date    Anemia     Anxiety     No meds     Chronic pain     Depression     GERD (gastroesophageal reflux disease)     Hypercholesterolemia     Pt denies     Hypertension     Prediabetes     Pt denies     Sleep apnea     C-pap broken, does not use        PSH:    Past Surgical History:   Procedure Laterality Date    HX COLONOSCOPY      HX ENDOSCOPY      HX GI      HX OTHER SURGICAL      Pt states \"bumps removed from lower back\"        MEDS:    Current Facility-Administered Medications   Medication    sodium chloride (NS) flush 5-40 mL    sodium chloride (NS) flush 5-40 mL    0.9% sodium chloride infusion    oxyCODONE-acetaminophen (PERCOCET) 5-325 mg per tablet 1 Tablet    pantoprazole (PROTONIX) 40 mg in 0.9% sodium chloride 10 mL injection    ondansetron (ZOFRAN) injection 4 mg    naloxone (NARCAN) injection 0.4 mg    piperacillin-tazobactam (ZOSYN) 4.5 g in 0.9% sodium chloride (MBP/ADV) 100 mL MBP    sodium chloride (NS) flush 5-10 mL    sodium chloride (NS) flush 5-10 mL     Current Outpatient Medications   Medication Sig    FLUoxetine (PROzac) 20 mg capsule Take 1 Capsule by mouth daily. 1 every other day for 2 weeks, then 1 a day  Indications: anxiousness associated with depression    pantoprazole (PROTONIX) 40 mg tablet Take 1 Tablet by mouth daily as needed for Pain (Heartburn).  metFORMIN (GLUCOPHAGE) 500 mg tablet Take 1 Tablet by mouth two (2) times a day.  multivitamin (ONE A DAY) tablet Take 1 Tablet by mouth daily.  lisinopriL (PRINIVIL, ZESTRIL) 10 mg tablet Take 1 Tablet by mouth daily.  pravastatin (PRAVACHOL) 40 mg tablet Take 1 Tablet by mouth nightly.  ondansetron hcl (ZOFRAN) 8 mg tablet Take 1 Tab by mouth every eight (8) hours as needed for Nausea or Vomiting. Indications: prevent nausea and vomiting after surgery    sucralfate (Carafate) 1 gram tablet Take 1 Tab by mouth four (4) times daily. Indications: gastroesophageal reflux disease    acetaminophen (TYLENOL EXTRA STRENGTH) 500 mg tablet Take  by mouth every six (6) hours as needed for Pain. ALLERGIES:      No Known Allergies    SH:    Social History     Tobacco Use    Smoking status: Former Smoker     Years: 4.00     Quit date: 2016     Years since quittin.6    Smokeless tobacco: Never Used   Vaping Use    Vaping Use: Never used   Substance Use Topics    Alcohol use: Yes     Comment: occas    Drug use: Never       FH:    Family History   Problem Relation Age of Onset    OSTEOARTHRITIS Mother     No Known Problems Father        Review of systems:  Review of Systems   Constitutional: Positive for chills and fever. Negative for weight loss. HENT: Negative for sore throat. Eyes: Negative for discharge and redness. Respiratory: Negative for cough, hemoptysis, shortness of breath and stridor. Cardiovascular: Negative for chest pain and palpitations. Gastrointestinal: Positive for abdominal pain.  Negative for blood in stool, constipation, diarrhea, heartburn, melena, nausea and vomiting. Genitourinary: Negative for dysuria and hematuria. Musculoskeletal: Negative for back pain, falls and joint pain. Skin: Negative for itching and rash. Neurological: Positive for headaches. Negative for sensory change, speech change, focal weakness and loss of consciousness. Endo/Heme/Allergies: Does not bruise/bleed easily. Psychiatric/Behavioral: Negative for memory loss. The patient is not nervous/anxious and does not have insomnia. Physical Exam:     Visit Vitals  /80   Pulse 87   Temp 98.3 °F (36.8 °C)   Resp 16   Ht 5' 6\" (1.676 m)   Wt 197 lb (89.4 kg)   SpO2 92%   BMI 31.80 kg/m²       General:  Well-developed, well-nourished, no distress. Psych:  Cooperative, good insight and judgement. Neuro:  Alert, oriented to person, place and time. HEENT:  Normocephalic, atraumatic. Sclera clear. Lungs:  Unlabored breathing. Symmetrical chest expansion. Chest wall:  No tenderness or deformity. Heart:  Regular rate and rhythm. No JVD. Abdomen:  Soft, SAUD tenderness, non-distended. No guarding or rebound. No palpable masses. Extremities:  Extremities normal, atraumatic, no cyanosis or edema. Skin:  Skin color, texture, turgor normal. No rashes. Labs: All recent labs were reviewed. Elevated WBC.      Recent Results (from the past 24 hour(s))   CBC WITH AUTOMATED DIFF    Collection Time: 01/17/22  7:29 PM   Result Value Ref Range    WBC 16.2 (H) 4.3 - 11.1 K/uL    RBC 5.67 (H) 4.23 - 5.6 M/uL    HGB 15.0 13.6 - 17.2 g/dL    HCT 46.2 41.1 - 50.3 %    MCV 81.5 79.6 - 97.8 FL    MCH 26.5 26.1 - 32.9 PG    MCHC 32.5 31.4 - 35.0 g/dL    RDW 14.2 11.9 - 14.6 %    PLATELET 881 242 - 801 K/uL    MPV 10.6 9.4 - 12.3 FL    ABSOLUTE NRBC 0.00 0.0 - 0.2 K/uL    DF AUTOMATED      NEUTROPHILS 83 (H) 43 - 78 %    LYMPHOCYTES 12 (L) 13 - 44 %    MONOCYTES 4 4.0 - 12.0 %    EOSINOPHILS 0 (L) 0.5 - 7.8 %    BASOPHILS 1 0.0 - 2.0 % IMMATURE GRANULOCYTES 0 0.0 - 5.0 %    ABS. NEUTROPHILS 13.5 (H) 1.7 - 8.2 K/UL    ABS. LYMPHOCYTES 1.9 0.5 - 4.6 K/UL    ABS. MONOCYTES 0.7 0.1 - 1.3 K/UL    ABS. EOSINOPHILS 0.0 0.0 - 0.8 K/UL    ABS. BASOPHILS 0.1 0.0 - 0.2 K/UL    ABS. IMM. GRANS. 0.1 0.0 - 0.5 K/UL   METABOLIC PANEL, COMPREHENSIVE    Collection Time: 01/17/22  7:29 PM   Result Value Ref Range    Sodium 137 (L) 138 - 145 mmol/L    Potassium 3.8 3.5 - 5.1 mmol/L    Chloride 106 98 - 107 mmol/L    CO2 25 21 - 32 mmol/L    Anion gap 6 (L) 7 - 16 mmol/L    Glucose 228 (H) 65 - 100 mg/dL    BUN 12 6 - 23 MG/DL    Creatinine 0.87 0.8 - 1.5 MG/DL    GFR est AA >60 >60 ml/min/1.73m2    GFR est non-AA >60 >60 ml/min/1.73m2    Calcium 8.3 8.3 - 10.4 MG/DL    Bilirubin, total 0.7 0.2 - 1.1 MG/DL    ALT (SGPT) 37 12 - 65 U/L    AST (SGOT) 22 15 - 37 U/L    Alk. phosphatase 72 50 - 136 U/L    Protein, total 6.9 6.3 - 8.2 g/dL    Albumin 3.2 (L) 3.5 - 5.0 g/dL    Globulin 3.7 (H) 2.3 - 3.5 g/dL    A-G Ratio 0.9 (L) 1.2 - 3.5     LIPASE    Collection Time: 01/17/22  7:29 PM   Result Value Ref Range    Lipase 104 73 - 393 U/L       Imaging: CT images were independently reviewed by me. Inflamed gallbladder concerning for cholecystitis. Large right kidney mass concerning for RCC    CT ABD PELV W CONT    Result Date: 1/18/2022  1. Gallbladder wall thickening/edema, pericholecystic fat stranding and suggestion of large noncalcified gallstone. Findings concerning for acute cholecystitis. Consider follow-up gallbladder ultrasound to confirm. 2. Large 7.5 cm heterogeneously enhancing mass originating from midportion of the right kidney, with central cystic component. Findings presumed renal cell carcinoma until proven otherwise. No evidence of renal vein invasion. Urology referral recommended. 3. A 5 cm left adrenal myelolipoma. DC4     US ABD LTD    Result Date: 1/18/2022  1. Cholelithiasis and gallbladder wall thickening/edema.  Ultrasound findings, in conjunction with findings on the earlier CT study, compatible with acute cholecystitis. 2. Negative for biliary ductal dilatation. 3. Nodular contour of the liver, suggesting cirrhosis. 4. Large soft tissue mass in the right kidney, corresponding to the abnormality noted on the earlier CT, presumed renal cell carcinoma until proven otherwise. Urology referral recommended. [address] [facility] [facility]    Diagnosis Date Noted    Acute cholecystitis 01/18/2022       Assessment/Plan:  Raisa Guillen is a 45 y.o. male who has signs and symptoms consistent with acute cholecystitis    We discussed the gallbladder disease pathophysiology and patient verbalized understanding. I recommend a laparoscopic, possible open, cholecystectomy. I discussed risks, benefits and alternatives of surgery. Patient understood and agreed to proceed with cholecystectomy. We discussed risks of cholecystectomy including but not limited to pain, infection, bleeding, scar, conversion from laparoscopic to open, injury to organs, enterotomy, cautery injury, hernia, injury to bile ducts, biliary leak, biliary stricture, retained stone, need for additional procedures, need for cholangiography, change in bowel habits. Large mass in right kidney noted and Urology consulted for evaluation and recommendations. I spent greater than 50% of this 70 minutes visit counseling and coordinating care for this patient.       Signed: Paul Coto MD  Bariatric & Minimally Invasive Surgery  1/18/2022 8:15 AM

## 2022-01-18 NOTE — PERIOP NOTES
TRANSFER - IN REPORT:    Verbal report received from [address] on [facility]  being received from [address] for ordered procedure      Report consisted of patients Situation, Background, Assessment and   Recommendations(SBAR). Information from the following report(s) Pre Procedure Checklist was reviewed with the receiving nurse. Opportunity for questions and clarification was provided. Assessment completed upon patients arrival to unit and care assumed.          2

## 2022-01-18 NOTE — ROUTINE PROCESS
TRANSFER - IN REPORT:    Verbal report received from ElizabethRN(name) on [address]  being received from ED(unit) for routine progression of care      Report consisted of patients Situation, Background, Assessment and   Recommendations(SBAR). Information from the following report(s) SBAR, Kardex, Procedure Summary, Intake/Output and MAR was reviewed with the receiving nurse. Opportunity for questions and clarification was provided. Assessment completed upon patients arrival to unit and care assumed.

## 2022-01-18 NOTE — ANESTHESIA PREPROCEDURE EVALUATION
Relevant Problems   No relevant active problems       Anesthetic History   No history of anesthetic complications            Review of Systems / Medical History  Patient summary reviewed, nursing notes reviewed and pertinent labs reviewed    Pulmonary        Sleep apnea: CPAP           Neuro/Psych   Within defined limits           Cardiovascular    Hypertension              Exercise tolerance: >4 METS     GI/Hepatic/Renal     GERD: well controlled           Endo/Other        Obesity     Other Findings              Physical Exam    Airway  Mallampati: II  TM Distance: 4 - 6 cm  Neck ROM: normal range of motion   Mouth opening: Normal     Cardiovascular  Regular rate and rhythm,  S1 and S2 normal,  no murmur, click, rub, or gallop             Dental  No notable dental hx       Pulmonary  Breath sounds clear to auscultation               Abdominal         Other Findings            Anesthetic Plan    ASA: 3  Anesthesia type: general          Induction: Intravenous  Anesthetic plan and risks discussed with: Patient

## 2022-01-18 NOTE — PROGRESS NOTES
TRANSFER - OUT REPORT:    Verbal report given to OLYA Narayan(name) on [address]  being transferred to preop(unit) for ordered procedure       Report consisted of patients Situation, Background, Assessment and   Recommendations(SBAR). Information from the following report(s) SBAR, Kardex, Intake/Output and MAR was reviewed with the receiving nurse. Lines:   Peripheral IV 01/17/22 Right Antecubital (Active)   Site Assessment Clean, dry, & intact 01/18/22 1548   Phlebitis Assessment 0 01/18/22 1548   Infiltration Assessment 0 01/18/22 1548   Dressing Status Clean, dry, & intact 01/18/22 1548   Dressing Type Tape;Transparent 01/18/22 1548   Hub Color/Line Status Infusing 01/18/22 1548        Opportunity for questions and clarification was provided.       Patient transported with:   [facility]

## 2022-01-18 NOTE — PROGRESS NOTES
Chart reviewed by  for discharge planning. No needs identified at this time. Please consult  if any new issues arise. Care Management Interventions  PCP Verified by CM:  (Dr Deneise Sandifer)  Mode of Transport at Discharge:  Other (see comment) (family)  Transition of Care Consult (CM Consult): Discharge Planning (Pt is insured by [employer] with pharmacy benefits.  )  Discharge Durable Medical Equipment: No  Physical Therapy Consult: No  Occupational Therapy Consult: No  Speech Therapy Consult: No  Support Systems: Other Family Member(s)  Confirm Follow Up Transport: Family  [employer] Information Provided?: No  Discharge Location  Discharge Placement: Home

## 2022-01-18 NOTE — ED TRIAGE NOTES
Pt states that he is having lower right quadrant abdominal pain since Saturday. Pt states he had a fever and headaches over the weekend. Pt denies any vomiting or diarrhea.

## 2022-01-18 NOTE — CONSULTS
Urology Consult     Patient: Ellyn Durán MRN: [id number]  SSN: [id number]    YOB: 1970  Age: 45 y.o. Sex: male      Subjective:      Ellyn Durán is a 45 y.o. male with hx of gastric bypass surgery x 1 year ago who presents to ER with c/o right sided and right lower quadrant abd pain. Reports subjective fevers and nausea. CT AP with contrast shows concerns for acute cholecystitis. Also shows large 7.5 cm heterogeneously enhancing mass originating from midportion of R kidney with central cystic component. We are consulted. Plans are for gen surgery to do laparoscopic cholecystectomy today. Cr is 0.87. No UA. No prior imaging. No prior urological hx according to chart. Past Medical History:   Diagnosis Date    Anemia     Anxiety     No meds     Chronic pain     Depression     GERD (gastroesophageal reflux disease)     Hypercholesterolemia     Pt denies     Hypertension     Prediabetes     Pt denies     Sleep apnea     C-pap broken, does not use      Past Surgical History:   Procedure Laterality Date    HX COLONOSCOPY      HX ENDOSCOPY      HX GI      HX OTHER SURGICAL      Pt states \"bumps removed from lower back\"       Family History   Problem Relation Age of Onset    OSTEOARTHRITIS Mother     No Known Problems Father      Social History     Tobacco Use    Smoking status: Former Smoker     Years: 4.00     Quit date: 2016     Years since quittin.6    Smokeless tobacco: Never Used   Substance Use Topics    Alcohol use: Yes     Comment: occas      Prior to Admission medications    Medication Sig Start Date End Date Taking? Authorizing Provider   FLUoxetine (PROzac) 20 mg capsule Take 1 Capsule by mouth daily. 1 every other day for 2 weeks, then 1 a day  Indications: anxiousness associated with depression 21   Kush Luu MD   pantoprazole (PROTONIX) 40 mg tablet Take 1 Tablet by mouth daily as needed for Pain (Heartburn).  21   Bell Kush MONTANA MD   metFORMIN (GLUCOPHAGE) 500 mg tablet Take 1 Tablet by mouth two (2) times a day. 7/26/21   Jeyson Luu MD   multivitamin (ONE A DAY) tablet Take 1 Tablet by mouth daily. 7/26/21   Kush Luu MD   lisinopriL (PRINIVIL, ZESTRIL) 10 mg tablet Take 1 Tablet by mouth daily. 7/26/21   Kush Luu MD   pravastatin (PRAVACHOL) 40 mg tablet Take 1 Tablet by mouth nightly. 7/26/21   Jeyson Luu MD   ondansetron hcl (ZOFRAN) 8 mg tablet Take 1 Tab by mouth every eight (8) hours as needed for Nausea or Vomiting. Indications: prevent nausea and vomiting after surgery 12/8/20   Devi Gambino NP   sucralfate (Carafate) 1 gram tablet Take 1 Tab by mouth four (4) times daily. Indications: gastroesophageal reflux disease 12/8/20   Devi Gambino NP   acetaminophen (TYLENOL EXTRA STRENGTH) 500 mg tablet Take  by mouth every six (6) hours as needed for Pain. Provider, Historical        No Known Allergies    Review of Systems:  A comprehensive review of systems was negative except for that written in the History of Present Illness. Objective:     Vitals:    01/18/22 0530 01/18/22 0600 01/18/22 0645 01/18/22 0700   BP: 116/74 124/71 125/74 117/80   Pulse:       Resp:       Temp:       SpO2: 92% 93% 94% 92%   Weight:       Height:                Assessment:     Hospital Problems  Date Reviewed: 12/8/2020            Codes Class Noted POA    Acute cholecystitis ICD-10-CM: K81.0  ICD-9-CM: 575.0  1/18/2022 Unknown              R renal mass. Plan:     Pt not seen/examined today. Chart reviewed and discussed with gen surgery and with Dr. Jensen Shaw. Pt is going to OR today for cholecystectomy. He will need further evaluation of renal mass vs cyst with CT renal mass protocol w/wo IV contrast once he recovers from current illness. We will continue to follow along. Thank you for the opportunity to assist in the care of this patient.                Signed By: Hany Cast Shahbaz Malone, EDWARD     January 18, 2022        I have reviewed the note and agree with the outlined plan. He has a large right renal mass that may be an RCC or kind of looks like an oncocytoma. Will schedule FU with me for next week in my office. Pt undergoing lap pili today.       Adeola Cardona MD,MPH, [address] [facility]

## 2022-01-18 NOTE — ROUTINE PROCESS
TRANSFER - OUT REPORT:    Verbal report given to Geovanni Shaffer RN on [facility]  being transferred to 2nd floor for routine progression of care       Report consisted of patients Situation, Background, Assessment and   Recommendations(SBAR). Information from the following report(s) ED Summary was reviewed with the receiving nurse. Lines:   Peripheral IV 01/17/22 Right Antecubital (Active)        Opportunity for questions and clarification was provided.       Patient transported with:

## 2022-01-18 NOTE — ED PROVIDER NOTES
Chief complaint : Abdominal pain    HISTORY OF PRESENT ILLNESS :  Location : Right-sided, more right lower quadrant and right upper quadrant    Quality : Aching    Quantity : Constant    Timing : Saturday, very 15th, steadily worsening, particularly today    Severity : Moderate    Context : Gastric bypass about a year ago    Alleviating / exacerbating factors : Pain worsens with movement activity, and changes in position    Associated Symptoms : Nausea but no vomiting, decreased appetite  Subjective fevers           Past Medical History:   Diagnosis Date    Anemia     Anxiety     No meds     Chronic pain     Depression     GERD (gastroesophageal reflux disease)     Hypercholesterolemia     Pt denies     Hypertension     Prediabetes     Pt denies     Sleep apnea     C-pap broken, does not use        Past Surgical History:   Procedure Laterality Date    HX COLONOSCOPY      HX ENDOSCOPY      HX GI      HX OTHER SURGICAL      Pt states \"bumps removed from lower back\"          Family History:   Problem Relation Age of Onset    OSTEOARTHRITIS Mother     No Known Problems Father        Social History     Socioeconomic History    Marital status:      Spouse name: Not on file    Number of children: Not on file    Years of education: Not on file    Highest education level: Not on file   Occupational History    Not on file   Tobacco Use    Smoking status: Former Smoker     Years: 4.00     Quit date: 2016     Years since quittin.6    Smokeless tobacco: Never Used   Vaping Use    Vaping Use: Never used   Substance and Sexual Activity    Alcohol use: Yes     Comment: occas    Drug use: Never    Sexual activity: Yes     Partners: Female   Other Topics Concern    Not on file   Social History Narrative    Not on file     Social Determinants of Health     Financial Resource Strain:     Difficulty of Paying Living Expenses: Not on file   Food Insecurity:     Worried About Running Out of Food in the Last Year: Not on file    Ran Out of Food in the Last Year: Not on file   Transportation Needs:     Lack of Transportation (Medical): Not on file    Lack of Transportation (Non-Medical): Not on file   Physical Activity:     Days of Exercise per Week: Not on file    Minutes of Exercise per Session: Not on file   Stress:     Feeling of Stress : Not on file   Social Connections:     Frequency of Communication with Friends and Family: Not on file    Frequency of Social Gatherings with Friends and Family: Not on file    Attends Catholic Services: Not on file    Active Member of [address] [place] [address] [employer] or Organizations: Not on file    Attends Club or Organization Meetings: Not on file    Marital Status: Not on file   Intimate Partner Violence:     Fear of Current or Ex-Partner: Not on file    Emotionally Abused: Not on file    Physically Abused: Not on file    Sexually Abused: Not on file   Housing Stability:     Unable to Pay for Housing in the Last Year: Not on file    Number of [place] in the Last Year: Not on file    Unstable Housing in the Last Year: Not on file         ALLERGIES: Patient has no known allergies. Review of Systems   Constitutional: Positive for activity change, appetite change and fever. Negative for chills. HENT: Negative for rhinorrhea and sore throat. Eyes: Negative for discharge and redness. Respiratory: Negative for cough and shortness of breath. Cardiovascular: Negative for chest pain and palpitations. Gastrointestinal: Positive for abdominal pain and nausea. Negative for diarrhea and vomiting. Genitourinary: Negative for difficulty urinating and dysuria. Musculoskeletal: Negative for arthralgias and back pain. Skin: Negative for rash. Neurological: Negative for dizziness and headaches. All other systems reviewed and are negative.       Vitals:    01/17/22 1924   BP: (!) 142/78   Pulse: (!) 107   Resp: 14   Temp: 98.3 °F (36.8 °C)   SpO2: 96%   Weight: 89.4 kg (197 lb)   Height: 5' 6\" (1.676 m)            Physical Exam  Vitals and nursing note reviewed. Constitutional:       General: He is not in acute distress. Appearance: Normal appearance. He is well-developed. He is ill-appearing. He is not toxic-appearing or diaphoretic. HENT:      Head: Normocephalic and atraumatic. Right Ear: External ear normal.      Left Ear: External ear normal.      Mouth/Throat:      Mouth: Mucous membranes are moist.      Pharynx: Oropharynx is clear. No oropharyngeal exudate or posterior oropharyngeal erythema. Eyes:      General: No scleral icterus. Right eye: No discharge. Left eye: No discharge. Extraocular Movements: Extraocular movements intact. Conjunctiva/sclera: Conjunctivae normal.      Pupils: Pupils are equal, round, and reactive to light. Neck:      Thyroid: No thyromegaly. Trachea: Trachea normal.   Cardiovascular:      Rate and Rhythm: Normal rate and regular rhythm. Heart sounds: Normal heart sounds. No murmur heard. No gallop. Pulmonary:      Effort: Pulmonary effort is normal. No respiratory distress. Breath sounds: Normal breath sounds. No wheezing or rales. Abdominal:      General: Bowel sounds are normal.      Palpations: Abdomen is soft. There is no hepatomegaly, splenomegaly or pulsatile mass. Tenderness: There is abdominal tenderness in the right upper quadrant and right lower quadrant. There is no guarding. Musculoskeletal:         General: Normal range of motion. Cervical back: Normal range of motion and neck supple. Normal range of motion. Lymphadenopathy:      Cervical: No cervical adenopathy. Skin:     General: Skin is warm and dry. Neurological:      General: No focal deficit present. Mental Status: He is alert and oriented to person, place, and time. Mental status is at baseline. Motor: No abnormal muscle tone.       Comments: cni 2-12 grossly   Psychiatric: Mood and Affect: Mood normal.         Behavior: Behavior normal.          MDM  Number of Diagnoses or Management Options  Diagnosis management comments: Medical decision making note:  Right-sided abdominal pain suspicious for appendicitis, patient is status post gastric bypass, leukocytosis noted, CT scan ordered, and then reordered with oral contrast in light of previous bariatric surgery. This concludes the \"medical decision making note\" part of this emergency department visit note.          Amount and/or Complexity of Data Reviewed  Clinical lab tests: reviewed and ordered (Results Include:    Recent Results (from the past 24 hour(s))  -CBC WITH AUTOMATED DIFF:   Collection Time: 01/17/22  7:29 PM       Result                      Value             Ref Range           WBC                         16.2 (H)          4.3 - 11.1 K*       RBC                         5.67 (H)          4.23 - 5.6 M*       HGB                         15.0              13.6 - 17.2 *       HCT                         46.2              41.1 - 50.3 %       MCV                         81.5              79.6 - 97.8 *       MCH                         26.5              26.1 - 32.9 *       MCHC                        32.5              31.4 - 35.0 *       RDW                         14.2              11.9 - 14.6 %       PLATELET                    180               150 - 450 K/*       MPV                         10.6              9.4 - 12.3 FL       ABSOLUTE NRBC               0.00              0.0 - 0.2 K/*       DF                          AUTOMATED                             NEUTROPHILS                 83 (H)            43 - 78 %           LYMPHOCYTES                 12 (L)            13 - 44 %           MONOCYTES                   4                 4.0 - 12.0 %        EOSINOPHILS                 0 (L)             0.5 - 7.8 %         BASOPHILS                   1                 0.0 - 2.0 %         IMMATURE GRANULOCYTES       0 0.0 - 5.0 %         ABS. NEUTROPHILS            13.5 (H)          1.7 - 8.2 K/*       ABS. LYMPHOCYTES            1.9               0.5 - 4.6 K/*       ABS. MONOCYTES              0.7               0.1 - 1.3 K/*       ABS. EOSINOPHILS            0.0               0.0 - 0.8 K/*       ABS. BASOPHILS              0.1               0.0 - 0.2 K/*       ABS. IMM. GRANS.            0.1               0.0 - 0.5 K/*  -METABOLIC PANEL, COMPREHENSIVE:   Collection Time: 01/17/22  7:29 PM       Result                      Value             Ref Range           Sodium                      137 (L)           138 - 145 mm*       Potassium                   3.8               3.5 - 5.1 mm*       Chloride                    106               98 - 107 mmo*       CO2                         25                21 - 32 mmol*       Anion gap                   6 (L)             7 - 16 mmol/L       Glucose                     228 (H)           65 - 100 mg/*       BUN                         12                6 - 23 MG/DL        Creatinine                  0.87              0.8 - 1.5 MG*       GFR est AA                  >60               >60 ml/min/1*       GFR est non-AA              >60               >60 ml/min/1*       Calcium                     8.3               8.3 - 10.4 M*       Bilirubin, total            0.7               0.2 - 1.1 MG*       ALT (SGPT)                  37                12 - 65 U/L         AST (SGOT)                  22                15 - 37 U/L         Alk.  phosphatase            72                50 - 136 U/L        Protein, total              6.9               6.3 - 8.2 g/*       Albumin                     3.2 (L)           3.5 - 5.0 g/*       Globulin                    3.7 (H)           2.3 - 3.5 g/*       A-G Ratio                   0.9 (L)           1.2 - 3.5      -LIPASE:   Collection Time: 01/17/22  7:29 PM       Result                      Value             Ref Range           Lipase 104               73 - 393 U/L   )  Tests in the radiology section of CPT®: ordered and reviewed  Decide to obtain previous medical records or to obtain history from someone other than the patient: yes  Obtain history from someone other than the patient: yes (Wife at the bedside)    Risk of Complications, Morbidity, and/or Mortality  Presenting problems: moderate  Diagnostic procedures: low  Management options: low    Patient Progress  Patient progress: improved         Procedures

## 2022-01-19 ENCOUNTER — APPOINTMENT (OUTPATIENT)
Dept: CT IMAGING | Age: 52
End: 2022-01-19
Attending: NURSE PRACTITIONER
Payer: COMMERCIAL

## 2022-01-19 LAB
ANION GAP SERPL CALC-SCNC: 5 MMOL/L (ref 7–16)
BUN SERPL-MCNC: 16 MG/DL (ref 6–23)
CALCIUM SERPL-MCNC: 8.6 MG/DL (ref 8.3–10.4)
CHLORIDE SERPL-SCNC: 106 MMOL/L (ref 98–107)
CO2 SERPL-SCNC: 25 MMOL/L (ref 21–32)
CREAT SERPL-MCNC: 0.73 MG/DL (ref 0.8–1.5)
GLUCOSE SERPL-MCNC: 94 MG/DL (ref 65–100)
POTASSIUM SERPL-SCNC: 4 MMOL/L (ref 3.5–5.1)
SODIUM SERPL-SCNC: 136 MMOL/L (ref 138–145)

## 2022-01-19 PROCEDURE — 74011000636 HC RX REV CODE- 636: Performed by: SURGERY

## 2022-01-19 PROCEDURE — C9113 INJ PANTOPRAZOLE SODIUM, VIA: HCPCS | Performed by: NURSE PRACTITIONER

## 2022-01-19 PROCEDURE — 80048 BASIC METABOLIC PNL TOTAL CA: CPT

## 2022-01-19 PROCEDURE — 74011000258 HC RX REV CODE- 258: Performed by: NURSE PRACTITIONER

## 2022-01-19 PROCEDURE — 74011000258 HC RX REV CODE- 258: Performed by: SURGERY

## 2022-01-19 PROCEDURE — 74011250636 HC RX REV CODE- 250/636: Performed by: SURGERY

## 2022-01-19 PROCEDURE — 2709999900 HC NON-CHARGEABLE SUPPLY

## 2022-01-19 PROCEDURE — G0378 HOSPITAL OBSERVATION PER HR: HCPCS

## 2022-01-19 PROCEDURE — 74011250636 HC RX REV CODE- 250/636: Performed by: NURSE PRACTITIONER

## 2022-01-19 PROCEDURE — 74011000250 HC RX REV CODE- 250: Performed by: NURSE PRACTITIONER

## 2022-01-19 PROCEDURE — 74170 CT ABD WO CNTRST FLWD CNTRST: CPT

## 2022-01-19 PROCEDURE — 36415 COLL VENOUS BLD VENIPUNCTURE: CPT

## 2022-01-19 RX ORDER — SODIUM CHLORIDE 0.9 % (FLUSH) 0.9 %
10 SYRINGE (ML) INJECTION
Status: COMPLETED | OUTPATIENT
Start: 2022-01-19 | End: 2022-01-19

## 2022-01-19 RX ADMIN — IOPAMIDOL 100 ML: 755 INJECTION, SOLUTION INTRAVENOUS at 10:52

## 2022-01-19 RX ADMIN — SODIUM CHLORIDE, PRESERVATIVE FREE 40 MG: 5 INJECTION INTRAVENOUS at 08:37

## 2022-01-19 RX ADMIN — PIPERACILLIN AND TAZOBACTAM 3.38 G: 3; .375 INJECTION, POWDER, LYOPHILIZED, FOR SOLUTION INTRAVENOUS at 17:49

## 2022-01-19 RX ADMIN — SODIUM CHLORIDE 100 ML: 900 INJECTION, SOLUTION INTRAVENOUS at 10:52

## 2022-01-19 RX ADMIN — SODIUM CHLORIDE 100 ML/HR: 900 INJECTION, SOLUTION INTRAVENOUS at 08:46

## 2022-01-19 RX ADMIN — PIPERACILLIN SODIUM AND TAZOBACTAM SODIUM 4.5 G: 4; .5 INJECTION, POWDER, LYOPHILIZED, FOR SOLUTION INTRAVENOUS at 10:01

## 2022-01-19 RX ADMIN — SODIUM CHLORIDE, PRESERVATIVE FREE 10 ML: 5 INJECTION INTRAVENOUS at 14:42

## 2022-01-19 RX ADMIN — PIPERACILLIN SODIUM AND TAZOBACTAM SODIUM 4.5 G: 4; .5 INJECTION, POWDER, LYOPHILIZED, FOR SOLUTION INTRAVENOUS at 02:30

## 2022-01-19 RX ADMIN — SODIUM CHLORIDE, PRESERVATIVE FREE 10 ML: 5 INJECTION INTRAVENOUS at 06:02

## 2022-01-19 RX ADMIN — Medication 10 ML: at 10:53

## 2022-01-19 NOTE — OP NOTES
Operative Report    Name: Stefanie Duke      MRN: [id number]       : 1970       Age: 45 y.o. Sex: male    Date of Surgery: 2022     Preoperative Diagnosis: CHOLECYSTITIS     Postoperative Diagnosis: CHOLECYSTITIS     Name of procedure: Procedure(s):  CHOLECYSTECTOMY LAPAROSCOPIC     Surgeon: Sammy Baugh MD     Anesthesia: General     Complications: None    Estimated Blood Loss: less than 25 cc           Specimens:   ID Type Source Tests Collected by Time Destination   1 : GALLBLADDER Fresh Gallbladder  Ashlee Michael MD 2022 Pathology       Implants:  * No implants in log *    Findings:  Acute cholecystitis - inflamed gallbladder. Right retroperitoneal fullness. No liver masses or intraperitoneal signs of metastases seen. Statement of Medical Necessity: Stefanie Duke is a 45 y.o. male who presented to the ER complaining of SAUD pain and fever. Patient had an [address] showing gallstones and acute cholecystitis. A cholecystectomy was recommended. We discussed risks, benefits and alternatives of surgery. Patient understood and agreed to proceed with laparoscopic cholecystectomy. We discussed risks of cholecystectomy including but not limited to pain, infection, bleeding, scar, conversion from laparoscopic to open, injury to organs, enterotomy, cautery injury, hernia, injury to bile ducts, biliary leak, biliary stricture, retained stone, need for additional procedures, need for cholangiography, change in bowel habits. Procedure Details   After informed consent was taken, patient was taken to the operating room and placed in supine position. Anesthesia was induced and patient was intubated. Patient received preoperative antibiotics. Abdomen was prepped and draped in standard sterile fashion. A timeout was performed with all team member present. A 1 cm horizontal incision was made in the right upper quadrant area. A Veress needle was inserted. Saline drop test was normal. The abdomen was insufflated with carbon dioxide to a pressure of 15 mmHg. The patient tolerated the insufflation well. A 5 mm trocar was inserted using an Optiview technique. A general survey was performed and no injury was observed from trocar placement. Two additional 5 mm ports were inserted in the right upper quadrant as well as a 12 mm trocar in the subxiphoid area under direct visualization. The patient was placed in reverse Trendelenberg with his right side up. The gallbladder was very distended. A decompression needle was used to empty the gallbladder. The fundus of the gallbladder was retracted cephalad with a grasper. The infundibulum of the gallbladder was exposed. The infundibulum was retracted inferiorly and laterally. Using a combination of hook cautery and blunt dissection, we cleared the triangle of Calot of peritoneum and fat both anteriorly and posteriorly. This dissection was extended laterally and cephalad on the anterior and posterior walls of the gallbladder. The cystic duct was identified at its connection with the gallbladder infundibulum and circumferentially dissected. The anterior cystic artery was identified and dissected circumferentially. A critical view was obtained of the triangle of Calot both anteriorly and posteriorly. Pictures of the critical view were taken for documentation. Both the cystic artery and cystic duct were clipped twice proximally and once distally. The cystic artery and cystic duct were divided sharply. The gallbladder was dissected off the gallbladder fossa using a hook cautery. Hemostasis was verified along the liver bed and the clips were visualized with no evidence of bile leaking or bleeding. The gallbladder was placed in an endocatch bag and removed through the 12 mm port site. The liver bed and clips were again visualized and in place, there was good hemostasis.  A Nuknit was placed in the liver bed as the liver was raw and mildly oozing at times. The 12 mm port fascia was closed with 0-Vicryl sutures in interrupted fashion using an Endoclosure device. The ports were removed and the abdomen was desufflated. The skin of all cannula insertion sites was closed with 4-0 Monocryl subcuticular sutures. Mastisol and Steri strips were applied to the skin incisions. All counts were reported as correct. The patient was awakened from anesthesia, transferred to a stretcher, and transported to the PACU in good condition.         Signed by: Ran Staples MD  [facility]ariat[facility]  1/18/2022 7:34 PM

## 2022-01-19 NOTE — ANESTHESIA POSTPROCEDURE EVALUATION
Procedure(s):  CHOLECYSTECTOMY LAPAROSCOPIC. general    Anesthesia Post Evaluation      Multimodal analgesia: multimodal analgesia used between 6 hours prior to anesthesia start to PACU discharge  Patient location during evaluation: PACU  Patient participation: complete - patient participated  Level of consciousness: awake and alert  Pain score: 0  Pain management: satisfactory to patient  Airway patency: patent  Anesthetic complications: no  Cardiovascular status: acceptable and hemodynamically stable  Respiratory status: acceptable and spontaneous ventilation  Hydration status: acceptable  Post anesthesia nausea and vomiting:  none  Final Post Anesthesia Temperature Assessment:  Normothermia (36.0-37.5 degrees C)      INITIAL Post-op Vital signs:   Vitals Value Taken Time   /60 01/18/22 1955   Temp 36.3 °C (97.4 °F) 01/18/22 1945   Pulse 81 01/18/22 1959   Resp 14 01/18/22 1945   SpO2 96 % 01/18/22 1959   Vitals shown include unvalidated device data.

## 2022-01-19 NOTE — PROGRESS NOTES
TRANSFER - IN REPORT:    Verbal report received from Alysa Muro RN(name) on [facility]  being received from PACU(unit) for routine progression of care      Report consisted of patients Situation, Background, Assessment and   Recommendations(SBAR). Information from the following report(s) SBAR, OR Summary, Procedure Summary, Intake/Output, MAR and Med Rec Status was reviewed with the receiving nurse. Opportunity for questions and clarification was provided. Assessment to be completed upon patients arrival to unit and care assumed.

## 2022-01-19 NOTE — PROGRESS NOTES
01/18/22 2110   Dual Skin Pressure Injury Assessment   Dual Skin Pressure Injury Assessment WDL   Second Care Provider (Based on [address] [place] [address]) Jimmy Madsen RN   Skin Integumentary   Skin Integumentary (WDL) X    Pressure  Injury Documentation No Pressure Injury Noted-Pressure Ulcer Prevention Initiated   Skin Color Appropriate for ethnicity   Skin Condition/Temp Dry; Warm   Skin Integrity Incision (comment)   Turgor Non-tenting   Wound Prevention and Protection Methods   Orientation of Wound Prevention Posterior   Location of Wound Prevention Sacrum/Coccyx   Dressing Present  No   Wound Offloading (Prevention Methods) Bed, pressure reduction mattress

## 2022-01-19 NOTE — PERIOP NOTES
TRANSFER - OUT REPORT:    Verbal report given to Carli DOBSON   on [employer]  being transferred to room 221 for routine post - op       Report consisted of patients Situation, Background, Assessment and   Recommendations(SBAR). Information from the following report(s) SBAR, Kardex, OR Summary, Intake/Output and MAR was reviewed with the receiving nurse. Lines:   Peripheral IV 01/17/22 Right Antecubital (Active)   Site Assessment Clean, dry, & intact 01/18/22 2025   Phlebitis Assessment 0 01/18/22 2025   Infiltration Assessment 0 01/18/22 2025   Dressing Status Clean, dry, & intact 01/18/22 2025   Dressing Type Transparent;Tape 01/18/22 2025   Hub Color/Line Status Green; Infusing 01/18/22 2025        Opportunity for questions and clarification was provided. VTE prophylaxis orders have been written for [employer]. Keegan Cox

## 2022-01-19 NOTE — PROGRESS NOTES
rounded on patient who stated that communication with staff has been ok sometimes but sometimes he didn't understand what nurses said. I communicated that we offer interpreting services at no cost and that he should request one when needed.     Thank you,            Janette Overton [facility]  [address] [facility]  [phone] (phone)

## 2022-01-19 NOTE — PROGRESS NOTES
END OF SHIFT NOTE:    INTAKE/OUTPUT  01/18 0701 - 01/19 0700  In: 1117.8 [P.O.:60; I.V.:1057.8]  Out: 350 [Urine:350]  Voiding: YES  Catheter: NO  Drain:      Flatus: Patient does not have flatus present. Stool:  0 occurrences. Emesis: 0 occurrences. VITAL SIGNS  Patient Vitals for the past 12 hrs:   Temp Pulse Resp BP SpO2   01/19/22 0341 98 °F (36.7 °C) 65 18 116/72 96 %   01/18/22 2328 97.8 °F (36.6 °C) 81 18 112/71 92 %   01/18/22 2100 98.2 °F (36.8 °C) 80 18 129/82 96 %   01/18/22 2045  87 18 121/62 93 %   01/18/22 2025 97.6 °F (36.4 °C) 80 20 126/63 93 %   01/18/22 2021  78 14 132/63 96 %   01/18/22 2016  80 18 130/63 95 %   01/18/22 2010  82 16 127/60 95 %   01/18/22 2006  81 14 130/61 95 %   01/18/22 2001  82 12 130/60 95 %   01/18/22 1956  87 16 129/60 95 %   01/18/22 1950  86 12 133/63 92 %   01/18/22 1945 97.4 °F (36.3 °C) 90 14 134/64 95 %   01/18/22 1944  92   95 %       Pain Assessment  Pain Intensity 1: 0 (01/18/22 2200)  Pain Location 1: Abdomen  Pain Intervention(s) 1: Medication (see MAR)  Patient Stated Pain Goal: 3    Ambulating  Yes    Shift report to be given to oncoming nurse at the bedside.     [address] [facility]

## 2022-01-19 NOTE — PROGRESS NOTES
END OF SHIFT NOTE:    INTAKE/OUTPUT  01/18 0701 - 01/19 0700  In: 1117.8 [P.O.:60; I.V.:1057.8]  Out: 350 [Urine:350]  Voiding: YES  Catheter: NO  Drain:              Flatus: Patient does not have flatus present. Stool:  0 occurrences. Characteristics:       Emesis: 0 occurrences. Characteristics:        VITAL SIGNS  Patient Vitals for the past 12 hrs:   Temp Pulse Resp BP SpO2   01/19/22 1517 98.8 °F (37.1 °C) 80 20 122/80 95 %   01/19/22 1150 98.4 °F (36.9 °C) 78 18 122/80 94 %   01/19/22 0714 98.4 °F (36.9 °C) 72 18 110/72 94 %       Pain Assessment  Pain Intensity 1: 0 (01/19/22 1316)  Pain Location 1: Abdomen  Pain Intervention(s) 1: Medication (see MAR)  Patient Stated Pain Goal: 0    Ambulating  Yes    Shift report given to oncoming nurse at the bedside.     Gracia Preston RN

## 2022-01-20 VITALS
SYSTOLIC BLOOD PRESSURE: 129 MMHG | TEMPERATURE: 97.7 F | HEIGHT: 66 IN | OXYGEN SATURATION: 97 % | DIASTOLIC BLOOD PRESSURE: 78 MMHG | WEIGHT: 197 LBS | BODY MASS INDEX: 31.66 KG/M2 | RESPIRATION RATE: 18 BRPM | HEART RATE: 62 BPM

## 2022-01-20 PROCEDURE — 74011250636 HC RX REV CODE- 250/636: Performed by: NURSE PRACTITIONER

## 2022-01-20 PROCEDURE — 74011000258 HC RX REV CODE- 258: Performed by: SURGERY

## 2022-01-20 PROCEDURE — G0378 HOSPITAL OBSERVATION PER HR: HCPCS

## 2022-01-20 PROCEDURE — C9113 INJ PANTOPRAZOLE SODIUM, VIA: HCPCS | Performed by: NURSE PRACTITIONER

## 2022-01-20 PROCEDURE — 74011250636 HC RX REV CODE- 250/636: Performed by: SURGERY

## 2022-01-20 PROCEDURE — 74011000250 HC RX REV CODE- 250: Performed by: NURSE PRACTITIONER

## 2022-01-20 PROCEDURE — 96376 TX/PRO/DX INJ SAME DRUG ADON: CPT

## 2022-01-20 RX ORDER — OXYCODONE AND ACETAMINOPHEN 5; 325 MG/1; MG/1
1 TABLET ORAL
Qty: 15 TABLET | Refills: 0 | Status: SHIPPED | OUTPATIENT
Start: 2022-01-20 | End: 2022-01-25

## 2022-01-20 RX ADMIN — SODIUM CHLORIDE, PRESERVATIVE FREE 40 MG: 5 INJECTION INTRAVENOUS at 08:30

## 2022-01-20 RX ADMIN — PIPERACILLIN AND TAZOBACTAM 3.38 G: 3; .375 INJECTION, POWDER, LYOPHILIZED, FOR SOLUTION INTRAVENOUS at 01:42

## 2022-01-20 NOTE — PROGRESS NOTES
END OF SHIFT NOTE:    INTAKE/OUTPUT  01/19 0701 - 01/20 0700  In: 1059.8 [I.V.:1059.8]  Out: 625 [Urine:625]  Voiding: YES  Catheter: NO  Drain:              Flatus: Patient does have flatus present. Stool:  0 occurrences. Characteristics:       Emesis: 0 occurrences. Characteristics:        VITAL SIGNS  Patient Vitals for the past 12 hrs:   Temp Pulse Resp BP SpO2   01/20/22 0349 97.7 °F (36.5 °C) 60 21 118/75 96 %   01/19/22 2320 98.3 °F (36.8 °C) 73 20 105/69 95 %   01/19/22 1944 98.6 °F (37 °C) 81 21 129/82 91 %       Pain Assessment  Pain Intensity 1: 0 (01/19/22 1945)  Pain Location 1: Abdomen  Pain Intervention(s) 1: Medication (see MAR)  Patient Stated Pain Goal: 0    Ambulating  Yes    Shift report given to oncoming nurse at the bedside.     [address] [facility]

## 2022-01-20 NOTE — PROGRESS NOTES
[address] January 20, 2022       RE: Angelito Rosenbaum      To Whom It May Concern,    This is to certify that Angelito Rosenbaum was admitted to [facility] from 1/18/2022 to 1/20/2022. He may return to work on 02/12/2021. Please feel free to contact my office if you have any questions or concerns. Thank you for your assistance in this matter.       Sincerely,  Willem Nino MD  [facility]  [phone]

## 2022-01-20 NOTE — PROGRESS NOTES
Patient will be d/c home today. Patient has no needs identified at being d/c home today. Family will transport home. Patient has met all treatment goals / milestones. CM will continue to monitor and remain available for any needs that may occur. Care Management Interventions  PCP Verified by CM:  (Dr Irma Guaman)  Mode of Transport at Discharge:  Other (see comment) (family)  Transition of Care Consult (CM Consult): Discharge Planning (Pt is insured by [employer] with pharmacy benefits.  )  Discharge Durable Medical Equipment: No  Physical Therapy Consult: No  Occupational Therapy Consult: No  Speech Therapy Consult: No  Support Systems: Spouse/Significant Other  Confirm Follow Up Transport: Family  [employer] Information Provided?: No  Discharge Location  Patient Expects to be Discharged to[de-identified] Home  Read-Only, Retired: Discharge Placement: Home

## 2022-01-20 NOTE — PROGRESS NOTES
CT renal mass:    IMPRESSION     1. An 8 cm exophytic right midpole renal mass with central necrosis  statistically most likely to represent renal cell carcinoma, but without  demonstrable abdominal metastatic disease or aggressive infiltration of adjacent  retroperitoneal structures, oncocytoma is also possible. Biopsy is recommended. The lesion would be amenable to percutaneous core needle biopsy with ultrasound  guidance, if clinically indicated.     2.  Small amount of postoperative gas and fluid in the gallbladder fossa without  biliary ductal dilatation.     3. A 5 cm left adrenal myelolipoma. Pt will need to follow up 1/24 with Dr. Paco Johnson.

## 2022-01-20 NOTE — PROGRESS NOTES
Aziza Gonzalez MD   Bariatric & Advanced Laparoscopic Surgery & Endoscopy  [address] [place] [address] Edeclaudio Miriam  Phone [phone]   Fax [phone]      Date of visit: 2022      Primary/Requesting provider: Yunier Braden MD         Name: Carlos Fischer      MRN: [id number]       : 1970       Age: 45 y.o. Sex: male        PCP: Yunier Braden MD     CC:    Chief Complaint   Patient presents with    Abdominal Pain       HPI:    Carlos Fischer is a 45 y.o. male who presents for evaluation of RUQ pain that started 3 days ago. Pain is 5/10. Pain radiates to back. Pain is better with pain medication and worse with nothing. Pain is associated with fever, chills and headaches. No nausea/vomiting. Previous abdominal surgeries - sleeve gastrectomy  Last colonoscopy - never    Blood thinners - none  Immunosuppressants - none    22: POD1: Pt awake in bed. No complaints. C/o abdominal pain when coughing or ambulating. Tolerating clear liquids. No nausea or vomiting. -flatus/-BM. Voiding without difficulty. AF, NAD. On room air with Sat 95%. 22 CT Renal Mass  IMPRESSION     1. An 8 cm exophytic right midpole renal mass with central necrosis  statistically most likely to represent renal cell carcinoma, but without  demonstrable abdominal metastatic disease or aggressive infiltration of adjacent  retroperitoneal structures, oncocytoma is also possible. Biopsy is recommended. The lesion would be amenable to percutaneous core needle biopsy with ultrasound  guidance, if clinically indicated.     2.  Small amount of postoperative gas and fluid in the gallbladder fossa without  biliary ductal dilatation.     3. A 5 cm left adrenal myelolipoma. 2022 - doing well today. Tolerating diet. Pain controlled. No nausea/vomiting.      PMH:    Past Medical History:   Diagnosis Date    Chronic pain     Depression     GERD (gastroesophageal reflux disease)     Hypertension     Sleep apnea     C-pap broken, does not use        PSH:    Past Surgical History:   Procedure Laterality Date    HX COLONOSCOPY      HX ENDOSCOPY      HX GI      HX OTHER SURGICAL      Pt states \"bumps removed from lower back\"        MEDS:    Current Facility-Administered Medications   Medication    piperacillin-tazobactam (ZOSYN) 3.375 g in 0.9% sodium chloride (MBP/ADV) 100 mL MBP    sodium chloride (NS) flush 5-40 mL    sodium chloride (NS) flush 5-40 mL    0.9% sodium chloride infusion    oxyCODONE-acetaminophen (PERCOCET) 5-325 mg per tablet 1 Tablet    pantoprazole (PROTONIX) 40 mg in 0.9% sodium chloride 10 mL injection    ondansetron (ZOFRAN) injection 4 mg    naloxone (NARCAN) injection 0.4 mg    lactated Ringers infusion    sodium chloride (NS) flush 5-10 mL       ALLERGIES:      No Known Allergies    SH:    Social History     Tobacco Use    Smoking status: Former Smoker     Years: 4.00     Quit date: 2016     Years since quittin.6    Smokeless tobacco: Never Used   Vaping Use    Vaping Use: Never used   Substance Use Topics    Alcohol use: Yes     Comment: occas    Drug use: Never       FH:    Family History   Problem Relation Age of Onset    OSTEOARTHRITIS Mother     No Known Problems Father        Review of systems:  Review of Systems   Constitutional: Positive for chills and fever. Negative for weight loss. HENT: Negative for sore throat. Eyes: Negative for discharge and redness. Respiratory: Negative for cough, hemoptysis, shortness of breath and stridor. Cardiovascular: Negative for chest pain and palpitations. Gastrointestinal: Positive for abdominal pain. Negative for blood in stool, constipation, diarrhea, heartburn, melena, nausea and vomiting. Genitourinary: Negative for dysuria and hematuria. Musculoskeletal: Negative for back pain, falls and joint pain. Skin: Negative for itching and rash. Neurological: Positive for headaches. Negative for sensory change, speech change, focal weakness and loss of consciousness. Endo/Heme/Allergies: Does not bruise/bleed easily. Psychiatric/Behavioral: Negative for memory loss. Depression:  The patient is not nervous/anxious and does not have insomnia. Physical Exam:     Visit Vitals  /78 (BP 1 Location: Left upper arm, BP Patient Position: At rest)   Pulse 62   Temp 97.7 °F (36.5 °C)   Resp 18   Ht 5' 6\" (1.676 m)   Wt 197 lb (89.4 kg)   SpO2 97%   BMI 31.80 kg/m²       General:  Well-developed, well-nourished, no distress. Psych:  Cooperative, good insight and judgement. Neuro:  Alert, oriented to person, place and time. HEENT:  Normocephalic, atraumatic. Sclera clear. Lungs:  Unlabored breathing. Symmetrical chest expansion. Chest wall:  No tenderness or deformity. Heart:  Regular rate and rhythm. No JVD. Abdomen:  Soft, appropriately ttp, incisions C/D/I  Post Op Steri Strips c/d/i  Extremities:  Extremities normal, atraumatic, no cyanosis or edema. Skin:  Skin color, texture, turgor normal. No rashes. Labs: All recent labs were reviewed. Recent Results (from the past 24 hour(s))   METABOLIC PANEL, BASIC    Collection Time: 01/19/22  8:44 AM   Result Value Ref Range    Sodium 136 (L) 138 - 145 mmol/L    Potassium 4.0 3.5 - 5.1 mmol/L    Chloride 106 98 - 107 mmol/L    CO2 25 21 - 32 mmol/L    Anion gap 5 (L) 7 - 16 mmol/L    Glucose 94 65 - 100 mg/dL    BUN 16 6 - 23 MG/DL    Creatinine 0.73 (L) 0.8 - 1.5 MG/DL    GFR est AA >60 >60 ml/min/1.73m2    GFR est non-AA >60 >60 ml/min/1.73m2    Calcium 8.6 8.3 - 10.4 MG/DL       Imaging: CT images were independently reviewed by me. Inflamed gallbladder concerning for cholecystitis. Large right kidney mass concerning for RCC    CT ABD PELV W CONT    Result Date: 1/18/2022  1.  Gallbladder wall thickening/edema, pericholecystic fat stranding and suggestion of large noncalcified gallstone. Findings concerning for acute cholecystitis. Consider follow-up gallbladder ultrasound to confirm. 2. Large 7.5 cm heterogeneously enhancing mass originating from midportion of the right kidney, with central cystic component. Findings presumed renal cell carcinoma until proven otherwise. No evidence of renal vein invasion. Urology referral recommended. 3. A 5 cm left adrenal myelolipoma. DC4     US ABD LTD    Result Date: 1/18/2022  1. Cholelithiasis and gallbladder wall thickening/edema. Ultrasound findings, in conjunction with findings on the earlier CT study, compatible with acute cholecystitis. 2. Negative for biliary ductal dilatation. 3. Nodular contour of the liver, suggesting cirrhosis. 4. Large soft tissue mass in the right kidney, corresponding to the abnormality noted on the earlier CT, presumed renal cell carcinoma until proven otherwise. Urology referral recommended. DC4     CT ABD RENAL MASS    Result Date: 1/19/2022  1. An 8 cm exophytic right midpole renal mass with central necrosis statistically most likely to represent renal cell carcinoma, but without demonstrable abdominal metastatic disease or aggressive infiltration of adjacent retroperitoneal structures, oncocytoma is also possible. Biopsy is recommended. The lesion would be amenable to percutaneous core needle biopsy with ultrasound guidance, if clinically indicated. 2.  Small amount of postoperative gas and fluid in the gallbladder fossa without biliary ductal dilatation. 3.  A 5 cm left adrenal myelolipoma. Active Hospital Problems    Diagnosis Date Noted    Acute cholecystitis 01/18/2022       Assessment/Plan:  Dangelo Calzada is a 45 y.o. male who has signs and symptoms consistent with acute cholecystitis    We discussed the gallbladder disease pathophysiology and patient verbalized understanding. I recommend a laparoscopic, possible open, cholecystectomy.  I discussed risks, benefits and alternatives of surgery. Patient understood and agreed to proceed with cholecystectomy. We discussed risks of cholecystectomy including but not limited to pain, infection, bleeding, scar, conversion from laparoscopic to open, injury to organs, enterotomy, cautery injury, hernia, injury to bile ducts, biliary leak, biliary stricture, retained stone, need for additional procedures, need for cholangiography, change in bowel habits. Plan:  ADAT to bariatric regular diet  Protonix  Pain/ nausea control  SCD  OOB/ Up Ad Kristen  Urology Following  --CT Renal Mass Protocol 1/19/22  --F/u in office with Dr. Jensen Shaw 1 week.   Home today  FU with me in 2 weeks       Marisa Quinonez MD  Bariatric & Minimally Invasive Surgery  [facility]  1/20/2022 8:23 AM

## 2022-01-20 NOTE — PROGRESS NOTES
Deon Moreira MD   Bariatric & Advanced Laparoscopic Surgery & Endoscopy  [address] [place], [place] [address] [address]  Phone [phone]   Fax [phone]      Date of visit: 2022      Primary/Requesting provider: Deep Quinn MD         Name: Cari Mcdermott      MRN: [id number]       : 1970       Age: 45 y.o. Sex: male        PCP: Deep Quinn MD     CC:    Chief Complaint   Patient presents with    Abdominal Pain       HPI:    Cari Mcdermott is a 45 y.o. male who presents for evaluation of RUQ pain that started 3 days ago. Pain is 5/10. Pain radiates to back. Pain is better with pain medication and worse with nothing. Pain is associated with fever, chills and headaches. No nausea/vomiting. Previous abdominal surgeries - sleeve gastrectomy  Last colonoscopy - never    Blood thinners - none  Immunosuppressants - none    22: POD1: Pt awake in bed. No complaints. C/o abdominal pain when coughing or ambulating. Tolerating clear liquids. No nausea or vomiting. -flatus/-BM. Voiding without difficulty. AF, NAD. On room air with Sat 95%. 22 CT Renal Mass  IMPRESSION     1. An 8 cm exophytic right midpole renal mass with central necrosis  statistically most likely to represent renal cell carcinoma, but without  demonstrable abdominal metastatic disease or aggressive infiltration of adjacent  retroperitoneal structures, oncocytoma is also possible. Biopsy is recommended. The lesion would be amenable to percutaneous core needle biopsy with ultrasound  guidance, if clinically indicated.     2.  Small amount of postoperative gas and fluid in the gallbladder fossa without  biliary ductal dilatation.     3. A 5 cm left adrenal myelolipoma.     PMH:    Past Medical History:   Diagnosis Date    Chronic pain     Depression     GERD (gastroesophageal reflux disease)     Hypertension     Sleep apnea     C-pap broken, does not use PSH:    Past Surgical History:   Procedure Laterality Date    HX COLONOSCOPY      HX ENDOSCOPY      HX GI      HX OTHER SURGICAL      Pt states \"bumps removed from lower back\"        MEDS:    Current Facility-Administered Medications   Medication    piperacillin-tazobactam (ZOSYN) 3.375 g in 0.9% sodium chloride (MBP/ADV) 100 mL MBP    sodium chloride (NS) flush 5-40 mL    sodium chloride (NS) flush 5-40 mL    0.9% sodium chloride infusion    oxyCODONE-acetaminophen (PERCOCET) 5-325 mg per tablet 1 Tablet    pantoprazole (PROTONIX) 40 mg in 0.9% sodium chloride 10 mL injection    ondansetron (ZOFRAN) injection 4 mg    naloxone (NARCAN) injection 0.4 mg    lactated Ringers infusion    sodium chloride (NS) flush 5-10 mL       ALLERGIES:      No Known Allergies    SH:    Social History     Tobacco Use    Smoking status: Former Smoker     Years: 4.00     Quit date: 2016     Years since quittin.6    Smokeless tobacco: Never Used   Vaping Use    Vaping Use: Never used   Substance Use Topics    Alcohol use: Yes     Comment: occas    Drug use: Never       FH:    Family History   Problem Relation Age of Onset    OSTEOARTHRITIS Mother     No Known Problems Father        Review of systems:  Review of Systems   Constitutional: Positive for chills and fever. Negative for weight loss. HENT: Negative for sore throat. Eyes: Negative for discharge and redness. Respiratory: Negative for cough, hemoptysis, shortness of breath and stridor. Cardiovascular: Negative for chest pain and palpitations. Gastrointestinal: Positive for abdominal pain. Negative for blood in stool, constipation, diarrhea, heartburn, melena, nausea and vomiting. Genitourinary: Negative for dysuria and hematuria. Musculoskeletal: Negative for back pain, falls and joint pain. Skin: Negative for itching and rash. Neurological: Positive for headaches.  Negative for sensory change, speech change, focal weakness and loss of consciousness. Endo/Heme/Allergies: Does not bruise/bleed easily. Psychiatric/Behavioral: Negative for memory loss. The patient is not nervous/anxious and does not have insomnia. Physical Exam:     Visit Vitals  /80   Pulse 80   Temp 98.8 °F (37.1 °C)   Resp 20   Ht 5' 6\" (1.676 m)   Wt 197 lb (89.4 kg)   SpO2 95%   BMI 31.80 kg/m²       General:  Well-developed, well-nourished, no distress. Psych:  Cooperative, good insight and judgement. Neuro:  Alert, oriented to person, place and time. HEENT:  Normocephalic, atraumatic. Sclera clear. Lungs:  Unlabored breathing. Symmetrical chest expansion. Chest wall:  No tenderness or deformity. Heart:  Regular rate and rhythm. No JVD. Abdomen:  Soft, non-distended. Post Op Steri Strips c/d/i  Extremities:  Extremities normal, atraumatic, no cyanosis or edema. Skin:  Skin color, texture, turgor normal. No rashes. Labs: All recent labs were reviewed. Recent Results (from the past 24 hour(s))   METABOLIC PANEL, BASIC    Collection Time: 01/19/22  8:44 AM   Result Value Ref Range    Sodium 136 (L) 138 - 145 mmol/L    Potassium 4.0 3.5 - 5.1 mmol/L    Chloride 106 98 - 107 mmol/L    CO2 25 21 - 32 mmol/L    Anion gap 5 (L) 7 - 16 mmol/L    Glucose 94 65 - 100 mg/dL    BUN 16 6 - 23 MG/DL    Creatinine 0.73 (L) 0.8 - 1.5 MG/DL    GFR est AA >60 >60 ml/min/1.73m2    GFR est non-AA >60 >60 ml/min/1.73m2    Calcium 8.6 8.3 - 10.4 MG/DL       Imaging: CT images were independently reviewed by me. Inflamed gallbladder concerning for cholecystitis. Large right kidney mass concerning for RCC    CT ABD PELV W CONT    Result Date: 1/18/2022  1. Gallbladder wall thickening/edema, pericholecystic fat stranding and suggestion of large noncalcified gallstone. Findings concerning for acute cholecystitis. Consider follow-up gallbladder ultrasound to confirm.  2. Large 7.5 cm heterogeneously enhancing mass originating from midportion of the right kidney, with central cystic component. Findings presumed renal cell carcinoma until proven otherwise. No evidence of renal vein invasion. Urology referral recommended. 3. A 5 cm left adrenal myelolipoma. DC4     US ABD LTD    Result Date: 1/18/2022  1. Cholelithiasis and gallbladder wall thickening/edema. Ultrasound findings, in conjunction with findings on the earlier CT study, compatible with acute cholecystitis. 2. Negative for biliary ductal dilatation. 3. Nodular contour of the liver, suggesting cirrhosis. 4. Large soft tissue mass in the right kidney, corresponding to the abnormality noted on the earlier CT, presumed renal cell carcinoma until proven otherwise. Urology referral recommended. DC4     CT ABD RENAL MASS    Result Date: 1/19/2022  1. An 8 cm exophytic right midpole renal mass with central necrosis statistically most likely to represent renal cell carcinoma, but without demonstrable abdominal metastatic disease or aggressive infiltration of adjacent retroperitoneal structures, oncocytoma is also possible. Biopsy is recommended. The lesion would be amenable to percutaneous core needle biopsy with ultrasound guidance, if clinically indicated. 2.  Small amount of postoperative gas and fluid in the gallbladder fossa without biliary ductal dilatation. 3.  A 5 cm left adrenal myelolipoma. Active Hospital Problems    Diagnosis Date Noted    Acute cholecystitis 01/18/2022       Assessment/Plan:  Derek Smith is a 45 y.o. male who has signs and symptoms consistent with acute cholecystitis    We discussed the gallbladder disease pathophysiology and patient verbalized understanding. I recommend a laparoscopic, possible open, cholecystectomy. I discussed risks, benefits and alternatives of surgery. Patient understood and agreed to proceed with cholecystectomy.    We discussed risks of cholecystectomy including but not limited to pain, infection, bleeding, scar, conversion from laparoscopic to open, injury to organs, enterotomy, cautery injury, hernia, injury to bile ducts, biliary leak, biliary stricture, retained stone, need for additional procedures, need for cholangiography, change in bowel habits. Plan:  CLD   Protonix  Zosyn  IVF  Pain/ nausea control  SCD  OOB/ Up Ad Kristen  Urology Following  --CT Renal Mass Protocol 1/19/22  --F/u in office with Dr. Armando Baer 1 week.   DC home in am  F/u with Dr. Perez Kelly in 10-14 days        Chi Romero, NP

## 2022-02-03 ENCOUNTER — HOSPITAL ENCOUNTER (OUTPATIENT)
Dept: CT IMAGING | Age: 52
Discharge: HOME | End: 2022-02-03
Attending: UROLOGY
Payer: COMMERCIAL

## 2022-02-03 DIAGNOSIS — N28.89 RENAL MASS, RIGHT: ICD-10-CM

## 2022-02-03 PROCEDURE — 71260 CT THORAX DX C+: CPT

## 2022-02-03 PROCEDURE — 74011000258 HC RX REV CODE- 258: Performed by: UROLOGY

## 2022-02-03 PROCEDURE — 74011000636 HC RX REV CODE- 636: Performed by: UROLOGY

## 2022-02-03 RX ORDER — SODIUM CHLORIDE 0.9 % (FLUSH) 0.9 %
10 SYRINGE (ML) INJECTION
Status: COMPLETED | OUTPATIENT
Start: 2022-02-03 | End: 2022-02-03

## 2022-02-03 RX ADMIN — SODIUM CHLORIDE 100 ML: 9 INJECTION, SOLUTION INTRAVENOUS at 11:11

## 2022-02-03 RX ADMIN — Medication 10 ML: at 11:12

## 2022-02-03 RX ADMIN — IOPAMIDOL 100 ML: 755 INJECTION, SOLUTION INTRAVENOUS at 11:11

## 2022-02-08 ENCOUNTER — HOSPITAL ENCOUNTER (OUTPATIENT)
Dept: SURGERY | Age: 52
Discharge: HOME | End: 2022-02-08
Attending: UROLOGY

## 2022-02-08 NOTE — PERIOP NOTES
Pre assessment walk in  assessment completed with use AMN  # [id number]   Patient verified name and     Order for consent was found in EHR and matches case posting; patient verified. Type 3 surgery,     Labs per surgeon: noted in EHR;   Labs per anesthesia protocol:cbc,cmp results are in connect care from 2022 results wdl of anesthesia protocol  EKG: in [facility] from 2021. Patient denies any chest pain or GUPTA    Patient COVID test scheduled for 2022. The testing center is located at the [address], [place]. If appointment is needed patient provided telephone number of [phone]. Information provided with use of [facility] approved surgical skin cleanser and instructions given per hospital policy. Patient provided with and instructed on educational handouts including Guide to Surgery, Pain Management, Hand Hygiene, Blood Transfusion Education, and [facility] Brochure. Reviewed patient PTA medicine list. Patient states \"only medication take is pain pill\" and unable to name this medication. Patient answered medical/surgical history questions at their best of ability. All prior to admission medications documented in [facility]. Original medication prescription bottle not visualized during patient appointment. Patient instructed to hold all vitamins 7 days prior to surgery and NSAIDS 5 days prior to surgery, patient verbalized understanding. Instructed not to eat or drink after midnight may take pain medication with sip of water    Patient teach back successful and patient demonstrates knowledge of instructions.

## 2022-02-14 ENCOUNTER — ANESTHESIA EVENT (OUTPATIENT)
Dept: SURGERY | Age: 52
DRG: 660 | End: 2022-02-14
Payer: COMMERCIAL

## 2022-02-15 ENCOUNTER — ANESTHESIA (OUTPATIENT)
Dept: SURGERY | Age: 52
DRG: 660 | End: 2022-02-15
Payer: COMMERCIAL

## 2022-02-15 ENCOUNTER — HOSPITAL ENCOUNTER (INPATIENT)
Age: 52
LOS: 2 days | Discharge: HOME | DRG: 660 | End: 2022-02-17
Attending: UROLOGY | Admitting: UROLOGY
Payer: COMMERCIAL

## 2022-02-15 DIAGNOSIS — Z90.5 S/P NEPHRECTOMY: Primary | ICD-10-CM

## 2022-02-15 DIAGNOSIS — N28.89 RENAL MASS, RIGHT: ICD-10-CM

## 2022-02-15 LAB — HISTORY CHECKED?,CKHIST: NORMAL

## 2022-02-15 PROCEDURE — 77030012406 HC DRN WND PENRS BARD -A: Performed by: UROLOGY

## 2022-02-15 PROCEDURE — 88307 TISSUE EXAM BY PATHOLOGIST: CPT

## 2022-02-15 PROCEDURE — 77030040506 HC DRN WND MDII -A: Performed by: UROLOGY

## 2022-02-15 PROCEDURE — 2709999900 HC NON-CHARGEABLE SUPPLY: Performed by: UROLOGY

## 2022-02-15 PROCEDURE — 77030008542 HC TBNG MON PRSS EDWD -A: Performed by: ANESTHESIOLOGY

## 2022-02-15 PROCEDURE — 74011250636 HC RX REV CODE- 250/636: Performed by: REGISTERED NURSE

## 2022-02-15 PROCEDURE — 74011250637 HC RX REV CODE- 250/637: Performed by: ANESTHESIOLOGY

## 2022-02-15 PROCEDURE — 88305 TISSUE EXAM BY PATHOLOGIST: CPT

## 2022-02-15 PROCEDURE — 86920 COMPATIBILITY TEST SPIN: CPT

## 2022-02-15 PROCEDURE — 77030039425 HC BLD LARYNG TRULITE DISP TELE -A: Performed by: ANESTHESIOLOGY

## 2022-02-15 PROCEDURE — 74011000250 HC RX REV CODE- 250: Performed by: UROLOGY

## 2022-02-15 PROCEDURE — 74011250636 HC RX REV CODE- 250/636: Performed by: ANESTHESIOLOGY

## 2022-02-15 PROCEDURE — 76060000036 HC ANESTHESIA 2.5 TO 3 HR: Performed by: UROLOGY

## 2022-02-15 PROCEDURE — 77030013567 HC DRN WND RESERV BARD -A: Performed by: UROLOGY

## 2022-02-15 PROCEDURE — 76998 US GUIDE INTRAOP: CPT | Performed by: UROLOGY

## 2022-02-15 PROCEDURE — 77030005401 HC CATH RAD ARRO -A: Performed by: ANESTHESIOLOGY

## 2022-02-15 PROCEDURE — 77030003602 HC NDL NRV BLK BBMI -B: Performed by: ANESTHESIOLOGY

## 2022-02-15 PROCEDURE — 77030037088 HC TUBE ENDOTRACH ORAL NSL COVD-A: Performed by: ANESTHESIOLOGY

## 2022-02-15 PROCEDURE — 76210000006 HC OR PH I REC 0.5 TO 1 HR: Performed by: UROLOGY

## 2022-02-15 PROCEDURE — 77030031139 HC SUT VCRL2 J&J -A: Performed by: UROLOGY

## 2022-02-15 PROCEDURE — 77030040922 HC BLNKT HYPOTHRM STRY -A: Performed by: ANESTHESIOLOGY

## 2022-02-15 PROCEDURE — 74011250637 HC RX REV CODE- 250/637: Performed by: UROLOGY

## 2022-02-15 PROCEDURE — 77030002896 HC SUT CLP ABSRB J&J -B: Performed by: UROLOGY

## 2022-02-15 PROCEDURE — 74011250636 HC RX REV CODE- 250/636: Performed by: UROLOGY

## 2022-02-15 PROCEDURE — 77030019908 HC STETH ESOPH SIMS -A: Performed by: ANESTHESIOLOGY

## 2022-02-15 PROCEDURE — 77030013794 HC KT TRNSDUC BLD EDWD -B: Performed by: ANESTHESIOLOGY

## 2022-02-15 PROCEDURE — 77030020782 HC GWN BAIR PAWS FLX 3M -B: Performed by: ANESTHESIOLOGY

## 2022-02-15 PROCEDURE — 86900 BLOOD TYPING SEROLOGIC ABO: CPT

## 2022-02-15 PROCEDURE — 76010000172 HC OR TIME 2.5 TO 3 HR INTENSV-TIER 1: Performed by: UROLOGY

## 2022-02-15 PROCEDURE — 74011000250 HC RX REV CODE- 250: Performed by: ANESTHESIOLOGY

## 2022-02-15 PROCEDURE — 74011000250 HC RX REV CODE- 250: Performed by: REGISTERED NURSE

## 2022-02-15 PROCEDURE — 2709999900 HC NON-CHARGEABLE SUPPLY

## 2022-02-15 PROCEDURE — 65270000029 HC RM PRIVATE

## 2022-02-15 PROCEDURE — 0TB00ZZ EXCISION OF RIGHT KIDNEY, OPEN APPROACH: ICD-10-PCS | Performed by: UROLOGY

## 2022-02-15 PROCEDURE — 50240 NEPHRECTOMY PARTIAL: CPT | Performed by: UROLOGY

## 2022-02-15 RX ORDER — OXYCODONE HYDROCHLORIDE 5 MG/1
5 TABLET ORAL
Status: DISCONTINUED | OUTPATIENT
Start: 2022-02-15 | End: 2022-02-15 | Stop reason: HOSPADM

## 2022-02-15 RX ORDER — MIDAZOLAM HYDROCHLORIDE 1 MG/ML
2 INJECTION, SOLUTION INTRAMUSCULAR; INTRAVENOUS
Status: DISCONTINUED | OUTPATIENT
Start: 2022-02-15 | End: 2022-02-15 | Stop reason: HOSPADM

## 2022-02-15 RX ORDER — ACETAMINOPHEN 500 MG
1000 TABLET ORAL ONCE
Status: COMPLETED | OUTPATIENT
Start: 2022-02-15 | End: 2022-02-15

## 2022-02-15 RX ORDER — LIDOCAINE HYDROCHLORIDE 20 MG/ML
INJECTION, SOLUTION EPIDURAL; INFILTRATION; INTRACAUDAL; PERINEURAL AS NEEDED
Status: DISCONTINUED | OUTPATIENT
Start: 2022-02-15 | End: 2022-02-15 | Stop reason: HOSPADM

## 2022-02-15 RX ORDER — OXYCODONE AND ACETAMINOPHEN 5; 325 MG/1; MG/1
TABLET ORAL
COMMUNITY
End: 2022-02-17

## 2022-02-15 RX ORDER — FENTANYL CITRATE 50 UG/ML
INJECTION, SOLUTION INTRAMUSCULAR; INTRAVENOUS AS NEEDED
Status: DISCONTINUED | OUTPATIENT
Start: 2022-02-15 | End: 2022-02-15 | Stop reason: HOSPADM

## 2022-02-15 RX ORDER — SODIUM CHLORIDE, SODIUM LACTATE, POTASSIUM CHLORIDE, CALCIUM CHLORIDE 600; 310; 30; 20 MG/100ML; MG/100ML; MG/100ML; MG/100ML
25 INJECTION, SOLUTION INTRAVENOUS CONTINUOUS
Status: DISCONTINUED | OUTPATIENT
Start: 2022-02-15 | End: 2022-02-15 | Stop reason: HOSPADM

## 2022-02-15 RX ORDER — HYDROMORPHONE HYDROCHLORIDE 2 MG/ML
0.5 INJECTION, SOLUTION INTRAMUSCULAR; INTRAVENOUS; SUBCUTANEOUS
Status: DISCONTINUED | OUTPATIENT
Start: 2022-02-15 | End: 2022-02-15 | Stop reason: HOSPADM

## 2022-02-15 RX ORDER — ONDANSETRON 2 MG/ML
INJECTION INTRAMUSCULAR; INTRAVENOUS AS NEEDED
Status: DISCONTINUED | OUTPATIENT
Start: 2022-02-15 | End: 2022-02-15 | Stop reason: HOSPADM

## 2022-02-15 RX ORDER — GLYCOPYRROLATE 0.2 MG/ML
INJECTION INTRAMUSCULAR; INTRAVENOUS AS NEEDED
Status: DISCONTINUED | OUTPATIENT
Start: 2022-02-15 | End: 2022-02-15 | Stop reason: HOSPADM

## 2022-02-15 RX ORDER — HYDROMORPHONE HYDROCHLORIDE 1 MG/ML
1 INJECTION, SOLUTION INTRAMUSCULAR; INTRAVENOUS; SUBCUTANEOUS
Status: DISCONTINUED | OUTPATIENT
Start: 2022-02-15 | End: 2022-02-17 | Stop reason: HOSPADM

## 2022-02-15 RX ORDER — DEXAMETHASONE SODIUM PHOSPHATE 4 MG/ML
INJECTION, SOLUTION INTRA-ARTICULAR; INTRALESIONAL; INTRAMUSCULAR; INTRAVENOUS; SOFT TISSUE
Status: DISCONTINUED | OUTPATIENT
Start: 2022-02-15 | End: 2022-02-15 | Stop reason: HOSPADM

## 2022-02-15 RX ORDER — OXYBUTYNIN CHLORIDE 10 MG/1
10 TABLET, EXTENDED RELEASE ORAL
Status: DISCONTINUED | OUTPATIENT
Start: 2022-02-15 | End: 2022-02-17 | Stop reason: HOSPADM

## 2022-02-15 RX ORDER — NALOXONE HYDROCHLORIDE 0.4 MG/ML
0.4 INJECTION, SOLUTION INTRAMUSCULAR; INTRAVENOUS; SUBCUTANEOUS AS NEEDED
Status: DISCONTINUED | OUTPATIENT
Start: 2022-02-15 | End: 2022-02-17 | Stop reason: HOSPADM

## 2022-02-15 RX ORDER — ONDANSETRON 2 MG/ML
4 INJECTION INTRAMUSCULAR; INTRAVENOUS
Status: DISCONTINUED | OUTPATIENT
Start: 2022-02-15 | End: 2022-02-15 | Stop reason: HOSPADM

## 2022-02-15 RX ORDER — AMOXICILLIN 250 MG
1 CAPSULE ORAL 2 TIMES DAILY
Status: DISCONTINUED | OUTPATIENT
Start: 2022-02-15 | End: 2022-02-17 | Stop reason: HOSPADM

## 2022-02-15 RX ORDER — LIDOCAINE HYDROCHLORIDE 10 MG/ML
0.1 INJECTION INFILTRATION; PERINEURAL AS NEEDED
Status: DISCONTINUED | OUTPATIENT
Start: 2022-02-15 | End: 2022-02-15 | Stop reason: HOSPADM

## 2022-02-15 RX ORDER — SODIUM CHLORIDE 0.9 % (FLUSH) 0.9 %
5-40 SYRINGE (ML) INJECTION AS NEEDED
Status: DISCONTINUED | OUTPATIENT
Start: 2022-02-15 | End: 2022-02-15 | Stop reason: HOSPADM

## 2022-02-15 RX ORDER — ALBUTEROL SULFATE 0.83 MG/ML
2.5 SOLUTION RESPIRATORY (INHALATION) AS NEEDED
Status: DISCONTINUED | OUTPATIENT
Start: 2022-02-15 | End: 2022-02-15 | Stop reason: HOSPADM

## 2022-02-15 RX ORDER — SODIUM CHLORIDE 0.9 % (FLUSH) 0.9 %
5-40 SYRINGE (ML) INJECTION EVERY 8 HOURS
Status: DISCONTINUED | OUTPATIENT
Start: 2022-02-15 | End: 2022-02-17 | Stop reason: HOSPADM

## 2022-02-15 RX ORDER — HYDROCODONE BITARTRATE AND ACETAMINOPHEN 5; 325 MG/1; MG/1
1 TABLET ORAL
Status: DISCONTINUED | OUTPATIENT
Start: 2022-02-15 | End: 2022-02-17 | Stop reason: HOSPADM

## 2022-02-15 RX ORDER — SODIUM CHLORIDE 9 MG/ML
INJECTION, SOLUTION INTRAVENOUS
Status: DISCONTINUED | OUTPATIENT
Start: 2022-02-15 | End: 2022-02-15 | Stop reason: HOSPADM

## 2022-02-15 RX ORDER — ESMOLOL HYDROCHLORIDE 10 MG/ML
INJECTION INTRAVENOUS AS NEEDED
Status: DISCONTINUED | OUTPATIENT
Start: 2022-02-15 | End: 2022-02-15 | Stop reason: HOSPADM

## 2022-02-15 RX ORDER — SCOLOPAMINE TRANSDERMAL SYSTEM 1 MG/1
1 PATCH, EXTENDED RELEASE TRANSDERMAL ONCE
Status: DISCONTINUED | OUTPATIENT
Start: 2022-02-15 | End: 2022-02-17 | Stop reason: HOSPADM

## 2022-02-15 RX ORDER — ONDANSETRON 2 MG/ML
4 INJECTION INTRAMUSCULAR; INTRAVENOUS
Status: DISCONTINUED | OUTPATIENT
Start: 2022-02-15 | End: 2022-02-17 | Stop reason: HOSPADM

## 2022-02-15 RX ORDER — EPHEDRINE SULFATE/0.9% NACL/PF 50 MG/5 ML
SYRINGE (ML) INTRAVENOUS AS NEEDED
Status: DISCONTINUED | OUTPATIENT
Start: 2022-02-15 | End: 2022-02-15 | Stop reason: HOSPADM

## 2022-02-15 RX ORDER — HYDROMORPHONE HYDROCHLORIDE 2 MG/ML
INJECTION, SOLUTION INTRAMUSCULAR; INTRAVENOUS; SUBCUTANEOUS AS NEEDED
Status: DISCONTINUED | OUTPATIENT
Start: 2022-02-15 | End: 2022-02-15 | Stop reason: HOSPADM

## 2022-02-15 RX ORDER — SODIUM CHLORIDE 0.9 % (FLUSH) 0.9 %
5-40 SYRINGE (ML) INJECTION AS NEEDED
Status: DISCONTINUED | OUTPATIENT
Start: 2022-02-15 | End: 2022-02-17 | Stop reason: HOSPADM

## 2022-02-15 RX ORDER — ACETAMINOPHEN 325 MG/1
650 TABLET ORAL
Status: DISCONTINUED | OUTPATIENT
Start: 2022-02-15 | End: 2022-02-17 | Stop reason: HOSPADM

## 2022-02-15 RX ORDER — BUPIVACAINE HYDROCHLORIDE 2.5 MG/ML
INJECTION, SOLUTION EPIDURAL; INFILTRATION; INTRACAUDAL
Status: DISCONTINUED | OUTPATIENT
Start: 2022-02-15 | End: 2022-02-15 | Stop reason: HOSPADM

## 2022-02-15 RX ORDER — ROCURONIUM BROMIDE 10 MG/ML
INJECTION, SOLUTION INTRAVENOUS AS NEEDED
Status: DISCONTINUED | OUTPATIENT
Start: 2022-02-15 | End: 2022-02-15 | Stop reason: HOSPADM

## 2022-02-15 RX ORDER — DEXTROSE MONOHYDRATE AND SODIUM CHLORIDE 5; .45 G/100ML; G/100ML
75 INJECTION, SOLUTION INTRAVENOUS CONTINUOUS
Status: DISCONTINUED | OUTPATIENT
Start: 2022-02-15 | End: 2022-02-16

## 2022-02-15 RX ORDER — CEFAZOLIN SODIUM/WATER 2 G/20 ML
2 SYRINGE (ML) INTRAVENOUS ONCE
Status: COMPLETED | OUTPATIENT
Start: 2022-02-15 | End: 2022-02-15

## 2022-02-15 RX ORDER — NEOSTIGMINE METHYLSULFATE 1 MG/ML
INJECTION, SOLUTION INTRAVENOUS AS NEEDED
Status: DISCONTINUED | OUTPATIENT
Start: 2022-02-15 | End: 2022-02-15 | Stop reason: HOSPADM

## 2022-02-15 RX ORDER — SODIUM CHLORIDE 9 MG/ML
INJECTION, SOLUTION INTRAVENOUS
Status: DISCONTINUED | OUTPATIENT
Start: 2022-02-15 | End: 2022-02-15

## 2022-02-15 RX ORDER — SODIUM CHLORIDE 0.9 % (FLUSH) 0.9 %
5-40 SYRINGE (ML) INJECTION EVERY 8 HOURS
Status: DISCONTINUED | OUTPATIENT
Start: 2022-02-15 | End: 2022-02-15 | Stop reason: HOSPADM

## 2022-02-15 RX ORDER — DIPHENHYDRAMINE HYDROCHLORIDE 50 MG/ML
12.5 INJECTION, SOLUTION INTRAMUSCULAR; INTRAVENOUS
Status: DISCONTINUED | OUTPATIENT
Start: 2022-02-15 | End: 2022-02-17 | Stop reason: HOSPADM

## 2022-02-15 RX ORDER — PROPOFOL 10 MG/ML
INJECTION, EMULSION INTRAVENOUS AS NEEDED
Status: DISCONTINUED | OUTPATIENT
Start: 2022-02-15 | End: 2022-02-15 | Stop reason: HOSPADM

## 2022-02-15 RX ADMIN — HYDROCODONE BITARTRATE AND ACETAMINOPHEN 1 TABLET: 5; 325 TABLET ORAL at 19:53

## 2022-02-15 RX ADMIN — LIDOCAINE HYDROCHLORIDE 100 MG: 20 INJECTION, SOLUTION EPIDURAL; INFILTRATION; INTRACAUDAL; PERINEURAL at 11:30

## 2022-02-15 RX ADMIN — ROCURONIUM BROMIDE 20 MG: 10 INJECTION, SOLUTION INTRAVENOUS at 12:16

## 2022-02-15 RX ADMIN — HYDROMORPHONE HYDROCHLORIDE 0.5 MG: 2 INJECTION INTRAMUSCULAR; INTRAVENOUS; SUBCUTANEOUS at 15:00

## 2022-02-15 RX ADMIN — SODIUM CHLORIDE, SODIUM LACTATE, POTASSIUM CHLORIDE, AND CALCIUM CHLORIDE 25 ML/HR: 600; 310; 30; 20 INJECTION, SOLUTION INTRAVENOUS at 10:21

## 2022-02-15 RX ADMIN — FENTANYL CITRATE 100 MCG: 50 INJECTION INTRAMUSCULAR; INTRAVENOUS at 11:30

## 2022-02-15 RX ADMIN — Medication 2 G: at 11:40

## 2022-02-15 RX ADMIN — Medication 5 MG: at 13:38

## 2022-02-15 RX ADMIN — GLYCOPYRROLATE 0.8 MG: 0.2 INJECTION, SOLUTION INTRAMUSCULAR; INTRAVENOUS at 13:55

## 2022-02-15 RX ADMIN — HYDROMORPHONE HYDROCHLORIDE 0.5 MG: 2 INJECTION INTRAMUSCULAR; INTRAVENOUS; SUBCUTANEOUS at 14:56

## 2022-02-15 RX ADMIN — ROCURONIUM BROMIDE 10 MG: 10 INJECTION, SOLUTION INTRAVENOUS at 12:37

## 2022-02-15 RX ADMIN — SENNOSIDES AND DOCUSATE SODIUM 1 TABLET: 8.6; 5 TABLET ORAL at 19:53

## 2022-02-15 RX ADMIN — HYDROMORPHONE HYDROCHLORIDE 0.4 MG: 2 INJECTION INTRAMUSCULAR; INTRAVENOUS; SUBCUTANEOUS at 13:12

## 2022-02-15 RX ADMIN — Medication 5 MG: at 13:55

## 2022-02-15 RX ADMIN — SODIUM CHLORIDE: 900 INJECTION, SOLUTION INTRAVENOUS at 11:43

## 2022-02-15 RX ADMIN — HYDROMORPHONE HYDROCHLORIDE 1 MG: 1 INJECTION, SOLUTION INTRAMUSCULAR; INTRAVENOUS; SUBCUTANEOUS at 23:53

## 2022-02-15 RX ADMIN — HYDROMORPHONE HYDROCHLORIDE 0.5 MG: 2 INJECTION INTRAMUSCULAR; INTRAVENOUS; SUBCUTANEOUS at 14:27

## 2022-02-15 RX ADMIN — BUPIVACAINE HYDROCHLORIDE 25 ML: 2.5 INJECTION, SOLUTION EPIDURAL; INFILTRATION; INTRACAUDAL; PERINEURAL at 13:57

## 2022-02-15 RX ADMIN — ESMOLOL HYDROCHLORIDE 40 MG: 10 INJECTION, SOLUTION INTRAVENOUS at 14:04

## 2022-02-15 RX ADMIN — ONDANSETRON 4 MG: 2 INJECTION INTRAMUSCULAR; INTRAVENOUS at 13:32

## 2022-02-15 RX ADMIN — SODIUM CHLORIDE, SODIUM LACTATE, POTASSIUM CHLORIDE, AND CALCIUM CHLORIDE: 600; 310; 30; 20 INJECTION, SOLUTION INTRAVENOUS at 13:18

## 2022-02-15 RX ADMIN — ROCURONIUM BROMIDE 10 MG: 10 INJECTION, SOLUTION INTRAVENOUS at 13:01

## 2022-02-15 RX ADMIN — Medication 10 MG: at 12:59

## 2022-02-15 RX ADMIN — DEXAMETHASONE SODIUM PHOSPHATE 4 MG: 4 INJECTION, SOLUTION INTRAMUSCULAR; INTRAVENOUS at 13:57

## 2022-02-15 RX ADMIN — ROCURONIUM BROMIDE 50 MG: 10 INJECTION, SOLUTION INTRAVENOUS at 11:30

## 2022-02-15 RX ADMIN — HYDROMORPHONE HYDROCHLORIDE 0.5 MG: 2 INJECTION INTRAMUSCULAR; INTRAVENOUS; SUBCUTANEOUS at 15:50

## 2022-02-15 RX ADMIN — SODIUM CHLORIDE, PRESERVATIVE FREE 10 ML: 5 INJECTION INTRAVENOUS at 22:00

## 2022-02-15 RX ADMIN — PROPOFOL 200 MG: 10 INJECTION, EMULSION INTRAVENOUS at 11:30

## 2022-02-15 RX ADMIN — DEXTROSE MONOHYDRATE AND SODIUM CHLORIDE 75 ML/HR: 5; .45 INJECTION, SOLUTION INTRAVENOUS at 14:55

## 2022-02-15 RX ADMIN — ACETAMINOPHEN 1000 MG: 500 TABLET ORAL at 10:22

## 2022-02-15 NOTE — PROGRESS NOTES
02/15/22 1814   Dual Skin Pressure Injury Assessment   Dual Skin Pressure Injury Assessment WDL   Second Care Provider (Based on [address] [place] [address]) Christy     No skin breakdown, Right flank/abdomen with incision covered with dressing, CDI.

## 2022-02-15 NOTE — ANESTHESIA PROCEDURE NOTES
Arterial Line Placement    Start time: 2/15/2022 11:40 AM  End time: 2/15/2022 11:41 AM  Performed by: Sapna Linder MD  Authorized by: Sapna Linder MD     Pre-Procedure  Preanesthetic Checklist: patient identified, risks and benefits discussed, anesthesia consent, site marked, patient being monitored, timeout performed and patient being monitored    Timeout Time: 11:45 EST        Procedure:   Prep:  Chlorhexidine  Seldinger Technique?: No    Orientation:  Left  Location:  Radial artery  Catheter size:  20 G  Number of attempts:  2  Cont Cardiac Output Sensor: No      Assessment:   Post-procedure:  Line secured and sterile dressing applied  Patient Tolerance:  Patient tolerated the procedure well with no immediate complications

## 2022-02-15 NOTE — ANESTHESIA POSTPROCEDURE EVALUATION
Procedure(s):  RIGHT OPEN PARTIAL NEPHRECTOMY . general    Anesthesia Post Evaluation      Multimodal analgesia: multimodal analgesia used between 6 hours prior to anesthesia start to PACU discharge  Patient location during evaluation: bedside  Patient participation: complete - patient participated  Level of consciousness: awake and responsive to light touch  Pain management: adequate  Airway patency: patent  Anesthetic complications: no  Cardiovascular status: acceptable, hemodynamically stable, blood pressure returned to baseline and stable  Respiratory status: acceptable, unassisted, spontaneous ventilation and nonlabored ventilation  Hydration status: acceptable  Post anesthesia nausea and vomiting:  controlled      INITIAL Post-op Vital signs:   Vitals Value Taken Time   /95 02/15/22 1448   Temp 36.4 °C (97.5 °F) 02/15/22 1418   Pulse 71 02/15/22 1452   Resp 16 02/15/22 1445   SpO2 96 % 02/15/22 1452   Vitals shown include unvalidated device data.

## 2022-02-15 NOTE — ANESTHESIA PROCEDURE NOTES
Ultrasound guided erector spinae single shot Peripheral Block    Start time: 2/15/2022 1:54 PM  End time: 2/15/2022 1:57 PM  Performed by: Kellee Hargrove MD  Authorized by: Kellee Hargrove MD       Block Type:   Block Type:  Erector spinae  Laterality:  Right  Monitoring:  Continuous pulse ox, standard ASA monitoring, heart rate and oxygen  Injection Technique:  Single shot  Procedures: ultrasound guided    Patient Position: left lateral decubitus  Prep: chlorhexidine    : T8 paraspinal gutter.   Needle Type:  Stimuplex  Needle Gauge:  20 G  Needle Localization:  Ultrasound guidance  Medication Injected:  Bupivacaine 0.25%, 25 mL  dexamethasone (DECADRON) 4 mg/mL injection, 4 mg  Med Admin Time: 2/15/2022 1:57 PM    Assessment:  Number of attempts:  1  Injection Assessment:  Incremental injection every 5 mL, local visualized surrounding nerve on ultrasound, ultrasound image on chart, negative aspiration for blood and no intravascular symptoms  Patient tolerance:  Patient tolerated the procedure well with no immediate complications

## 2022-02-15 NOTE — PROGRESS NOTES
Pt arrived to room 630, wife at bedside. Pt denies pain; dressing to right flank clean, dry, and without drainage noted. SUDHIR drain emptied of 30cc's bright, red bloody drainage. Cerda cath yields pink/red UOP. Pt alert and oriented.

## 2022-02-15 NOTE — BRIEF OP NOTE
Brief Postoperative Note    Patient: Red Greenwood  YOB: 1970  MRN: [id number]    Date of Procedure: 2/15/2022     Pre-Op Diagnosis: Right renal mass [N28.89]    Post-Op Diagnosis: Same as preoperative diagnosis.       Procedure(s):  RIGHT OPEN PARTIAL NEPHRECTOMY     Surgeon(s):  Angeles Kidd MD    Surgical Assistant: None    Anesthesia: General     Estimated Blood Loss (mL): 103    Complications: None    Specimens:   ID Type Source Tests Collected by Time Destination   1 : Right renal mass Fresh Kidney, Right  Angeles Kidd MD 2/15/2022 1253 Pathology        Implants: * No implants in log *    Drains: * No LDAs found *    Findings: large right renal mass; not into collecting system; single artery and vein    Electronically Signed by Saige Vaughan MD on 2/15/2022 at 1:40 PM

## 2022-02-15 NOTE — ANESTHESIA PREPROCEDURE EVALUATION
Relevant Problems   No relevant active problems       Anesthetic History   No history of anesthetic complications            Review of Systems / Medical History  Patient summary reviewed, nursing notes reviewed and pertinent labs reviewed    Pulmonary        Sleep apnea: CPAP           Neuro/Psych   Within defined limits           Cardiovascular    Hypertension              Exercise tolerance: >4 METS     GI/Hepatic/Renal     GERD: well controlled           Endo/Other        Obesity     Other Findings              Physical Exam    Airway  Mallampati: II  TM Distance: 4 - 6 cm  Neck ROM: normal range of motion   Mouth opening: Normal     Cardiovascular  Regular rate and rhythm,  S1 and S2 normal,  no murmur, click, rub, or gallop  Rhythm: regular  Rate: normal      Pertinent negatives: No murmur   Dental  No notable dental hx       Pulmonary  Breath sounds clear to auscultation               Abdominal         Other Findings            Anesthetic Plan    ASA: 3  Anesthesia type: general    Monitoring Plan: Arterial line      Induction: Intravenous  Anesthetic plan and risks discussed with: Patient      Abd blocks

## 2022-02-16 LAB
ANION GAP SERPL CALC-SCNC: 5 MMOL/L (ref 7–16)
BUN SERPL-MCNC: 12 MG/DL (ref 6–23)
CALCIUM SERPL-MCNC: 8.5 MG/DL (ref 8.3–10.4)
CHLORIDE SERPL-SCNC: 104 MMOL/L (ref 98–107)
CO2 SERPL-SCNC: 25 MMOL/L (ref 21–32)
CREAT SERPL-MCNC: 0.8 MG/DL (ref 0.8–1.5)
ERYTHROCYTE [DISTWIDTH] IN BLOOD BY AUTOMATED COUNT: 13.7 % (ref 11.9–14.6)
GLUCOSE SERPL-MCNC: 145 MG/DL (ref 65–100)
HCT VFR BLD AUTO: 36.9 % (ref 41.1–50.3)
HGB BLD-MCNC: 12 G/DL (ref 13.6–17.2)
MCH RBC QN AUTO: 26.3 PG (ref 26.1–32.9)
MCHC RBC AUTO-ENTMCNC: 32.5 G/DL (ref 31.4–35)
MCV RBC AUTO: 80.7 FL (ref 79.6–97.8)
NRBC # BLD: 0 K/UL (ref 0–0.2)
PLATELET # BLD AUTO: 146 K/UL (ref 150–450)
PMV BLD AUTO: 11.6 FL (ref 9.4–12.3)
POTASSIUM SERPL-SCNC: 4.3 MMOL/L (ref 3.5–5.1)
RBC # BLD AUTO: 4.57 M/UL (ref 4.23–5.6)
SODIUM SERPL-SCNC: 134 MMOL/L (ref 138–145)
WBC # BLD AUTO: 11.6 K/UL (ref 4.3–11.1)

## 2022-02-16 PROCEDURE — 74011250636 HC RX REV CODE- 250/636: Performed by: UROLOGY

## 2022-02-16 PROCEDURE — 74011000250 HC RX REV CODE- 250: Performed by: UROLOGY

## 2022-02-16 PROCEDURE — 74011250637 HC RX REV CODE- 250/637: Performed by: UROLOGY

## 2022-02-16 PROCEDURE — 2709999900 HC NON-CHARGEABLE SUPPLY

## 2022-02-16 PROCEDURE — 65270000029 HC RM PRIVATE

## 2022-02-16 PROCEDURE — 85027 COMPLETE CBC AUTOMATED: CPT

## 2022-02-16 PROCEDURE — 36415 COLL VENOUS BLD VENIPUNCTURE: CPT

## 2022-02-16 PROCEDURE — 80048 BASIC METABOLIC PNL TOTAL CA: CPT

## 2022-02-16 RX ORDER — KETOROLAC TROMETHAMINE 15 MG/ML
15 INJECTION, SOLUTION INTRAMUSCULAR; INTRAVENOUS EVERY 8 HOURS
Status: COMPLETED | OUTPATIENT
Start: 2022-02-16 | End: 2022-02-16

## 2022-02-16 RX ADMIN — SENNOSIDES AND DOCUSATE SODIUM 1 TABLET: 8.6; 5 TABLET ORAL at 08:04

## 2022-02-16 RX ADMIN — ACETAMINOPHEN 650 MG: 325 TABLET ORAL at 09:37

## 2022-02-16 RX ADMIN — DEXTROSE MONOHYDRATE AND SODIUM CHLORIDE 75 ML/HR: 5; .45 INJECTION, SOLUTION INTRAVENOUS at 03:48

## 2022-02-16 RX ADMIN — KETOROLAC TROMETHAMINE 15 MG: 15 INJECTION, SOLUTION INTRAMUSCULAR; INTRAVENOUS at 14:47

## 2022-02-16 RX ADMIN — KETOROLAC TROMETHAMINE 15 MG: 15 INJECTION, SOLUTION INTRAMUSCULAR; INTRAVENOUS at 21:16

## 2022-02-16 RX ADMIN — HYDROCODONE BITARTRATE AND ACETAMINOPHEN 1 TABLET: 5; 325 TABLET ORAL at 11:49

## 2022-02-16 RX ADMIN — KETOROLAC TROMETHAMINE 15 MG: 15 INJECTION, SOLUTION INTRAMUSCULAR; INTRAVENOUS at 08:04

## 2022-02-16 RX ADMIN — SENNOSIDES AND DOCUSATE SODIUM 1 TABLET: 8.6; 5 TABLET ORAL at 19:02

## 2022-02-16 RX ADMIN — SODIUM CHLORIDE, PRESERVATIVE FREE 10 ML: 5 INJECTION INTRAVENOUS at 06:30

## 2022-02-16 RX ADMIN — SODIUM CHLORIDE, PRESERVATIVE FREE 10 ML: 5 INJECTION INTRAVENOUS at 19:02

## 2022-02-16 RX ADMIN — SODIUM CHLORIDE, PRESERVATIVE FREE 10 ML: 5 INJECTION INTRAVENOUS at 21:16

## 2022-02-16 NOTE — PROGRESS NOTES
rounded on patient who stated that communication with staff has been ok. No further needs expressed at this moment.          Thank you,            Janette Bonner Press  [address]  [facility]  [phone] (phone)

## 2022-02-16 NOTE — PROGRESS NOTES
Pt has ambulated. He is tolerating food. Cerda removed this morning, he has not yet voided, denies SP pain/pressure. Plan-  Bladder scan pt if not able to void in next 1-2 hours.   Give Incentive spirometer and have pt use q hour while awake  Remove SUDHIR drain

## 2022-02-16 NOTE — PROGRESS NOTES
Problem: Infection - Risk of, Surgical Site Infection  Goal: *Absence of surgical site infection signs and symptoms  Outcome: Progressing Towards Goal     Problem: Nephrectomy Pathway: Day 1  Goal: Activity/Safety  Outcome: Progressing Towards Goal  Goal: Nutrition/Diet  Outcome: Progressing Towards Goal  Goal: Medications  Outcome: Progressing Towards Goal  Goal: Respiratory  Outcome: Progressing Towards Goal  Goal: Treatments/Interventions/Procedures  Outcome: Progressing Towards Goal  Goal: Psychosocial  Outcome: Progressing Towards Goal  Goal: *No signs and symptoms of infection or wound complications  Outcome: Progressing Towards Goal  Goal: *Optimal pain control at patient's stated goal  Outcome: Progressing Towards Goal  Goal: *Adequate urinary output (equal to or greater than 30 milliliters/hour)  Outcome: Progressing Towards Goal  Goal: *Hemodynamically stable  Outcome: Progressing Towards Goal  Goal: *Tolerating diet  Outcome: Progressing Towards Goal  Goal: *Demonstrates progressive activity  Outcome: Progressing Towards Goal

## 2022-02-16 NOTE — PROGRESS NOTES
Pt ambulated the whole floor 1 lap unassisted without difficulty. Pt is on RA. SUDHIR drain intact. Dressing clean and dry. Pt c/o pain after ambulation. PRN meds administered. PRN meds administered earlier for c/o pain with relief noted. Will continue to monitor and provide care.

## 2022-02-16 NOTE — PROGRESS NOTES
Chart reviewed by JOY WALLACE for discharge planning. No supportive care orders received or CM needs expressed at this time. PT/OT has not been consulted. Per chart review, patient ambulated in hallways, unassisted without difficulty. Patient with anticipated discharge 2/17. Patient to return home. Please consult CM if needs arise. CM following plan of care. Care Management Interventions  PCP Verified by CM:  (Gustavo Sun MD )  Mode of Transport at Discharge:  Other (see comment) (Family. )  Transition of Care Consult (CM Consult): Discharge Planning  Physical Therapy Consult: No  Occupational Therapy Consult: No  Speech Therapy Consult: No  Support Systems: Other Family Member(s)  Confirm Follow Up Transport: Self  Discharge Location  Patient Expects to be Discharged to[de-identified] Home

## 2022-02-16 NOTE — PROGRESS NOTES
Urology Progress Note    Admit Date: 2/15/2022    Subjective:     Patient has no new complaints other than flank pain. Objective:     Patient Vitals for the past 8 hrs:   BP Temp Pulse Resp SpO2 Weight   02/16/22 0507 124/80 98.2 °F (36.8 °C) 82 18 96 % 219 lb 9.3 oz (99.6 kg)     02/16 0701 - 02/16 1900  In: -   Out: 80 [Drains:80]  02/14 1901 - 02/16 0700  In: 1500 [I.V.:1500]  Out: 7932 [Urine:1550; Drains:260]    Physical Exam:     Awake, alert, and oriented  Lungs no JVD. Breathing is  non-labored; no audible wheezing. Heart regular, normal perfusion  Abd soft, nt, nd  UOP clear      Data Review   Recent Results (from the past 24 hour(s))   TYPE & SCREEN    Collection Time: 02/15/22 10:06 AM   Result Value Ref Range    Crossmatch Expiration 02/18/2022,2359     ABO/Rh(D) A POSITIVE     Antibody screen NEG     Unit number [id number]     Blood component type  LR     Unit division 00     Status of unit ALLOCATED     Crossmatch result Compatible     Unit number [id number]     Blood component type  LR     Unit division 00     Status of unit ALLOCATED     Crossmatch result Compatible    RBC, ALLOCATE    Collection Time: 02/15/22 11:15 AM   Result Value Ref Range    HISTORY CHECKED?  Historical check performed    METABOLIC PANEL, BASIC    Collection Time: 02/16/22  5:56 AM   Result Value Ref Range    Sodium 134 (L) 138 - 145 mmol/L    Potassium 4.3 3.5 - 5.1 mmol/L    Chloride 104 98 - 107 mmol/L    CO2 25 21 - 32 mmol/L    Anion gap 5 (L) 7 - 16 mmol/L    Glucose 145 (H) 65 - 100 mg/dL    BUN 12 6 - 23 MG/DL    Creatinine 0.80 0.8 - 1.5 MG/DL    GFR est AA >60 >60 ml/min/1.73m2    GFR est non-AA >60 >60 ml/min/1.73m2    Calcium 8.5 8.3 - 10.4 MG/DL   CBC W/O DIFF    Collection Time: 02/16/22  5:56 AM   Result Value Ref Range    WBC 11.6 (H) 4.3 - 11.1 K/uL    RBC 4.57 4.23 - 5.6 M/uL    HGB 12.0 (L) 13.6 - 17.2 g/dL    HCT 36.9 (L) 41.1 - 50.3 %    MCV 80.7 79.6 - 97.8 FL    MCH 26.3 26.1 - 32.9 PG MCHC 32.5 31.4 - 35.0 g/dL    RDW 13.7 11.9 - 14.6 %    PLATELET 893 (L) 835 - 450 K/uL    MPV 11.6 9.4 - 12.3 FL    ABSOLUTE NRBC 0.00 0.0 - 0.2 K/uL           Assessment:     Active Problems:    Renal mass, right (2/15/2022)    POD 1 right flank partial Nx--doing well.      Plan:     -add toradol  -OOB walk  -d/c krishnan  -d/c ronald in pm  -home tomorrow      Yudith Weaver MD    [facility]  [address]

## 2022-02-16 NOTE — OP NOTES
[address] [facility]  OPERATIVE REPORT    Name:  Barbara Adam  MR#:  [id number]  :  1970  ACCOUNT #:  [de-identified]  DATE OF SERVICE:  02/15/2022    PREOPERATIVE DIAGNOSIS:  Large right renal mass. POSTOPERATIVE DIAGNOSIS:  Large right renal mass. PROCEDURES PERFORMED:  1. Right open partial nephrectomy. 2.  Intraoperative ultrasound. SURGEON:  Sheba Rondon MD    ASSISTANT:  Jaime Stagezay    ANESTHESIA:  General endotracheal.    COMPLICATIONS:  None. SPECIMENS REMOVED:  Right renal mass. IMPLANTS:  None. ESTIMATED BLOOD LOSS:  500 mL. IV FLUIDS:  1.5 liters. INDICATIONS FOR PROCEDURE:  The patient is a pleasant 58-year-old male who came in with right upper quadrant pain. He was found to have acute cholecystitis. On abdominal ultrasound, he was found to have a large incidentally found right renal mass. CT scan later confirmed this as an enhancing 8 cm renal tumor. PROCEDURE:  After informed consent was obtained, he was taken to operating room #11 at [place] [address]. After induction of general anesthesia and placement of an endotracheal tube, he was carefully positioned in the flank position with his right side up. An axillary roll was placed and all pressure points were carefully padded. A Cerda catheter had been placed sterilely previously. His abdomen was then prepped and draped in the usual sterile fashion. A time-out was performed confirming the side of the procedure. He was given Ancef as a prophylactic antibiotic. We then made an approximately 8 cm incision off the tip of the 11th rib. Using electrocautery, we carried this skin incision down through the subcutaneous tissue and open the first layer of external oblique fascia. We then opened the fascia of the internal obliques and bluntly divided the transversalis. I swept away the peritoneum and entered the lumbodorsal fascia just off of and slightly superior to the 11th rib.   I then was able to carefully develop the retroperitoneal space posterolateral to the kidney. We then were able to further sweep down the peritoneum anteriorly and extended the incision. Bookwalter was placed. I then carefully opened Gerota's fascia and was able to expose the medial surface of the kidney. We then carefully freed the kidney. There were some large accessory veins along the posterior and lateral portions of the mass. These were controlled and divided with a LigaSure device. I then carefully dissected out the renal hilum. There was a single renal artery and vein. The artery was split into an anterior and posterior branch. We then removed most of the perirenal fat surrounding the tumor and we were able to visualize the interface between the tumor and the normal kidney. We then performed an intraoperative ultrasound further defining these boundaries and outlined the area of dissection on the kidney using electrocautery. I then placed Bulldog clamps on both the anterior and posterior branches of the main renal artery. We then cooled the kidney with ice/saline. Next, using electrocautery, I incised the renal parenchyma just off of the renal tumor. Then using the Metzenbaum scissors, I carefully excised the tumor completely. It was encapsulated and actually came off of the normal renal parenchyma quite easily. We were able to stay just outside the capsule of the tumor. It was excised completely and passed off and sent to Pathology. We then performed the renorrhaphy. Using 3-0 Vicryls in an interrupted fashion, I oversewed three bleeding vein branches. I then ran two 3-0 Vicryls through the bed of the tumor in a baseball fashion to control any potential arterial bleeders. Three bolster stitches were then used with Hem-o-gerardo clips on each end to approximate the edges of the defect together. We then unclamped the Bulldogs. The cold ischemic time was 16 minutes.   There was good hemostasis. Next, I placed some Surgicel into the defect and using another bolster stitch, I was able to reapproximate some of the perirenal fat in Gerota's fascia over the defect. I then placed the #19 Vinnie drain through a separate stab incision and secured it with a 2-0 nylon. The fascia was then closed in two layers using #1 PDS. 2-0 Vicryl was used to approximate the subcuticular tissues and a 4-0 Monocryl to close the skin edges. There were no complications. The patient was then awakened and extubated and taken to the recovery room in stable condition. All counts were correct. DISPOSITION:  The patient will be admitted for postoperative care. We will plan to remove the Cerda catheter and SUDHIR drain tomorrow.       MD EDISON Laird/[id number]  D:  02/15/2022 14:09  T:  02/16/2022 0:49  JOB #:  [id number]

## 2022-02-17 VITALS
SYSTOLIC BLOOD PRESSURE: 117 MMHG | HEIGHT: 66 IN | HEART RATE: 78 BPM | WEIGHT: 219.58 LBS | BODY MASS INDEX: 35.29 KG/M2 | TEMPERATURE: 97.7 F | RESPIRATION RATE: 18 BRPM | DIASTOLIC BLOOD PRESSURE: 75 MMHG | OXYGEN SATURATION: 97 %

## 2022-02-17 PROCEDURE — 77030027138 HC INCENT SPIROMETER -A

## 2022-02-17 PROCEDURE — 74011250637 HC RX REV CODE- 250/637: Performed by: NURSE PRACTITIONER

## 2022-02-17 PROCEDURE — 99024 POSTOP FOLLOW-UP VISIT: CPT | Performed by: NURSE PRACTITIONER

## 2022-02-17 PROCEDURE — 74011000250 HC RX REV CODE- 250: Performed by: UROLOGY

## 2022-02-17 PROCEDURE — 74011250637 HC RX REV CODE- 250/637: Performed by: UROLOGY

## 2022-02-17 RX ORDER — FACIAL-BODY WIPES
10 EACH TOPICAL ONCE
Status: COMPLETED | OUTPATIENT
Start: 2022-02-17 | End: 2022-02-17

## 2022-02-17 RX ORDER — AMOXICILLIN 250 MG
1 CAPSULE ORAL 2 TIMES DAILY
Qty: 20 TABLET | Refills: 0 | Status: SHIPPED | OUTPATIENT
Start: 2022-02-17

## 2022-02-17 RX ORDER — HYDROCODONE BITARTRATE AND ACETAMINOPHEN 5; 325 MG/1; MG/1
1 TABLET ORAL
Qty: 15 TABLET | Refills: 0 | Status: SHIPPED | OUTPATIENT
Start: 2022-02-17 | End: 2022-02-20

## 2022-02-17 RX ADMIN — SODIUM CHLORIDE, PRESERVATIVE FREE 10 ML: 5 INJECTION INTRAVENOUS at 05:12

## 2022-02-17 RX ADMIN — HYDROCODONE BITARTRATE AND ACETAMINOPHEN 1 TABLET: 5; 325 TABLET ORAL at 08:05

## 2022-02-17 RX ADMIN — SENNOSIDES AND DOCUSATE SODIUM 1 TABLET: 8.6; 5 TABLET ORAL at 08:05

## 2022-02-17 RX ADMIN — SODIUM CHLORIDE, PRESERVATIVE FREE 10 ML: 5 INJECTION INTRAVENOUS at 13:47

## 2022-02-17 RX ADMIN — BISACODYL 10 MG: 10 SUPPOSITORY RECTAL at 11:49

## 2022-02-17 NOTE — DISCHARGE INSTRUCTIONS
Patient Education        Rafael Rear: Tashi Rasp en el hogar  Laparoscopic Nephrectomy: What to Expect at Home  Bryant recuperación  Tara nefrectomía es tara cirugía que se hace para extraer tara parte del Anthony Sick o Shalini Rast completo. Es posible que se extraiga abeba o [employer]. A veces se extraen otros tejidos que se encuentran cerca de los riñones al [employer]. Después de la cirugía, sentirá el vientre adolorido. [place] suele durar entre 1 y 2 semanas, aproximadamente. El médico le administrará analgésicos (medicamentos para el dolor) para esto. Además, es posible que [address] síntomas, cain náuseas, [place], estreñimiento, gases o dolor de [place]. Al principio, es posible que tenga poca energía y se fatiga rápidamente. Podría llevarle entre 3 y 6 meses recuperar bryant energía por completo. Bryant cuerpo puede funcionar charlie con un riñón myriam. Si se extraen ambos riñones o el riñón remanente no está saludable, bryant médico hablará con usted acerca del tipo de tratamiento que necesitará después de la cirugía. Esta hoja de [employer] idea general del tiempo que le llevará recuperarse. Sin embargo, cada persona se recupera a un ritmo diferente. Siga los pasos que se mencionan a continuación para recuperarse lo más rápido posible. ¿Cómo puede cuidarse en el hogar? Actividad    · Descanse cuando se sienta cansado. Dormir lo suficiente le ayudará a recuperarse.     · Intente caminar todos los días. Comience caminando un poco más de lo que caminó el día anterior. Poco a poco, aumente la distancia. Caminar aumenta el flujo de luis y [place] a prevenir la neumonía y el estreñimiento.     · Evite ejercicios que utilicen los músculos del vientre y actividades vigorosas, cain montar en bicicleta, trotar, levantar pesas o hacer ejercicio aeróbico, hasta que bryant médico lo apruebe.     · Dionne al menos 4 semanas, evite levantar objetos que pudieran implicar un esfuerzo.  [place] podría incluir un roberta, bolsas de compras y recipientes para [place] pesados, mochilas o maletines pesados, bolsas de arena para excrementos de mauricio o alimentos para dipika, o tara aspiradora.     · Sostenga tara [employer] avendaño (incisiones) que le hizo el médico cuando tosa o respire profundo. [place] sostendrá bryant vientre y reducirá el dolor.     · Edgar los ejercicios respiratorios en bryant hogar según las indicaciones del médico. [place] ayudará a prevenir la neumonía.     · Pregúntele a bryant médico cuándo puede volver a conducir.     · Es probable que necesite ausentarse del trabajo de 4 a 6 semanas. [place] dependerá del tipo de trabajo que edgar y cómo se sienta.     · Es posible que pueda ducharse (a menos que tenga un tubo de drenaje cerca las heridas). Si tiene un tubo de drenaje, siga las indicaciones del médico para vaciarlo y cuidarlo. No tome un baño de inmersión en tara dean boris las primeras 2 semanas o hasta que bryant médico lo apruebe.     · Pregúntele a bryant médico cuándo [address] Dieta    · Puede continuar con bryant dieta normal. Si tenía tara dieta especial para flora riñones antes de la cirugía, sígala hasta que bryant médico le indique otra cosa.     · Si tiene malestar estomacal, pruebe con alimentos blandos bajos en grasas, cain arroz sin condimentar, karely a la maliha, pan chasity y yogur.     · Maame abundantes líquidos (a menos que bryant médico le indique lo contrario).     · Podría notar que no evacúa el intestino con regularidad sanjeev después de la cirugía. [place] es común. Trate de evitar el estreñimiento y de no hacer esfuerzos cuando evacúa el intestino. Sería conveniente isabel un suplemento de [employer]. Si no ha evacuado el intestino después de un par de días, pregúntele a bryant médico si puede isabel un laxante suave. Medicamentos    · Bryant médico le dirá si puede volver a isabel flora medicamentos y cuándo puede volver a hacerlo.  También le dará indicaciones sobre cualquier medicamento nuevo que deba isabel usted.     · Si zane aspirina o cualquier otro medicamento que previene los coágulos de [place], pregúntele a bryant médico si debería volver a tomarlo y en qué momento. Asegúrese de entender exactamente lo que bryant médico quiere que psacale.     · [place] los analgésicos exactamente cain le fueron indicados. ? Si el médico le recetó un analgésico, tómelo según las indicaciones. ? Si no está tomando analgésicos recetados, tómese abeba de venta wendy que bryant médico le recomiende. [place] y siga todas las instrucciones de la [place]. ? No tome aspirina, ibuprofeno (Advil, Motrin), naproxeno (Aleve) u otros antiinflamatorios no esteroideos (MIRANDA) a menos que bryant médico le diga que puede hacerlo.     · Si le parece que el analgésico le está produciendo revoltura del estómago:  ? [place] el medicamento después de las comidas (a menos que bryant médico le haya indicado lo contrario). ? [place] al médico un analgésico diferente.     · Si bryant médico le recetó antibióticos, tómelos según las indicaciones. No deje de tomarlos por el hecho de sentirse mejor. Debe isabel todos los antibióticos hasta terminarlos. Cuidado de la herida    · Si tiene tiras de cinta adhesiva sobre la herida, déjeselas puestas tara semana o hasta que se caigan por sí solas.     · Lave diariamente la monika alrededor de las heridas con agua jabonosa tibia y séquela con toques suaves de toalla. No use peróxido de hidrógeno [place]) ni alcohol, ya que pueden retrasar la sanación. Puede cubrir las heridas con vendas de gasa si supuran o rozan contra la ropa. Cambie las vendas todos los días.     · Mantenga la monika alrededor de las heridas limpia y [place]. La atención de seguimiento es tara parte clave de bryant tratamiento y seguridad. Asegúrese de hacer y acudir a todas las citas, y llame a bryant médico si está teniendo problemas. También es tara buena idea saber los resultados de flora exámenes y mantener tara lista de los medicamentos que zane. ¿Cuándo debe pedir ayuda?    Llame al 911 en cualquier momento que considere que necesita atención de emergencia. Por ejemplo, llame si:    · Se desmayó (perdió el conocimiento).     · Tiene graves dificultades para respirar.     · Tiene dolor repentino en el pecho y falta de [place], o tose luis.     · Tiene dolor intenso en el vientre. Llame a bryant médico ahora mismo o busque atención médica inmediata si:    · Tiene dolor que no mejora después de isabel analgésicos.     · Presenta síntomas de tara infección urinaria. Por ejemplo:  ? Tiene luis o pus en la orina. ? Tiene dolor de espalda sanjeev debajo de las [place] se llama dolor costal o dolor en el flanco.  ? Tiene fiebre, escalofríos o gianfranco por todo el cuerpo. ? Siente dolor al [place]. ? Tiene dolor en el vientre o la ying.     · Tiene puntos de sutura flojos o se abre la herida.     · Está sangrando de la herida.     · Tiene señales de infección, tales cain:  ? Aumento del dolor, la hinchazón, el enrojecimiento o la temperatura. ? Vetas rojizas que salen de la herida. ? Pus que supura de la herida. ? Ganglios linfáticos inflamados en el rodney, las axilas o la ying. ? Marianne Boer.     · Tiene problemas para orinar o evacuar el intestino, en especial si tiene dolor o hinchazón en la parte inferior del vientre. Preste especial atención a los cambios en bryant carlee y asegúrese de comunicarse con bryant médico si:    · No evacúa el intestino después de isabel un laxante. ¿Dónde puede encontrar más información en inglés? Vaya a http://www.cadena.com/  Rocael Koehler L270 en la búsqueda para aprender más acerca de \"Nefrectomía laparoscópica: [place] en el hogar. \"  Revisado: 17 diciembre, 2020               Versión del contenido: 13.0  © 8531-3017 Healthwise, Incorporated. Las instrucciones de cuidado fueron adaptadas bajo licencia por [employer] (which disclaims liability or warranty for this information).  Si usted tiene preguntas sobre tara afección [place] o sobre estas instrucciones, siempre pregunte a bryant profesional de carlee. [facility] niega toda garantía o responsabilidad por bryant uso de esta información.

## 2022-02-17 NOTE — PROGRESS NOTES
Pt denies needs at this time. Scheduled and PRN meds administered with relief noted. Pt has ambulated the qureshi with no assistance twice during shift. He reports voiding twice during shift without difficulty. Wife at bedside. Bandages clean, dry and intact. Bed in low position, locked and call light/personal items within reach. Will continue to monitor and report to night shift nurse.

## 2022-02-17 NOTE — PROGRESS NOTES
Discharge instructions and education given to patient. Questions asked and answered; pt verbalizes understanding. Medication prescription at [facility] to be picked up and follow up appointments given. AVS reviewed, signed and copy placed in chart.  Pt discharged via wheelchair to front of hospital.

## 2022-02-17 NOTE — PROGRESS NOTES
Admit Date: 2/15/2022    Subjective:     Nilda Hinojosa is POD 2 partial nephrectomy. Voiding well sp krishnan removal.  Pain well controlled. Tolerating food, denies flatus. Abdomen soft. Objective:     Patient Vitals for the past 8 hrs:   BP Temp Pulse Resp SpO2   02/17/22 1123 117/75 97.7 °F (36.5 °C) 78 18 97 %   02/17/22 0730 112/79 99.2 °F (37.3 °C) 71 19 95 %   02/17/22 0424 116/71 98.3 °F (36.8 °C) 70 16 94 %     02/17 0701 - 02/17 1900  In: 480 [P.O.:480]  Out: 825 [Urine:825]  02/15 1901 - 02/17 0700  In: 240 [P.O.:240]  Out: 1885 [Urine:1625; Drains:260]    Physical Exam:  GENERAL: alert, cooperative, no distress  LUNG: clear to auscultation bilaterally  HEART: regular rate and rhythm, S1, S2   ABDOMEN: soft, non-tender, incisions cdi  NEUROLOGIC: AOx3    Data Review No results found for this or any previous visit (from the past 24 hour(s)). Assessment:     Active Problems:    Renal mass, right (2/15/2022)    ]  POD 2:    POSTOPERATIVE DIAGNOSIS:  Large right renal mass.     PROCEDURES PERFORMED:  1. Right open partial nephrectomy. 2.  Intraoperative ultrasound.     Afebrile VSS      Plan:     Give dulcolax suppository. D/c home today after lunch. RTO as scheduled for follow up appt.        Janine Montano NP  [facility]

## 2022-02-17 NOTE — PROGRESS NOTES
Problem: Infection - Risk of, Surgical Site Infection  Goal: *Absence of surgical site infection signs and symptoms  Outcome: Progressing Towards Goal     Problem: Patient Education: Go to Patient Education Activity  Goal: Patient/Family Education  Outcome: Progressing Towards Goal     Problem: Infection - Risk of, Urinary Catheter-Associated Urinary Tract Infection  Goal: *Absence of infection signs and symptoms  Outcome: Progressing Towards Goal     Problem: Patient Education: Go to Patient Education Activity  Goal: Patient/Family Education  Outcome: Progressing Towards Goal     Problem: Patient Education: Go to Patient Education Activity  Goal: Patient/Family Education  Outcome: Progressing Towards Goal     Problem: Nephrectomy Pathway: Day 1  Goal: Off Pathway (Use only if patient is Off Pathway)  Outcome: Progressing Towards Goal  Goal: Activity/Safety  Outcome: Progressing Towards Goal  Goal: Consults, if ordered  Outcome: Progressing Towards Goal  Goal: Nutrition/Diet  Outcome: Progressing Towards Goal  Goal: Medications  Outcome: Progressing Towards Goal  Goal: Respiratory  Outcome: Progressing Towards Goal  Goal: Treatments/Interventions/Procedures  Outcome: Progressing Towards Goal  Goal: Psychosocial  Outcome: Progressing Towards Goal  Goal: *No signs and symptoms of infection or wound complications  Outcome: Progressing Towards Goal  Goal: *Optimal pain control at patient's stated goal  Outcome: Progressing Towards Goal  Goal: *Adequate urinary output (equal to or greater than 30 milliliters/hour)  Outcome: Progressing Towards Goal  Goal: *Hemodynamically stable  Outcome: Progressing Towards Goal  Goal: *Tolerating diet  Outcome: Progressing Towards Goal  Goal: *Demonstrates progressive activity  Outcome: Progressing Towards Goal     Problem: Nephrectomy Pathway: Day 2  Goal: Off Pathway (Use only if patient is Off Pathway)  Outcome: Progressing Towards Goal  Goal: Activity/Safety  Outcome: Progressing Towards Goal  Goal: Diagnostic Test/Procedures  Outcome: Progressing Towards Goal  Goal: Nutrition/Diet  Outcome: Progressing Towards Goal  Goal: Discharge Planning  Outcome: Progressing Towards Goal  Goal: Medications  Outcome: Progressing Towards Goal  Goal: Respiratory  Outcome: Progressing Towards Goal  Goal: Treatments/Interventions/Procedures  Outcome: Progressing Towards Goal  Goal: Psychosocial  Outcome: Progressing Towards Goal  Goal: *No signs and symptoms of infection or wound complications  Outcome: Progressing Towards Goal  Goal: *Optimal pain control at patient's stated goal  Outcome: Progressing Towards Goal  Goal: *Adequate urinary output (equal to or greater than 30 milliliters/hour)  Outcome: Progressing Towards Goal  Goal: *Hemodynamically stable  Outcome: Progressing Towards Goal  Goal: *Tolerating diet  Outcome: Progressing Towards Goal  Goal: *Demonstrates progressive activity  Outcome: Progressing Towards Goal  Goal: *Lungs clear or at baseline  Outcome: Progressing Towards Goal     Problem: Nephrectomy Pathway: Day 3  Goal: Off Pathway (Use only if patient is Off Pathway)  Outcome: Progressing Towards Goal  Goal: Activity/Safety  Outcome: Progressing Towards Goal  Goal: Nutrition/Diet  Outcome: Progressing Towards Goal  Goal: Discharge Planning  Outcome: Progressing Towards Goal  Goal: Medications  Outcome: Progressing Towards Goal  Goal: Respiratory  Outcome: Progressing Towards Goal  Goal: Treatments/Interventions/Procedures  Outcome: Progressing Towards Goal  Goal: Psychosocial  Outcome: Progressing Towards Goal  Goal: *No signs and symptoms of infection or wound complications  Outcome: Progressing Towards Goal  Goal: *Optimal pain control at patient's stated goal  Outcome: Progressing Towards Goal  Goal: *Adequate urinary output (equal to or greater than 30 milliliters/hour)  Outcome: Progressing Towards Goal  Goal: *Hemodynamically stable  Outcome: Progressing Towards Goal  Goal: *Tolerating diet  Outcome: Progressing Towards Goal  Goal: *Demonstrates progressive activity  Outcome: Progressing Towards Goal  Goal: *Lungs clear or at baseline  Outcome: Progressing Towards Goal     Problem: Nephrectomy Pathway: Day 4  Goal: Off Pathway (Use only if patient is Off Pathway)  Outcome: Progressing Towards Goal  Goal: Activity/Safety  Outcome: Progressing Towards Goal  Goal: Nutrition/Diet  Outcome: Progressing Towards Goal  Goal: Discharge Planning  Outcome: Progressing Towards Goal  Goal: Medications  Outcome: Progressing Towards Goal  Goal: Respiratory  Outcome: Progressing Towards Goal  Goal: Treatments/Interventions/Procedures  Outcome: Progressing Towards Goal  Goal: Psychosocial  Outcome: Progressing Towards Goal     Problem: Surgical Pathway: Discharge Outcomes  Goal: *Hemodynamically stable  Outcome: Progressing Towards Goal  Goal: *Lungs clear or at baseline  Outcome: Progressing Towards Goal  Goal: *Demonstrates independent activity or return to baseline  Outcome: Progressing Towards Goal  Goal: *Optimal pain control at patient's stated goal  Outcome: Progressing Towards Goal  Goal: *Verbalizes understanding and describes prescribed diet  Outcome: Progressing Towards Goal  Goal: *Tolerating diet  Outcome: Progressing Towards Goal  Goal: *Verbalizes name, dosage, time, side effects, and number of days to continue medications  Outcome: Progressing Towards Goal  Goal: *No signs and symptoms of infection or wound complications  Outcome: Progressing Towards Goal  Goal: *Anxiety reduced or absent  Outcome: Progressing Towards Goal  Goal: *Understands and describes signs and symptoms to report to providers(Stroke Metric)  Outcome: Progressing Towards Goal  Goal: *Describes follow-up/return visits to physicians  Outcome: Progressing Towards Goal  Goal: *Describes available resources and support systems  Outcome: Progressing Towards Goal

## 2022-02-17 NOTE — PROGRESS NOTES
Patient states that he is urinating. Asked patient if he would not mind to urinate in the urinal so that I can count his I%O. Patient agreed.

## 2022-02-17 NOTE — DISCHARGE SUMMARY
Discharge Summary     Patient: Jim Angulo MRN: [id number]  SSN: [id number]    YOB: 1970  Age: 45 y.o. Sex: male      Allergies: Patient has no known allergies. Admit Date: 2/15/2022    Discharge Date: 2/17/2022     * Admission Diagnoses:  Right renal mass [N28.89]  Renal mass, right [N28.89]     * Discharge Diagnoses:   Hospital Problems as of 2/17/2022 Date Reviewed: 1/24/2022          Codes Class Noted - Resolved POA    Renal mass, right ICD-10-CM: N28.89  ICD-9-CM: 593.9  2/15/2022 - Present Unknown               * Procedures for this admission:   Procedure(s):  RIGHT OPEN PARTIAL NEPHRECTOMY       * Disposition: Home    * Discharged Condition: improved    * Hospital Course:      Cornel Anderson is a 46 yo male with hx of large right renal mass and is s/p right open partial nephrectomy on 2/15/22 by Dr. Jensen Shaw. Cerda/SUDHIR removed on POD 1. Pt voided well. He has ambulated and tolerating food. He will d/c home and RTO as scheduled. Patient Active Problem List   Diagnosis Code    Shoulder impingement syndrome, right M75.41    Shoulder pain, right M25.511    Acute cholecystitis K81.0    Renal mass, right N28.89             Discharge Medications:   Current Discharge Medication List      START taking these medications    Details   senna-docusate (PERICOLACE) 8.6-50 mg per tablet Take 1 Tablet by mouth two (2) times a day. Qty: 20 Tablet, Refills: 0  Start date: 2/17/2022      HYDROcodone-acetaminophen (NORCO) 5-325 mg per tablet Take 1 Tablet by mouth every four (4) hours as needed for Pain for up to 3 days. Max Daily Amount: 6 Tablets. Qty: 15 Tablet, Refills: 0  Start date: 2/17/2022, End date: 2/20/2022    Associated Diagnoses: S/p nephrectomy         STOP taking these medications       oxyCODONE-acetaminophen (Percocet) 5-325 mg per tablet Comments:   Reason for Stopping:                * Follow-up Care/Patient Instructions:   Activity: No heavy lifting, pushing, pulling, avoid straining until reevaluated at follow up appt. Avoid driving while taking pain medication. Recommend colace daily.       Diet: Regular Diet  Wound Care: None needed    Follow-up Information     Follow up With Specialties Details Why Contact Info    Yessi Leigh MD Family Medicine   [address] [facility]  [phone]      Star Denny MD Hematology and Oncology, Urology Go on 3/4/2022 at 11:20 [address]  [phone]

## 2022-02-17 NOTE — PROGRESS NOTES
Patient for discharge today. Patient denies any discharge/supportive care needs at this time. Patient discharged home. Care Management Interventions  PCP Verified by CM:  (Donna Forman MD )  Mode of Transport at Discharge:  Other (see comment) (Family. )  Transition of Care Consult (CM Consult): Discharge Planning  Physical Therapy Consult: No  Occupational Therapy Consult: No  Speech Therapy Consult: No  Support Systems: Other Family Member(s)  Confirm Follow Up Transport: Self  Discharge Location  Patient Expects to be Discharged to[de-identified] Home

## 2022-02-17 NOTE — PROGRESS NOTES
[address] February 17, 2022       RE: Chetna Ellis      To Whom It May Concern,    This is to certify that Chetna Ellis was hospitalized from 2/15/2022 to 2/17/2022. Please feel free to contact my office if you have any questions or concerns. Thank you for your assistance in this matter.       Sincerely,  Chioma Kim RN

## 2022-02-17 NOTE — PROGRESS NOTES
Patient resting in bed. Family member present at bedside. Patient denies needs at this time. Patient denies pain, N/V or SOB. Bed in lowest position. Call light within reach. Will monitor

## 2022-02-18 NOTE — DISCHARGE INSTRUCTIONS
Discharge Instructions/Follow-up Plans:   MD Instructions:     Follow-up with Dr. Trang Ford in 2 weeks. Keep incisions/ steristrips clean and dry, may remain uncovered. Do not apply lotions, creams or ointments to incisions.     Diet - advance as tolerated - to Bariatric diet  Activity - ambulate - as tolerated - no heavy lifting >10lb. May shower - no tub baths or soaking/submerging.     No driving while taking narcotics. Do not drink alcohol while taking narcotics. Resume other home medications. Please watch for fever of 101 or greater, uncontrolled pain or nausea, redness/swelling at incision site please follow up with Dr Trang Ford.      If problems or questions arise, please call our office at [phone]. Patient Education        Colecistectomía: Jorge Galeano en el hogar  Cholecystectomy: What to Expect at Home  Ramírez recuperación  [facility], es normal sentirse débil y fatigado por varios días después de regresar al [facility]. Es posible que tenga el abdomen hinchado. Si le montgomery hecho tara cirugía laparoscópica, también podría sentir dolor en el hombro boris unas 24 horas. Es posible que tenga gases o necesite eructar mucho al principio, y a [address]. La diarrea suele desaparecer en el transcurso de 2 a 4 semanas, vinay podría durar más. El tiempo que tarde en recuperarse depende de si le montgomery hecho tara cirugía laparoscópica o abierta. · En el dannie de [facility] laparoscópica, la mayoría de las personas pueden volver a trabajar o hacer ramírez rutina habitual en 1 a 2 semanas, vinay podría tardar más, según el tipo de trabajo que pascale. · En el dannie de [facility], es probable que tarde de 4 a 6 semanas en poder volver a ramírez rutina habitual.  Esta hoja de [employer] idea general del tiempo que le llevará recuperarse. No obstante, cada persona se recupera a un ritmo diferente.  Siga los pasos que se mencionan a continuación para recuperarse lo más rápido posible. ¿Cómo puede cuidarse en el hogar? Actividad    · Descanse cuando se sienta fatigado. Dormir lo suficiente le ayudará a recuperarse.     · Intente caminar todos los días. Comience caminando un poco más de lo que caminó el día anterior. Aumente en forma gradual la distancia que camina. Caminar aumenta el flujo kobi Fall y [place] Valentine a prevenir la neumonía y el estreñimiento.     · Evite levantar cualquier cosa que implique un esfuerzo boris unas 2 a 4 semanas. [place] podría incluir un roberta, bolsas de las compras y envases de [place] pesados, mochilas o maletines pesados, bolsas de arena para excremento de mauricio o alimentos para perros, o tara aspiradora.     · Evite actividades vigorosas, cain [employer], trotar, levantar pesas y hacer ejercicio aeróbico, hasta que bryant médico lo apruebe.     · Puede ducharse entre 24 y 50 horas después de la [place], si bryant médico lo aprueba. Seque el bailey (incisión) con toques suaves de toalla. No tome jabari de inmersión boris las primeras 2 semanas o hasta que bryant médico lo apruebe.     · Puede conducir cuando ya no esté tomando analgésicos (medicamentos para el dolor) y pueda desplazar con rapidez bryant pie del acelerador al freno. También debe estar en condiciones de poder permanecer sentado con comodidad boris un tiempo keshia, aun si no piensa ir muy lejos. Podría quedar atascado en el tráfico.     · Para la cirugía laparoscópica, la mayoría de las personas pueden volver a trabajar o hacer bryant rutina normal en 1 a 2 semanas, aunque podrían [employer]. En el dannie de [place], es probable que tarde de 4 a 6 semanas en poder volver a bryant rutina habitual.     · Bryant médico le indicará cuándo puede volver a tener relaciones sexuales. Dieta     · Coma cantidades más pequeñas varias veces al día en lugar de pocas veces y en grandes cantidades. Puede seguir tara dieta normal, vinay evite los alimentos grasosos por 1 mes aproximadamente.  Entre los [place] [employer] grasosos se [employer], la [place], [place] y [employer]. Si tiene [employer], coma alimentos suaves bajos en grasa, cain arroz sin condimentar, karely a la maliha, pan chasity y yogur.     · Maame abundantes líquidos (a menos que bryant médico le indique lo contrario).     · Si tiene diarrea, evite los alimentos condimentados, los productos lácteos, los alimentos grasosos y el alcohol. También puede observar si la causa es algún alimento en particular y dejar de comerlo. Si la diarrea [employer] de 2 semanas, hable con bryant médico.     · Podría notar que no evacua el intestino con regularidad sanjeev después de la [place]. [place] es común. Trate de evitar el estreñimiento y de no hacer esfuerzos cuando evacua el intestino. Sería conveniente isabel un suplemento de LOCK8. Si no ha evacuado el intestino después de un par de días, pregúntele a bryant médico si puede isabel un laxante suave. Medicamentos    · Bryant médico le dirá si puede volver a isabel flora medicamentos y cuándo puede volver a hacerlo. También le dará indicaciones sobre cualquier medicamento nuevo que deba isabel usted.     · Si zane aspirina o cualquier otro medicamento que previene los coágulos de [place], pregúntele a bryant médico si debería volver a tomarlo y en qué momento. Asegúrese de entender exactamente lo que bryant médico quiere que pascale.     · [place] los analgésicos exactamente cain le fueron indicados. ? Si el médico le recetó analgésicos, tómelos según las indicaciones. ? Si no está tomando analgésicos recetados, tome abeba de venta wendy, marbin cain acetaminofén (Tylenol), ibuprofeno (Advil, Motrin) o naproxeno (Aleve). Gisselle y siga todas las instrucciones de la [place]. ? No tome dos o más analgésicos al [employer], a menos que el médico se lo haya indicado. Muchos analgésicos contienen acetaminofén, es decir, Tylenol.  El exceso de Tylenol puede ser dañino.     · Si le parece que el analgésico le está produciendo revoltura del estómago:  ? Aline el medicamento después de las comidas (a menos que bryant médico le indique lo contrario). ? Pídale al médico un analgésico diferente.     · Si bryant médico le recetó antibióticos, tómelos según las indicaciones. No deje de tomarlos por el hecho de sentirse mejor. Debe isabel todos los antibióticos hasta terminarlos. Cuidado de la incisión    · Si le montgomery puesto tiras de cinta [employer] incisión, o bailey, déjeselas puestas boris tara semana o hasta que se caigan por sí solas.     · Después de 24 a 48 horas, lave la monika a diario con [place] tibia y séquela con toques suaves de toalla.     · Es posible que tenga grapas para mantener el bailey cerrado. Manténgalas secas hasta que bryant [address] [facility] [address] [place] es por lo general en 7 a 10 días.     · Mantenga la monika limpia y seca. Puede cubrirla con tara venda de gasa si supura o roza contra la ropa. Cambie la venda todos los benito. Hielo     · Para reducir la hinchazón y el dolor, colóquese hielo o tara compresa fría sobre el abdomen boris 10 a 20 minutos cada vez. Edgar esto cada 1 a 2 horas. Póngase un paño de leon entre el hielo y la piel. La atención de seguimiento es tara parte clave de bryant tratamiento y seguridad. Asegúrese de hacer y acudir a todas las citas, y llame a bryant médico si está teniendo problemas. También es tara buena idea saber los resultados de flora exámenes y mantener tara lista de los medicamentos que zane. ¿Cuándo debe pedir ayuda? Llame al 911 en cualquier momento que considere que necesita atención de [place]. Por ejemplo, llame si:    · Se desmayó (perdió el conocimiento).     · Tiene serias dificultades para respirar.     · Tiene dolor repentino en el pecho y falta de [place], o tose luis.    Llame a bryant médico ahora mismo o busque atención médica inmediata si:    · Siente el estómago revuelto y no puede beber líquidos.     · Tiene dolor que no mejora después de isabel analgésicos.     · Tiene señales de infección, tales cain:  ? Aumento del dolor, la hinchazón, el enrojecimiento o la temperatura. ? Vetas rojizas que salen de la incisión. ? Pus que supura de la incisión. ? Ganglios linfáticos inflamados en el rodney, las axilas o la ying. ? Jennifer Nan.     · La orina es de color amarronado oscuro o las heces son de color blessing o del color de la arcilla.     · La piel o la parte cami de los ojos se le ponen amarillentas.     · La venda colocada sobre la incisión se empapa con luis de color quezada vivo.     · Tiene señales de un coágulo de luis, tales cain:  ? Dolor en la pantorrilla, el muslo, la ying o detrás de la rodilla. ? Enrojecimiento e hinchazón en la pierna o la ying.     · Tiene problemas para orinar o evacuar el intestino, en especial si tiene dolor leve o hinchazón en la parte baja del abdomen. Preste especial atención a cualquier cambio en bryant carlee y asegúrese de comunicarse con bryant médico si:    · Le montgomery hecho tara Karilyn Mule y el dolor en el hombro dura más de 24 horas.     · No evacua el intestino después de isabel un laxante. ¿Dónde puede encontrar más información en inglés? Radha José Miguel a http://www.cadena.com/  Terese Milder [id number] en la búsqueda para aprender Marlon Edgar de \"Colecistectomía: Qué esperar en el hogar. \"  Revisado: 10 febrero, 2021               Versión del contenido: 13.0  © 5657-4960 Healthwise, Incorporated. Las instrucciones de cuidado fueron adaptadas bajo licencia por [employer] (which disclaims liability or warranty for this information). Si usted tiene [place] afección médica o sobre estas instrucciones, siempre pregunte a bryant profesional de carlee.  [facility] niega toda garantía o responsabilidad por bryant uso de esta información.              DC instructions

## 2022-02-19 LAB
ABO + RH BLD: NORMAL
BLD PROD TYP BPU: NORMAL
BLD PROD TYP BPU: NORMAL
BLOOD GROUP ANTIBODIES SERPL: NORMAL
BPU ID: NORMAL
BPU ID: NORMAL
CROSSMATCH RESULT,%XM: NORMAL
CROSSMATCH RESULT,%XM: NORMAL
SPECIMEN EXP DATE BLD: NORMAL
STATUS OF UNIT,%ST: NORMAL
STATUS OF UNIT,%ST: NORMAL
UNIT DIVISION, %UDIV: 0
UNIT DIVISION, %UDIV: 0

## 2022-03-04 ENCOUNTER — HOSPITAL ENCOUNTER (OUTPATIENT)
Dept: LAB | Age: 52
Discharge: HOME | End: 2022-03-04
Payer: COMMERCIAL

## 2022-03-04 DIAGNOSIS — N28.89 RENAL MASS, RIGHT: ICD-10-CM

## 2022-03-04 LAB
ANION GAP SERPL CALC-SCNC: 4 MMOL/L (ref 7–16)
BUN SERPL-MCNC: 15 MG/DL (ref 6–23)
CALCIUM SERPL-MCNC: 8.6 MG/DL (ref 8.3–10.4)
CHLORIDE SERPL-SCNC: 111 MMOL/L (ref 98–107)
CO2 SERPL-SCNC: 28 MMOL/L (ref 21–32)
CREAT SERPL-MCNC: 0.9 MG/DL (ref 0.8–1.5)
GLUCOSE SERPL-MCNC: 76 MG/DL (ref 65–100)
POTASSIUM SERPL-SCNC: 4 MMOL/L (ref 3.5–5.1)
SODIUM SERPL-SCNC: 143 MMOL/L (ref 136–145)

## 2022-03-04 PROCEDURE — 80048 BASIC METABOLIC PNL TOTAL CA: CPT

## 2022-03-04 PROCEDURE — 36415 COLL VENOUS BLD VENIPUNCTURE: CPT

## 2022-03-19 PROBLEM — M75.41 SHOULDER IMPINGEMENT SYNDROME, RIGHT: Status: ACTIVE | Noted: 2020-05-14

## 2022-03-19 PROBLEM — N28.89 RENAL MASS, RIGHT: Status: ACTIVE | Noted: 2022-02-15

## 2022-03-19 PROBLEM — K81.0 ACUTE CHOLECYSTITIS: Status: ACTIVE | Noted: 2022-01-18

## 2022-03-20 PROBLEM — M25.511 SHOULDER PAIN, RIGHT: Status: ACTIVE | Noted: 2020-05-20

## 2022-05-04 ENCOUNTER — HOSPITAL ENCOUNTER (OUTPATIENT)
Dept: ULTRASOUND IMAGING | Age: 52
Discharge: HOME | End: 2022-05-04
Attending: UROLOGY
Payer: COMMERCIAL

## 2022-05-04 DIAGNOSIS — N28.89 RENAL MASS, RIGHT: ICD-10-CM

## 2022-05-04 PROCEDURE — 76770 US EXAM ABDO BACK WALL COMP: CPT

## 2022-05-09 ENCOUNTER — HOSPITAL ENCOUNTER (OUTPATIENT)
Dept: LAB | Age: 52
Discharge: HOME | End: 2022-05-09
Payer: COMMERCIAL

## 2022-05-09 DIAGNOSIS — N28.89 RENAL MASS, RIGHT: ICD-10-CM

## 2022-05-09 DIAGNOSIS — N28.89 RENAL MASS, RIGHT: Primary | ICD-10-CM

## 2022-05-09 LAB
ANION GAP SERPL CALC-SCNC: 5 MMOL/L (ref 7–16)
BUN SERPL-MCNC: 16 MG/DL (ref 6–23)
CALCIUM SERPL-MCNC: 8.4 MG/DL (ref 8.3–10.4)
CHLORIDE SERPL-SCNC: 112 MMOL/L (ref 98–107)
CO2 SERPL-SCNC: 27 MMOL/L (ref 21–32)
CREAT SERPL-MCNC: 1 MG/DL (ref 0.8–1.5)
GLUCOSE SERPL-MCNC: 81 MG/DL (ref 65–100)
POTASSIUM SERPL-SCNC: 3.8 MMOL/L (ref 3.5–5.1)
SODIUM SERPL-SCNC: 144 MMOL/L (ref 136–145)

## 2022-05-09 PROCEDURE — 80048 BASIC METABOLIC PNL TOTAL CA: CPT

## 2022-05-09 PROCEDURE — 36415 COLL VENOUS BLD VENIPUNCTURE: CPT

## 2022-05-25 NOTE — PROGRESS NOTES
Catalina Mclean MD   Bariatric & Advanced Laparoscopic Surgery & Endoscopy  [facility] [address], [address] [address]  Phone [phone]   Fax [phone]      Date of visit: 2022          Name: Robert Amaya      MRN: [id number]       : 1970       Age: 45 y.o. Sex: male        PCP: Esmer Coronado MD     CC:    Chief Complaint   Patient presents with    Follow-up     FU - Sleeve 2020        HPI:    1.5 years post-op visit after a Laparoscopic sleeve gastrectomy was done on 2020. He has gained 10 since his last office visit. Weight History Graph    Surgeon: Zaria Chambers   DOS: 2020   Procedure: Sleeve   Pre-op weight: 286   Ideal body weight: 155   Excess body weight: 141     Evaluation of Pre-operative Co-morbid Conditions:    Sleep Apnea - Yes, uses CPAP - Yes      Clinical Assessment and Physical Exam:    He is doing very well today and is feeling good. He reports that he has been adhering to protein first diet without difficulty. He denies any abdominal pain, nausea or vomiting or heartburn. He does not report having problems with constipation. He reports walking more frequently and going to the gym 3x per week for 2 hours. He is concerned with his weight gain despite being able to go to the gym more frequently. He has not had any issues with recent cholecystectomy and nephrectomy. Protein:  90 grams per day  Fluids:    70 ounces per day  Exercise:  Gym 3x per week for 2 hours  No fever or chills. Incisions well healed. Denies reflux. Denies dysphagia. Regular bowel movements. Tolerating Protein first diet without difficulty.       PMH:    Past Medical History:   Diagnosis Date    Chronic pain     Depression     GERD (gastroesophageal reflux disease)     Hypertension     Sleep apnea     C-pap broken, does not use        PSH:    Past Surgical History:   Procedure Laterality Date    CHOLECYSTECTOMY  2022    CHOLECYSTECTOMY, LAPAROSCOPIC  2022    COLONOSCOPY      GASTRIC BYPASS SURGERY  2020    gastric sleeve    GI      OTHER SURGICAL HISTORY      Pt states \"bumps removed from lower back\"     UPPER GASTROINTESTINAL ENDOSCOPY         MEDS:    Current Outpatient Medications   Medication Sig Dispense Refill    senna-docusate (PERICOLACE) 8.6-50 MG per tablet Take 1 tablet by mouth 2 times daily       No current facility-administered medications for this visit. ALLERGIES:      No Known Allergies    SH:    Social History     Tobacco Use    Smoking status: Former Smoker     Quit date: 2016     Years since quittin.9    Smokeless tobacco: Never Used   Substance Use Topics    Alcohol use: Yes    Drug use: Never       FH:    Family History   Problem Relation Age of Onset    Osteoarthritis Mother     No Known Problems Father        Review of systems:  The patient has no difficulty with chest pain or shortness of breath. No fever or chills. Comprehensive 13 point review of systems was otherwise unremarkable except as noted above. Physical Exam:     /79   Pulse 73   Ht 5' 6\" (1.676 m)   Wt 222 lb (100.7 kg)   BMI 35.83 kg/m²     General:  Well-developed, well-nourished, no distress. Psych:  Cooperative, good insight and judgement. Neuro:  Alert, oriented to person, place and time. HEENT:  Normocephalic, atraumatic. Sclera clear. Lungs:  Unlabored breathing. Symmetrical chest expansion. Chest wall:  No tenderness or deformity. Heart:  Regular rate and rhythm. No JVD. Abdomen:  Soft, non-tender, non-distended. No guarding or rebound. Well healed lap incisions. Extremities:  Extremities normal, atraumatic, no cyanosis or edema. Skin:  Skin color, texture, turgor normal. No rashes. Labs: All recent labs were reviewed. Imaging: None        Diagnosis Orders   1. Morbid obesity (HCC)     2. Obesity, Class II, BMI 35-39.9     3.  S/P laparoscopic sleeve gastrectomy 4. Dietary counseling     5. Exercise counseling     6. YOUSIF (obstructive sleep apnea)         Assessment/Plan:  Martinez Martinez is a 45 y.o. male here to follow up s/p Laparoscopic sleeve gastrectomy. He is doing well but has gain some weight and will like to get enrolled in the medical weight loss program. We will order start labs. He is adhering to the diet and we discussed and addressed all his questions. I encouraged him to increase and change physical activity routine and aim for 300 minutes a week and include 2 strength session a week to maintain the weight loss. He will continue the recommended vitamin/mineral supplementation as directed. May consider medical weight loss for additional weight loss. RTC in 6 months       Time: I spent 40 minutes preparing to see patient (including chart review and preparation), obtaining and/or reviewing additional medical history, performing a physical exam and evaluation, documenting clinical information in the electronic health record, independently interpreting results, communicating results to patient, family or caregiver, and/or coordinating care.        Signed: Gernoimo Adhikari MD  Bariatric & Minimally Invasive Surgery  5/26/2022

## 2022-05-26 ENCOUNTER — OFFICE VISIT (OUTPATIENT)
Dept: SURGERY | Age: 52
End: 2022-05-26
Payer: COMMERCIAL

## 2022-05-26 VITALS
HEIGHT: 66 IN | DIASTOLIC BLOOD PRESSURE: 79 MMHG | HEART RATE: 73 BPM | SYSTOLIC BLOOD PRESSURE: 110 MMHG | BODY MASS INDEX: 35.68 KG/M2 | WEIGHT: 222 LBS

## 2022-05-26 DIAGNOSIS — Z71.82 EXERCISE COUNSELING: ICD-10-CM

## 2022-05-26 DIAGNOSIS — G47.33 OSA (OBSTRUCTIVE SLEEP APNEA): ICD-10-CM

## 2022-05-26 DIAGNOSIS — Z71.3 DIETARY COUNSELING: ICD-10-CM

## 2022-05-26 DIAGNOSIS — Z98.84 S/P LAPAROSCOPIC SLEEVE GASTRECTOMY: ICD-10-CM

## 2022-05-26 DIAGNOSIS — E66.01 MORBID OBESITY (HCC): Primary | ICD-10-CM

## 2022-05-26 DIAGNOSIS — E66.9 OBESITY, CLASS II, BMI 35-39.9: ICD-10-CM

## 2022-05-26 PROCEDURE — 99215 OFFICE O/P EST HI 40 MIN: CPT | Performed by: SURGERY

## 2022-07-11 NOTE — PROGRESS NOTES
Jj Bernardo, MS, RD, LD  Surgical Weight Loss Dietitian  [address] [place] [address], Jassi Flores, Katie Price  Phone [phone]   Fax [phone]    Obesity Medicine Initial Nutrition Assessment     Assessment:  Pt presents for consideration of obesity medicine program. Hx of VSG. He reports fair dietary compliance since last office visit. Pt getting 42g protein/d and 64+oz fluid/d. Pt is eating at least 3x/d. Pt is waiting 30 minutes after eating to drink liquids. He is drinking water, occasional soda - 2-3 per week. Pt is exercising yolleyball 1d/week for 60+. Pt is taking MVI and Ca as recommended. 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 Pt reports since these two surgeries, he has experienced increase in appetite and weight regain. Diet Recall:    Breakfast: coffee with sugar, bread   Lunch: salad - tomatoes, pickles, lime   Dinner: soup with noodles   Snacks: none  Fullness level before meals: little hungry  Fullness level after meals: comfortable    Anthropometrics:        Initial Consult Date 7/13/2022   Initial Height 5'6\"   Initial Weight 218lb   IBW 155lb   EBW 63lb   Initial Neck 15.25\"   Initial Waist 48.50\"   Initial %BF 34.0%   REE 1999kcal      Estimated Nutrition Needs:  EEN for weight loss = MSJ x 1.3 - 500kcal/day = 1813kcal/d  Protein: 70-106g/day (1.0-1.5 gm/kg IBW)    Carbohydrate: 204g/day (45%)    Fat: 71g/day (35%)      DIAGNOSIS:  Class II obesity R/T hx of yoyo dieting and excessive energy intake as evidenced by BMI = 35.19 and 141 % IBW. Intervention:   1. Evaluated diet recall and identified modifications. 2. Re-educated pt on mindful eating techniques and macronutrients. 3. Re-educated pt on protein counting and gave handout on lean sources of protein. 4. Encouraged pt to track nutritional intake through a food journal or digital tracking application. 5. Encouraged continued and increased activity. a.  Pt goal: By next visit, pt will exercise 5-6x/week for 60-90mins via walking and volleyball. Monitoring and Evaluation:  1. Monitor for continued safe, supervised weight loss for VSG/OM. 2. Follow for increased protein intake to goal of 60-80g/day  3.  F/U per MD Wong Osorio, MS, RD, LD

## 2022-07-13 ENCOUNTER — OFFICE VISIT (OUTPATIENT)
Dept: SURGERY | Age: 52
End: 2022-07-13
Payer: COMMERCIAL

## 2022-07-13 VITALS — WEIGHT: 218 LBS | BODY MASS INDEX: 35.03 KG/M2 | HEIGHT: 66 IN

## 2022-07-13 DIAGNOSIS — E66.01 MORBID OBESITY (HCC): ICD-10-CM

## 2022-07-13 DIAGNOSIS — G47.33 OSA (OBSTRUCTIVE SLEEP APNEA): ICD-10-CM

## 2022-07-13 DIAGNOSIS — Z71.3 DIETARY COUNSELING: ICD-10-CM

## 2022-07-13 DIAGNOSIS — E66.01 CLASS 2 SEVERE OBESITY DUE TO EXCESS CALORIES WITH SERIOUS COMORBIDITY AND BODY MASS INDEX (BMI) OF 35.0 TO 35.9 IN ADULT (HCC): ICD-10-CM

## 2022-07-13 DIAGNOSIS — Z98.84 S/P LAPAROSCOPIC SLEEVE GASTRECTOMY: Primary | ICD-10-CM

## 2022-07-13 DIAGNOSIS — Z71.82 EXERCISE COUNSELING: ICD-10-CM

## 2022-07-13 DIAGNOSIS — Z98.84 S/P LAPAROSCOPIC SLEEVE GASTRECTOMY: ICD-10-CM

## 2022-07-13 DIAGNOSIS — E66.9 OBESITY, CLASS II, BMI 35-39.9: ICD-10-CM

## 2022-07-13 LAB
ALBUMIN SERPL-MCNC: 4 G/DL (ref 3.5–5)
ALBUMIN/GLOB SERPL: 1.2 {RATIO} (ref 1.2–3.5)
ALP SERPL-CCNC: 79 U/L (ref 50–136)
ALT SERPL-CCNC: 58 U/L (ref 12–65)
ANION GAP SERPL CALC-SCNC: 6 MMOL/L (ref 7–16)
AST SERPL-CCNC: 32 U/L (ref 15–37)
BILIRUB SERPL-MCNC: 0.5 MG/DL (ref 0.2–1.1)
BUN SERPL-MCNC: 18 MG/DL (ref 6–23)
CALCIUM SERPL-MCNC: 8.6 MG/DL (ref 8.3–10.4)
CHLORIDE SERPL-SCNC: 110 MMOL/L (ref 98–107)
CHOLEST SERPL-MCNC: 147 MG/DL
CO2 SERPL-SCNC: 26 MMOL/L (ref 21–32)
CREAT SERPL-MCNC: 0.9 MG/DL (ref 0.8–1.5)
EST. AVERAGE GLUCOSE BLD GHB EST-MCNC: 111 MG/DL
GLOBULIN SER CALC-MCNC: 3.4 G/DL (ref 2.3–3.5)
GLUCOSE SERPL-MCNC: 81 MG/DL (ref 65–100)
HBA1C MFR BLD: 5.5 % (ref 4.8–5.6)
HDLC SERPL-MCNC: 37 MG/DL (ref 40–60)
HDLC SERPL: 4 {RATIO}
LDLC SERPL CALC-MCNC: 86.6 MG/DL
POTASSIUM SERPL-SCNC: 4.1 MMOL/L (ref 3.5–5.1)
PROT SERPL-MCNC: 7.4 G/DL (ref 6.3–8.2)
SODIUM SERPL-SCNC: 142 MMOL/L (ref 136–145)
TRIGL SERPL-MCNC: 117 MG/DL (ref 35–150)
TSH, 3RD GENERATION: 1.22 UIU/ML (ref 0.36–3.74)
URATE SERPL-MCNC: 4.5 MG/DL (ref 2.6–6)
VLDLC SERPL CALC-MCNC: 23.4 MG/DL (ref 6–23)

## 2022-07-13 PROCEDURE — 99215 OFFICE O/P EST HI 40 MIN: CPT | Performed by: SURGERY

## 2022-07-13 NOTE — H&P (VIEW-ONLY)
Martina Montemayor MD   Bariatric & Advanced Laparoscopic Surgery & Endoscopy  [address] [facility] [address], [address] [facility]  Katie Reeder                                                     Office [phone] Fax [phone]        Date of visit: 7/13/2022      History and Physical    Patient: Kylah Wilkins MRN: [id number]     YOB: 1970  Age: 47 y.o. Sex: male              PCP: Socorro Reyes MD   Pharmacy:   [place] [address], [address] [place] [address]  Phone: [phone] Fax: [phone]     Date:  7/13/2022      Kylah Wilkins  who presents to the [facility] for consultation to assist in weight loss. This is his initial consultation preparing him for our multi-disciplinary weight loss program.  He presents with a height of 5' 6\" (1.676 m) and weight of 218 lb (98.9 kg), giving him a Body mass index is 35.19 kg/m². He has an Ideal body weight of 155 lbs, and excess body weight of 63 lbs. Lowest weight 203 lbs  Highest weight 316 lbs  Biggest challenge to weight loss - mindful eating    MEDICAL HISTORY:  Morbid Obesity       Comorbidity Yes or No   Hypertension- how many medications = 0 No   Hyperlipidemia No   Diabetes Mellitus  Insulin dependent = no  Last A1c = ? No   Coronary Artery Disease No   Gastroesophageal Reflux  Treatment Med = No No   Obstructive Sleep Apnea No   Cancer No   Asthma No   Osteoarthritis No   Joint Pain No      He denies dysphagia.        Other Yes or No   Migraines No   Seizures or Epilepsy No   Glaucoma No   Hyperthyroidism No   Irregular Heartbeat or palpitations No   Gastroparesis No   History of gallstones or pancreatitis Yes - s/p cholecystectomy   Malabsorption No   Kidney Stones No - one KIDNEY       Venous Stasis No   Dialysis No   Long term use of steroid or immunocompromised conditions No   Chronic opioids No   On Anticoagulants No     He denies personal or family history of problems with anesthesia, bleeding, or clotting. He denies history of DVT or pulmonary embolism. DENTAL/CHEWING ABILITY:  No dental issues with chewing. PRIOR WEIGHT LOSS ATTEMPTS:  has participated in supervised weight loss. This consists of the following programs:  sleeve gastrectomy on      EXERCISE ASSESSMENT:  He is going to play volleyball on . Reviewed NIH guidelines. PSYCHOSOCIAL:  He notes he is  and states main support person is [employer]. He is employed as a  at [employer]. His goal in pursuing medical weight loss is to improve health. He denies any active psychological problems and reports appropriate treatment of anxiety. He states he is independent in his care, can drive a car, and can ambulate without assistance. HISTORY:  Past Medical History:   Diagnosis Date    Chronic pain     Depression     GERD (gastroesophageal reflux disease)     Hypertension     Sleep apnea     C-pap broken, does not use        Last colonoscopy: never    Past Surgical History:   Procedure Laterality Date    CHOLECYSTECTOMY  2022    CHOLECYSTECTOMY, LAPAROSCOPIC  2022    COLONOSCOPY      GASTRIC BYPASS SURGERY  2020    gastric sleeve    GI      KIDNEY SURGERY Right 02/15/2022    removed tumor     OTHER SURGICAL HISTORY      Pt states \"bumps removed from lower back\"     UPPER GASTROINTESTINAL ENDOSCOPY       Social History     Tobacco Use    Smoking status: Former Smoker     Quit date: 2016     Years since quittin.1    Smokeless tobacco: Never Used   Substance Use Topics    Alcohol use: Yes    Drug use: Never     Family History   Problem Relation Age of Onset    Osteoarthritis Mother     No Known Problems Father        Any family history of Multiple Endocrine Neoplasia Syndrome or Medullary Thyroid Carcinoma?  No     MEDICATIONS:  Current Outpatient Medications   Medication Sig Dispense Refill    Multiple Vitamin (MULTI-VITAMIN DAILY PO) Take by mouth      senna-docusate (PERICOLACE) 8.6-50 MG per tablet Take 1 tablet by mouth 2 times daily (Patient not taking: Reported on 7/13/2022)       No current facility-administered medications for this visit. ALLERGIES:  No Known Allergies    ROS: The patient has no difficulty with chest pain or shortness of breath. No fever or chills. Comprehensive 13 point review of systems was otherwise unremarkable except as noted above. Physical Exam:     Ht 5' 6\" (1.676 m)   Wt 218 lb (98.9 kg)   BMI 35.19 kg/m²        No flowsheet data found. General:  Well-developed, well-nourished, no distress. Psych:  Cooperative, good insight and judgement. Neuro:  Alert, oriented to person, place and time. HEENT:  Normocephalic, atraumatic. Sclera clear. Lungs:  Unlabored breathing. Symmetrical chest expansion. Chest wall:  No tenderness or deformity. Heart:  Regular rate and rhythm. No JVD. Abdomen:  Soft, non-tender, non-distended. No guarding or rebound. No palpable masses. Extremities:  Extremities normal, atraumatic, no cyanosis or edema. Skin:  Skin color, texture, turgor normal. No rashes. ASSESSMENT:  Morbid obesity with a Body mass index is 35.19 kg/m². beginning multi-disciplinary weight loss prep program.    PLAN:  We have discussed the patient's participation and reviewed the steps in our weight loss program. He has had a long history of failed diet and exercise programs and has good understanding about the risks, benefits, and commitment related to medical weight loss. He is interested in proceeding with our program seeking the medical weight loss. Diagnosis Orders   1. S/P laparoscopic sleeve gastrectomy     2. Dietary counseling     3. Exercise counseling     4. YOUSIF (obstructive sleep apnea)     5.  Class 2 severe obesity due to excess calories with serious comorbidity and body mass index (BMI) of 35.0 to 35.9 in adult

## 2022-07-13 NOTE — PROGRESS NOTES
Cassandra Anderson MD   Bariatric & Advanced Laparoscopic Surgery & Endoscopy  [place] [address], [address] [facility]  Katie Reeder                                                     Office [phone] Fax [phone]        Date of visit: 7/13/2022      History and Physical    Patient: Briana Umana MRN: [id number]     YOB: 1970  Age: 47 y.o. Sex: male              PCP: Carolina Caldwell MD   Pharmacy:   [place] [address], [address] [address]  Phone: [phone] Fax: [phone]     Date:  7/13/2022      Briana Umana  who [place] for consultation to assist in weight loss. This is his initial consultation preparing him for our multi-disciplinary weight loss program.  He presents with a height of 5' 6\" (1.676 m) and weight of 218 lb (98.9 kg), giving him a Body mass index is 35.19 kg/m². He has an Ideal body weight of 155 lbs, and excess body weight of 63 lbs. Lowest weight 203 lbs  Highest weight 316 lbs  Biggest challenge to weight loss - mindful eating    MEDICAL HISTORY:  Morbid Obesity       Comorbidity Yes or No   Hypertension- how many medications = 0 No   Hyperlipidemia No   Diabetes Mellitus  Insulin dependent = no  Last A1c = ? No   Coronary Artery Disease No   Gastroesophageal Reflux  Treatment Med = No No   Obstructive Sleep Apnea No   Cancer No   Asthma No   Osteoarthritis No   Joint Pain No      He denies dysphagia.        Other Yes or No   Migraines No   Seizures or Epilepsy No   Glaucoma No   Hyperthyroidism No   Irregular Heartbeat or palpitations No   Gastroparesis No   History of gallstones or pancreatitis Yes - s/p cholecystectomy   Malabsorption No   Kidney Stones No - one KIDNEY       Venous Stasis No   Dialysis No   Long term use of steroid or immunocompromised conditions No   Chronic opioids No   On Anticoagulants No     He denies personal or family history of problems with anesthesia, bleeding, or clotting. He denies history of DVT or pulmonary embolism. DENTAL/CHEWING ABILITY:  No dental issues with chewing. PRIOR WEIGHT LOSS ATTEMPTS:  has participated in supervised weight loss. This consists of the following programs:  sleeve gastrectomy on      EXERCISE ASSESSMENT:  He is going to play volleyball on . Reviewed NIH guidelines. PSYCHOSOCIAL:  He notes he is  and states main support person is [employer]. He is employed as a  at [employer]. His goal in pursuing medical weight loss is to improve health. He denies any active psychological problems and reports appropriate treatment of anxiety. He states he is independent in his care, can drive a car, and can ambulate without assistance. HISTORY:  Past Medical History:   Diagnosis Date    Chronic pain     Depression     GERD (gastroesophageal reflux disease)     Hypertension     Sleep apnea     C-pap broken, does not use        Last colonoscopy: never    Past Surgical History:   Procedure Laterality Date    CHOLECYSTECTOMY  2022    CHOLECYSTECTOMY, LAPAROSCOPIC  2022    COLONOSCOPY      GASTRIC BYPASS SURGERY  2020    gastric sleeve    GI      KIDNEY SURGERY Right 02/15/2022    removed tumor     OTHER SURGICAL HISTORY      Pt states \"bumps removed from lower back\"     UPPER GASTROINTESTINAL ENDOSCOPY       Social History     Tobacco Use    Smoking status: Former Smoker     Quit date: 2016     Years since quittin.1    Smokeless tobacco: Never Used   Substance Use Topics    Alcohol use: Yes    Drug use: Never     Family History   Problem Relation Age of Onset    Osteoarthritis Mother     No Known Problems Father        Any family history of Multiple Endocrine Neoplasia Syndrome or Medullary Thyroid Carcinoma?  No     MEDICATIONS:  Current Outpatient Medications   Medication Sig Dispense Refill    Multiple Vitamin (MULTI-VITAMIN DAILY PO) Take by mouth      senna-docusate (PERICOLACE) 8.6-50 MG per tablet Take 1 tablet by mouth 2 times daily (Patient not taking: Reported on 7/13/2022)       No current facility-administered medications for this visit. ALLERGIES:  No Known Allergies    ROS: The patient has no difficulty with chest pain or shortness of breath. No fever or chills. Comprehensive 13 point review of systems was otherwise unremarkable except as noted above. Physical Exam:     Ht 5' 6\" (1.676 m)   Wt 218 lb (98.9 kg)   BMI 35.19 kg/m²        No flowsheet data found. General:  Well-developed, well-nourished, no distress. Psych:  Cooperative, good insight and judgement. Neuro:  Alert, oriented to person, place and time. HEENT:  Normocephalic, atraumatic. Sclera clear. Lungs:  Unlabored breathing. Symmetrical chest expansion. Chest wall:  No tenderness or deformity. Heart:  Regular rate and rhythm. No JVD. Abdomen:  Soft, non-tender, non-distended. No guarding or rebound. No palpable masses. Extremities:  Extremities normal, atraumatic, no cyanosis or edema. Skin:  Skin color, texture, turgor normal. No rashes. ASSESSMENT:  Morbid obesity with a Body mass index is 35.19 kg/m². beginning multi-disciplinary weight loss prep program.    PLAN:  We have discussed the patient's participation and reviewed the steps in our weight loss program. He has had a long history of failed diet and exercise programs and has good understanding about the risks, benefits, and commitment related to medical weight loss. He is interested in proceeding with our program seeking the medical weight loss. Diagnosis Orders   1. S/P laparoscopic sleeve gastrectomy     2. Dietary counseling     3. Exercise counseling     4. YOUSIF (obstructive sleep apnea)     5.  Class 2 severe obesity due to excess calories with serious comorbidity and body mass index (BMI) of 35.0 to 35.9 in adult ([facility]         1. Discussed in detail our comprehensive weight loss program as well as the incorporation of medications. We also discussed that the participation in our program is elective. The patient verbalized and demonstrated a clear understanding of what to expect. 2.  Patient is an excellent candidate with a clear medical necessity for weight loss. 3.  Encouraged patient to participate in our Bariatric Support Group. 4.  Advised that weight loss medication is only one part of a lifelong weight management program, and that sustainable weight loss will only come with major changes in eating habits, physical activity and behavior. Advised that obesity is a chronic disease and requires a commitment to self-management and long-term follow up. 5.  Nutrition Recommendations: please see dietitian notes. 6.  Exercise Recommendations: Exercise routine, incorporating both cardio and strength training, building up to 5x/wk for at least 300 minutes a week. He will complete the following steps:  1. Complete blood work. 2.  Participation in our behavior modification program, reduced calorie diet program, and participation in our exercise program recommendations supervised by our office. 3.  Reminded of policy of no weight gain during prep period. 4.  EKG      He will return after the above are complete for assessment of his progress in 2-4 weeks and we will discuss the medication of choice. Time: I spent 60 minutes preparing to see patient (including chart review and preparation), obtaining and/or reviewing additional medical history, performing a physical exam and evaluation, documenting clinical information in the electronic health record, independently interpreting results, communicating results to patient, family or caregiver, and/or coordinating care.         Signed: Boston Jacobsen MD  Bariatric & Minimally Invasive Surgery  7/13/2022

## 2022-07-14 LAB
25(OH)D3 SERPL-MCNC: 37.5 NG/ML (ref 30–100)
BASOPHILS # BLD: 0.1 K/UL (ref 0–0.2)
BASOPHILS NFR BLD: 1 % (ref 0–2)
DIFFERENTIAL METHOD BLD: ABNORMAL
EOSINOPHIL # BLD: 0.1 K/UL (ref 0–0.8)
EOSINOPHIL NFR BLD: 2 % (ref 0.5–7.8)
ERYTHROCYTE [DISTWIDTH] IN BLOOD BY AUTOMATED COUNT: 15 % (ref 11.9–14.6)
HCT VFR BLD AUTO: 42.7 % (ref 41.1–50.3)
HGB BLD-MCNC: 13.7 G/DL (ref 13.6–17.2)
IMM GRANULOCYTES # BLD AUTO: 0 K/UL (ref 0–0.5)
IMM GRANULOCYTES NFR BLD AUTO: 1 % (ref 0–5)
LYMPHOCYTES # BLD: 1.8 K/UL (ref 0.5–4.6)
LYMPHOCYTES NFR BLD: 28 % (ref 13–44)
MCH RBC QN AUTO: 25.9 PG (ref 26.1–32.9)
MCHC RBC AUTO-ENTMCNC: 32.1 G/DL (ref 31.4–35)
MCV RBC AUTO: 80.9 FL (ref 79.6–97.8)
MONOCYTES # BLD: 0.5 K/UL (ref 0.1–1.3)
MONOCYTES NFR BLD: 7 % (ref 4–12)
NEUTS SEG # BLD: 4 K/UL (ref 1.7–8.2)
NEUTS SEG NFR BLD: 61 % (ref 43–78)
NRBC # BLD: 0 K/UL (ref 0–0.2)
PLATELET # BLD AUTO: 183 K/UL (ref 150–450)
PMV BLD AUTO: 12.1 FL (ref 9.4–12.3)
RBC # BLD AUTO: 5.28 M/UL (ref 4.23–5.6)
WBC # BLD AUTO: 6.4 K/UL (ref 4.3–11.1)

## 2022-07-25 ENCOUNTER — ANESTHESIA EVENT (OUTPATIENT)
Dept: ENDOSCOPY | Age: 52
End: 2022-07-25
Payer: COMMERCIAL

## 2022-07-25 ENCOUNTER — ANESTHESIA (OUTPATIENT)
Dept: ENDOSCOPY | Age: 52
End: 2022-07-25
Payer: COMMERCIAL

## 2022-07-25 ENCOUNTER — HOSPITAL ENCOUNTER (OUTPATIENT)
Age: 52
Setting detail: OUTPATIENT SURGERY
Discharge: HOME | End: 2022-07-25
Attending: SURGERY | Admitting: SURGERY
Payer: COMMERCIAL

## 2022-07-25 VITALS
OXYGEN SATURATION: 95 % | HEART RATE: 61 BPM | HEIGHT: 66 IN | RESPIRATION RATE: 18 BRPM | DIASTOLIC BLOOD PRESSURE: 69 MMHG | SYSTOLIC BLOOD PRESSURE: 119 MMHG | BODY MASS INDEX: 35.36 KG/M2 | WEIGHT: 220 LBS | TEMPERATURE: 98 F

## 2022-07-25 PROCEDURE — 3700000000 HC ANESTHESIA ATTENDED CARE: Performed by: SURGERY

## 2022-07-25 PROCEDURE — 2500000003 HC RX 250 WO HCPCS: Performed by: ANESTHESIOLOGY

## 2022-07-25 PROCEDURE — 6360000002 HC RX W HCPCS: Performed by: REGISTERED NURSE

## 2022-07-25 PROCEDURE — 2709999900 HC NON-CHARGEABLE SUPPLY: Performed by: SURGERY

## 2022-07-25 PROCEDURE — 7100000011 HC PHASE II RECOVERY - ADDTL 15 MIN: Performed by: SURGERY

## 2022-07-25 PROCEDURE — 2580000003 HC RX 258: Performed by: ANESTHESIOLOGY

## 2022-07-25 PROCEDURE — 7100000010 HC PHASE II RECOVERY - FIRST 15 MIN: Performed by: SURGERY

## 2022-07-25 PROCEDURE — 3700000001 HC ADD 15 MINUTES (ANESTHESIA): Performed by: SURGERY

## 2022-07-25 PROCEDURE — 2500000003 HC RX 250 WO HCPCS: Performed by: REGISTERED NURSE

## 2022-07-25 PROCEDURE — 3609027000 HC COLONOSCOPY: Performed by: SURGERY

## 2022-07-25 PROCEDURE — 45378 DIAGNOSTIC COLONOSCOPY: CPT | Performed by: SURGERY

## 2022-07-25 RX ORDER — SODIUM CHLORIDE, SODIUM LACTATE, POTASSIUM CHLORIDE, CALCIUM CHLORIDE 600; 310; 30; 20 MG/100ML; MG/100ML; MG/100ML; MG/100ML
INJECTION, SOLUTION INTRAVENOUS CONTINUOUS
Status: DISCONTINUED | OUTPATIENT
Start: 2022-07-25 | End: 2022-07-25 | Stop reason: HOSPADM

## 2022-07-25 RX ORDER — EPHEDRINE SULFATE/0.9% NACL/PF 50 MG/5 ML
SYRINGE (ML) INTRAVENOUS PRN
Status: DISCONTINUED | OUTPATIENT
Start: 2022-07-25 | End: 2022-07-25 | Stop reason: SDUPTHER

## 2022-07-25 RX ORDER — LIDOCAINE HYDROCHLORIDE 10 MG/ML
1 INJECTION, SOLUTION INFILTRATION; PERINEURAL
Status: COMPLETED | OUTPATIENT
Start: 2022-07-25 | End: 2022-07-25

## 2022-07-25 RX ORDER — PROPOFOL 10 MG/ML
INJECTION, EMULSION INTRAVENOUS PRN
Status: DISCONTINUED | OUTPATIENT
Start: 2022-07-25 | End: 2022-07-25 | Stop reason: SDUPTHER

## 2022-07-25 RX ORDER — LIDOCAINE HYDROCHLORIDE 20 MG/ML
INJECTION, SOLUTION EPIDURAL; INFILTRATION; INTRACAUDAL; PERINEURAL PRN
Status: DISCONTINUED | OUTPATIENT
Start: 2022-07-25 | End: 2022-07-25 | Stop reason: SDUPTHER

## 2022-07-25 RX ADMIN — LIDOCAINE HYDROCHLORIDE 1 ML: 10 INJECTION, SOLUTION INFILTRATION; PERINEURAL at 07:57

## 2022-07-25 RX ADMIN — Medication 5 MG: at 08:51

## 2022-07-25 RX ADMIN — PROPOFOL 100 MG: 10 INJECTION, EMULSION INTRAVENOUS at 08:38

## 2022-07-25 RX ADMIN — SODIUM CHLORIDE, POTASSIUM CHLORIDE, SODIUM LACTATE AND CALCIUM CHLORIDE: 600; 310; 30; 20 INJECTION, SOLUTION INTRAVENOUS at 07:58

## 2022-07-25 RX ADMIN — PROPOFOL 75 MG: 10 INJECTION, EMULSION INTRAVENOUS at 08:52

## 2022-07-25 RX ADMIN — Medication 10 MG: at 08:43

## 2022-07-25 RX ADMIN — PROPOFOL 160 MCG/KG/MIN: 10 INJECTION, EMULSION INTRAVENOUS at 08:39

## 2022-07-25 RX ADMIN — Medication 30 MG: at 08:38

## 2022-07-25 ASSESSMENT — PAIN - FUNCTIONAL ASSESSMENT: PAIN_FUNCTIONAL_ASSESSMENT: 0-10

## 2022-07-25 NOTE — ANESTHESIA POSTPROCEDURE EVALUATION
Department of Anesthesiology  Postprocedure Note    Patient: Arpita Deutsch  MRN: [id number]  YOB: 1970  Date of evaluation: 7/25/2022      Procedure Summary     Date: 07/25/22 Room / Location: [facility] ENDO 01 / [facility] ENDOSCOPY    Anesthesia Start: 0835 Anesthesia Stop: 2908    Procedure: COLORECTAL CANCER SCREENING, NOT HIGH RISK/ BMI 35/ Swedish Diagnosis:       Colon cancer screening      (Colon cancer screening [Z12.11])    Providers: Mandeep Goldstein MD Responsible Provider: Michelle Alba MD    Anesthesia Type: TIVA ASA Status: 2          Anesthesia Type: No value filed. Shahida Phase I:      Shahida Phase II: Shahida Score: 10      Anesthesia Post Evaluation    Patient location during evaluation: PACU  Patient participation: complete - patient participated  Level of consciousness: awake and alert  Airway patency: patent  Nausea: well controlled. Complications: no  Cardiovascular status: acceptable.   Respiratory status: acceptable  Hydration status: stable

## 2022-07-25 NOTE — ANESTHESIA PRE PROCEDURE
Department of Anesthesiology  Preprocedure Note       Name:  Emanuel Scott   Age:  47 y.o.  :  1970                                          MRN:  [id number]         Date:  2022      Surgeon: Brooke Wesley):  Jason Vickers MD    Procedure: Procedure(s):  COLORECTAL CANCER SCREENING, NOT HIGH RISK/ BMI 35/ Mauritanian    Medications prior to admission:   Prior to Admission medications    Medication Sig Start Date End Date Taking?  Authorizing Provider   Multiple Vitamin (MULTI-VITAMIN DAILY PO) Take by mouth    Historical Provider, MD   senna-docusate (PERICOLACE) 8.6-50 MG per tablet Take 1 tablet by mouth 2 times daily  Patient not taking: Reported on 2022   Ar Automatic Reconciliation       Current medications:    Current Facility-Administered Medications   Medication Dose Route Frequency Provider Last Rate Last Admin    lidocaine 1 % injection 1 mL  1 mL IntraDERmal Once PRN Brodie Nyhan, MD        lactated ringers infusion   IntraVENous Continuous Brodie Nyhan, MD           Allergies:  No Known Allergies    Problem List:    Patient Active Problem List   Diagnosis Code    Acute cholecystitis K81.0    Renal mass, right N28.89    Shoulder impingement syndrome, right M75.41    Shoulder pain, right M25.511       Past Medical History:        Diagnosis Date    Chronic pain     Depression     GERD (gastroesophageal reflux disease)     Hypertension     Sleep apnea     C-pap broken, does not use        Past Surgical History:        Procedure Laterality Date    CHOLECYSTECTOMY  2022    CHOLECYSTECTOMY, LAPAROSCOPIC  2022    COLONOSCOPY      GASTRIC BYPASS SURGERY  2020    gastric sleeve    GI      KIDNEY SURGERY Right 02/15/2022    removed tumor     OTHER SURGICAL HISTORY      Pt states \"bumps removed from lower back\"     UPPER GASTROINTESTINAL ENDOSCOPY         Social History:    Social History     Tobacco Use    Smoking status: Former Types: Cigarettes     Quit date: 2016     Years since quittin.1    Smokeless tobacco: Never   Substance Use Topics    Alcohol use: Yes                                Counseling given: Not Answered      Vital Signs (Current):   Vitals:    22 07   BP: 123/74   Pulse: 56   Resp: 16   Temp: 97.2 °F (36.2 °C)   TempSrc: Temporal   SpO2: 97%   Weight: 220 lb (99.8 kg)   Height: 5' 6\" (1.676 m)                                              BP Readings from Last 3 Encounters:   22 123/74   22 110/79   22 125/73       NPO Status:                                                                                 BMI:   Wt Readings from Last 3 Encounters:   22 220 lb (99.8 kg)   22 218 lb (98.9 kg)   22 222 lb (100.7 kg)     Body mass index is 35.51 kg/m². CBC:   Lab Results   Component Value Date/Time    WBC 6.4 2022 02:03 PM    RBC 5.28 2022 02:03 PM    HGB 13.7 2022 02:03 PM    HCT 42.7 2022 02:03 PM    MCV 80.9 2022 02:03 PM    RDW 15.0 2022 02:03 PM     2022 02:03 PM       CMP:   Lab Results   Component Value Date/Time     2022 02:03 PM    K 4.1 2022 02:03 PM     2022 02:03 PM    CO2 26 2022 02:03 PM    BUN 18 2022 02:03 PM    CREATININE 0.90 2022 02:03 PM    GFRAA >60 2022 02:03 PM    AGRATIO 0.9 2022 07:29 PM    LABGLOM >60 2022 02:03 PM    GLUCOSE 81 2022 02:03 PM    PROT 7.4 2022 02:03 PM    CALCIUM 8.6 2022 02:03 PM    BILITOT 0.5 2022 02:03 PM    ALKPHOS 79 2022 02:03 PM    ALKPHOS 72 2022 07:29 PM    AST 32 2022 02:03 PM    ALT 58 2022 02:03 PM       POC Tests: No results for input(s): POCGLU, POCNA, POCK, POCCL, POCBUN, POCHEMO, POCHCT in the last 72 hours.     Coags: No results found for: PROTIME, INR, APTT    HCG (If Applicable): No results found for: PREGTESTUR, PREGSERUM, HCG, HCGQUANT     ABGs: No results found for: PHART, PO2ART, UNI2OSM, JXZ4PKK, BEART, X0DBSNKK     Type & Screen (If Applicable):  No results found for: LABABO, LABRH    Drug/Infectious Status (If Applicable):  No results found for: HIV, HEPCAB    COVID-19 Screening (If Applicable):   Lab Results   Component Value Date/Time    COVID19 Not Detected 02/11/2022 07:12 AM    COVID19 Performed 02/11/2022 07:12 AM           Anesthesia Evaluation  Patient summary reviewed and Nursing notes reviewed no history of anesthetic complications:   Airway: Mallampati: II  TM distance: >3 FB   Neck ROM: full  Mouth opening: > = 3 FB   Dental:    (+) partials      Pulmonary: breath sounds clear to auscultation  (+) sleep apnea:                             Cardiovascular:  Exercise tolerance: good (>4 METS), Denied chest pain, SOB, syncope   (+) hypertension:, hyperlipidemia        Rhythm: regular  Rate: normal                    Neuro/Psych:   (+) psychiatric history:depression/anxiety             GI/Hepatic/Renal:   (+) GERD:,          ROS comment: H/o gastric bypass    H/o renal mass s/p partial nephrectomy . Endo/Other:    (+) : arthritis:., .                 Abdominal:             Vascular: negative vascular ROS. Other Findings:           Anesthesia Plan      TIVA     ASA 2       Induction: intravenous. Anesthetic plan and risks discussed with patient.                         Anne Marie Suarez MD   7/25/2022

## 2022-07-25 NOTE — DISCHARGE INSTRUCTIONS
Gastrointestinal Colonoscopy/Flexible Sigmoidoscopy - Lower Exam Discharge Instructions    Call Dr. Darryn Garcia at [phone] for any problems or questions. Contact the doctors office for follow up appointment as directed  Medication may cause drowsiness for several hours, therefore:  Do not drive or operate machinery for reminder of the day. No alcohol today. Do not make any important or legal decisions for 24 hours. Do not sign any legal documents for 24 hours. Ordinarily, you may resume regular diet and activity after exam unless otherwise specified by your physician. Because of air put into your colon during exam, you may experience some abdominal distension, relieved by the passage of gas, for several hours. Contact your physician if you have any of the following:  Excessive amount of bleeding - large amount when having a bowel movement. Occasional specks of blood with bowel movement would not be unusual.  Severe abdominal pain  Fever or Chills  Any additional instructions:    Repeat in 10 years. Instructions given to Walker Pastora and other family members.

## 2022-07-25 NOTE — OP NOTE
Colonoscopy Procedure Note    Date of procedure: 2022      Name: Milla Tran      MRN: [id number]       : 1970       Age: 47 y.o. Sex: male    Preoperative Diagnosis: 1. CRC screening          Postoperative Diagnosis: 1. same      2. Diverticulosis    Procedure(s) with comments:  COLORECTAL CANCER SCREENING, NOT HIGH RISK/ BMI 35/ Arabic - Colonoscopy     Procedure in Detail:  Informed consent was obtained for the procedure. The patient was placed in the left lateral decubitus position and sedation was induced by anesthesia. The [id number] was inserted into the rectum and advanced under direct vision to the cecum, which was identified by the ileocecal valve and appendiceal orifice. The quality of the colonic preparation was good. A careful inspection was made as the colonoscope was withdrawn, including a retroflexed view of the rectum; findings and interventions are described below. Appropriate photodocumentation was obtained. Rectum:   normal  Sigmoid:       -Diverticulosis  Descending Colon:   normal  Transverse Colon:   normal  Ascending Colon:   normal  Cecum:   normal      Specimens: No specimens were collected. * No specimens in log *     Complications: None; patient tolerated the procedure well. EBL - minimal    Recommendations:   - For colon cancer screening in this average-risk patient, colonoscopy may be repeated in 10 years.      Signed By: Aguilar Manuel MD  [facility] - Bariatric & Minimally Invasive Surgery  2022 8:56 AM

## 2022-07-25 NOTE — PROGRESS NOTES
VSS. Patient denies any complaints at this time. Discharge education provided to patient and wife using the  service. Patient discharged out via wheelchair with RN.

## 2022-08-02 NOTE — PROGRESS NOTES
Sarah Beltre, MS, RD, LD  Surgical Weight Loss Dietitian  [address], [address] [facility]  [place]  Phone [phone]   Fax [phone]    OBESITY MEDICINE NUTRITION REASSESSMENT     Assessment:  BW: 219# (+1#, visit 2 of obesity medicine weight loss program)  Pt reports good dietary compliance since last office visit. Pt getting ~21-35g protein/d and 48oz fluid/d. Pt is eating at least 3x/d. Pt reports good compliance with mindful eating behaviors. Pt is waiting 30 minutes after eating to drink liquids. He is drinking coffee, water. Pt is exercising via volleyball 1d/wk for  minutes and walking 40-45 minutes daily. Diet Recall:              Breakfast: scrambled egg               Lunch: chicken soup              Dinner: salad with chicken              Drinks: coffee, water      Medication:  Phentermine/Topiramate  Medication Start date: ordered today     Measurement Flowsheet        Initial Consult Date 7/13/2022   Initial Height 5'6\"   Initial Weight 218lb   IBW 155lb   EBW 63lb   Initial Neck 15.25\"   Initial Waist 48.50\"   Initial %BF 34.0%      F/U Date 8/9/2022   Weight 219 lb   Weight Change +1 lb   Neck 15.0 \"   Neck Change -0.25 \"   Waist 48.5 \"   Waist Change -0 \"   %BF Not assessed today   %BF Change N/a         Nutrition Diagnosis:   Class II obesity R/T hx of excessive energy intake as evidenced by BMI = 35.35 and 141% IBW. --ongoing, modified--BMI and %IBW adjusted to reflect weight gain--  Inadequate protein intake R/T appropriate amount of dietary protein as evidenced by pt s/p VSG and consuming 21-35g Pro/d. Inadequate fluid intake R/T fluid recommendations as evidenced by pt s/p VSG and consuming ~48 fl oz daily. Ongoing Goals: Pt will structure meals and change food choices to facilitate 5-10% weight loss within duration of OM treatment. Action Steps:           1. Pt will track PO intake in food log          2.  Pt will practice with mindful eating techniques          3. Pt will select nutrient vs energy dense foods      Intervention:   Evaluated diet recall/ food journal and identified modifications. Encouraged lean protein focus  Encouraged incorporation of non-starchy vegetables  Encouraged use of protein supplements as snacks and not meals  Encouraged continued use of food journal for tracking diet intake  Encouraged increased intake of hydrating fluids to goal of 64+ fl oz daily  Encouraged focus of lean protein with all meals to goal of 60-80g daily  Educated pt on slowing down at mealtimes, practicing mindful eating behaviors. Encouraged continued and increased activity. Pt goal: By next visit, pt will continue current exercise routine and increase as tolerated. Monitoring and Evaluation:  Monitor for continued safe, supervised weight loss for VSG/OM.     F/U per MD Christopher Brink, MS, RD, LD

## 2022-08-09 ENCOUNTER — OFFICE VISIT (OUTPATIENT)
Dept: SURGERY | Age: 52
End: 2022-08-09
Payer: COMMERCIAL

## 2022-08-09 VITALS
HEIGHT: 66 IN | HEART RATE: 64 BPM | DIASTOLIC BLOOD PRESSURE: 78 MMHG | WEIGHT: 219 LBS | SYSTOLIC BLOOD PRESSURE: 171 MMHG | BODY MASS INDEX: 35.2 KG/M2

## 2022-08-09 DIAGNOSIS — G47.33 OSA (OBSTRUCTIVE SLEEP APNEA): ICD-10-CM

## 2022-08-09 DIAGNOSIS — E66.01 CLASS 2 SEVERE OBESITY DUE TO EXCESS CALORIES WITH SERIOUS COMORBIDITY AND BODY MASS INDEX (BMI) OF 35.0 TO 35.9 IN ADULT (HCC): ICD-10-CM

## 2022-08-09 DIAGNOSIS — E66.01 MORBID OBESITY (HCC): Primary | ICD-10-CM

## 2022-08-09 DIAGNOSIS — Z71.82 EXERCISE COUNSELING: ICD-10-CM

## 2022-08-09 DIAGNOSIS — Z98.84 S/P LAPAROSCOPIC SLEEVE GASTRECTOMY: ICD-10-CM

## 2022-08-09 DIAGNOSIS — Z71.3 DIETARY COUNSELING: ICD-10-CM

## 2022-08-09 PROCEDURE — 99215 OFFICE O/P EST HI 40 MIN: CPT | Performed by: SURGERY

## 2022-08-09 NOTE — PROGRESS NOTES
Michael Sierra MD   Bariatric & Advanced Laparoscopic Surgery & Endoscopy  [address], [place]                          Office [phone] Fax [phone]        Date of visit: 8/9/2022      History and Physical    Patient: Graham Cruz MRN: [id number]     YOB: 1970  Age: 47 y.o. Sex: male              PCP: Cara Urrutia MD   Pharmacy:   [facility] [address] [facility] [address]  [address] [address]  Phone: [phone] Fax: [phone]     Date:  8/9/2022    Graham Cruz  who presents to the [facility] for follow up after initial consultation to assist in weight loss. We will review her blood work results, medication options and discuss further our multi-disciplinary weight loss program.      He presents with a height of 5' 6\" (1.676 m) and weight of 219 lb (99.3 kg), giving him a Body mass index is 35.35 kg/m². Triston Argueta No flowsheet data found. Initial Consult Date 7/13/2022   Initial Weight 218   Ideal Body Weight 155   Initial BMI 35.19   Initial Neck Circumference 15.25   Initial Waist Circumference 48.50   Initial BF% 34       Medication TBD   Medication Start Date TBD       Today's Weight 219 lb (99.3 kg)   Today's BMI Body mass index is 35.35 kg/m². Today's Neck Circumference 15   Today's Waist 48.5   Today's BF% Not measured       Lowest weight 203 lbs  Highest weight 316 lbs  Biggest challenge to weight loss - mindful eating    MEDICAL HISTORY:  Morbid Obesity       Comorbidity Yes or No   Hypertension- how many medications = 0 No   Hyperlipidemia No   Diabetes Mellitus  Insulin dependent = no  Last A1c = ? No   Coronary Artery Disease No   Gastroesophageal Reflux  Treatment Med = No No   Obstructive Sleep Apnea No   Cancer No   Asthma No   Osteoarthritis No   Joint Pain No      He denies dysphagia.        Other Yes or No   Migraines No Seizures or Epilepsy No   Glaucoma No   Hyperthyroidism No   Irregular Heartbeat or palpitations No   Gastroparesis No   History of gallstones or pancreatitis Yes - s/p cholecystectomy   Malabsorption No   Kidney Stones No - one KIDNEY       Venous Stasis No   Dialysis No   Long term use of steroid or immunocompromised conditions No   Chronic opioids No   On Anticoagulants No     He denies personal or family history of problems with anesthesia, bleeding, or clotting. He denies history of DVT or pulmonary embolism. DENTAL/CHEWING ABILITY:  No dental issues with chewing. PRIOR WEIGHT LOSS ATTEMPTS:  has participated in supervised weight loss. This consists of the following programs:  sleeve gastrectomy on 2020      EXERCISE ASSESSMENT:  He is going to play volleyball on Fridays. Reviewed NIH guidelines. PSYCHOSOCIAL:  He notes he is  and states main support person is [employer]. He is employed as a  at [employer]. His goal in pursuing medical weight loss is to improve health. He denies any active psychological problems and reports appropriate treatment of anxiety. He states he is independent in his care, can drive a car, and can ambulate without assistance.     HISTORY:  Past Medical History:   Diagnosis Date    Chronic pain     Depression     GERD (gastroesophageal reflux disease)     Hypertension     Sleep apnea     C-pap broken, does not use        Last colonoscopy: never    Past Surgical History:   Procedure Laterality Date    CHOLECYSTECTOMY  01/18/2022    CHOLECYSTECTOMY, LAPAROSCOPIC  01/18/2022    COLONOSCOPY      COLONOSCOPY N/A 7/25/2022    COLORECTAL CANCER SCREENING, NOT HIGH RISK/ BMI 35/ [place] performed by Prashanth Bernal MD at [address]  12/22/2020    gastric sleeve    GI      KIDNEY SURGERY Right 02/15/2022    removed tumor     OTHER SURGICAL HISTORY      Pt states \"bumps removed from lower back\"     UPPER GASTROINTESTINAL ENDOSCOPY       Social History     Tobacco Use    Smoking status: Former     Types: Cigarettes     Quit date: 2016     Years since quittin.1    Smokeless tobacco: Never   Substance Use Topics    Alcohol use: Yes    Drug use: Never     Family History   Problem Relation Age of Onset    Osteoarthritis Mother     No Known Problems Father        Any family history of Multiple Endocrine Neoplasia Syndrome or Medullary Thyroid Carcinoma? No     MEDICATIONS:  Current Outpatient Medications   Medication Sig Dispense Refill    Semaglutide,0.25 or 0.5MG/DOS, 2 MG/1.5ML SOPN Inject 0.25 mg into the skin once a week 1 pen 0    Multiple Vitamin (MULTI-VITAMIN DAILY PO) Take by mouth      senna-docusate (PERICOLACE) 8.6-50 MG per tablet Take 1 tablet by mouth 2 times daily (Patient not taking: No sig reported)       No current facility-administered medications for this visit. ALLERGIES:  No Known Allergies    ROS: The patient has no difficulty with chest pain or shortness of breath. No fever or chills. Comprehensive 13 point review of systems was otherwise unremarkable except as noted above. Physical Exam:     BP (!) 171/78   Pulse 64   Ht 5' 6\" (1.676 m)   Wt 219 lb (99.3 kg)   BMI 35.35 kg/m²        No flowsheet data found. General:  Well-developed, well-nourished, no distress. Psych:  Cooperative, good insight and judgement. Neuro:  Alert, oriented to person, place and time. HEENT:  Normocephalic, atraumatic. Sclera clear. Lungs:  Unlabored breathing. Symmetrical chest expansion. Chest wall:  No tenderness or deformity. Heart:  Regular rate and rhythm. No JVD. Extremities:  Extremities normal, atraumatic, no cyanosis or edema. Skin:  Skin color, texture, turgor normal. No rashes. ASSESSMENT:  Morbid obesity with a Body mass index is 35.35 kg/m².  following up in our multi-disciplinary weight loss prep program.    PLAN:  We have discussed the patient's participation and reviewed the steps in our weight loss program. He has had a long history of failed diet and exercise programs and has good understanding about the risks, benefits, and commitment related to medical weight loss. He is interested in proceeding with our program seeking the incorporation of medication for medical weight loss to assist with his co morbidities. Diagnosis Orders   1. Morbid obesity ([address]        2. S/P laparoscopic sleeve gastrectomy        3. Exercise counseling        4. Dietary counseling        5. YOUSIF (obstructive sleep apnea)        6. Class 2 severe obesity due to excess calories with serious comorbidity and body mass index (BMI) of 35.0 to 35.9 in adult [facility])              1.  We discussed his labs results. Low HDL. The rest of the labs were unremarkable. 2. We discussed his medications options. He agreed to proceed with starting semaglutide. I went over risks and benefits of each medication at length. The patient verbalized and demonstrated a clear understanding of what to expect. Semaglutide helps decrease hunger, increase energy and metabolic rate. Side effects include nausea, vomiting, diarrhea, abdominal pain and pancreatitis. 3.  Patient is an excellent candidate with a clear medical necessity for weight loss. 4.  Encouraged patient to participate in our Bariatric Support Group. 5.  Advised that weight loss medication is only one part of a lifelong weight management program, and that sustainable weight loss will only come with major changes in eating habits, physical activity and behavior. Advised that obesity is a chronic disease and requires a commitment to self-management and long-term follow up. 6.  Nutrition Recommendations: please see dietitian notes. 7.  Exercise Recommendations: Exercise routine, incorporating both cardio and strength training, building up to 5x/wk for at least 300 minutes a week. She will complete the following steps:  1.   Start medication semaglutide 0.25 mg.  2.  Participation in our behavior modification program, reduced calorie diet program, and participation in our exercise program recommendations supervised by our office. FU in 2 weeks to discuss any side effects of medications and script refills. Time: I spent 40 minutes preparing to see patient (including chart review and preparation), obtaining and/or reviewing additional medical history, performing a physical exam and evaluation, documenting clinical information in the electronic health record, independently interpreting results, communicating results to patient, family or caregiver, and/or coordinating care.         Signed: Buddy Tirado MD  Bariatric & Minimally Invasive Surgery  8/9/2022

## 2022-09-13 ENCOUNTER — OFFICE VISIT (OUTPATIENT)
Dept: SURGERY | Age: 52
End: 2022-09-13
Payer: COMMERCIAL

## 2022-09-13 VITALS
SYSTOLIC BLOOD PRESSURE: 157 MMHG | BODY MASS INDEX: 35.36 KG/M2 | WEIGHT: 220 LBS | HEART RATE: 70 BPM | DIASTOLIC BLOOD PRESSURE: 85 MMHG | HEIGHT: 66 IN

## 2022-09-13 DIAGNOSIS — Z98.84 S/P LAPAROSCOPIC SLEEVE GASTRECTOMY: Primary | ICD-10-CM

## 2022-09-13 DIAGNOSIS — Z71.3 DIETARY COUNSELING AND SURVEILLANCE: ICD-10-CM

## 2022-09-13 DIAGNOSIS — Z71.82 EXERCISE COUNSELING: ICD-10-CM

## 2022-09-13 DIAGNOSIS — Z71.3 DIETARY COUNSELING: ICD-10-CM

## 2022-09-13 DIAGNOSIS — E66.01 CLASS 2 SEVERE OBESITY DUE TO EXCESS CALORIES WITH SERIOUS COMORBIDITY AND BODY MASS INDEX (BMI) OF 35.0 TO 35.9 IN ADULT (HCC): ICD-10-CM

## 2022-09-13 PROCEDURE — 99214 OFFICE O/P EST MOD 30 MIN: CPT | Performed by: SURGERY

## 2022-09-13 NOTE — PROGRESS NOTES
Dayne Bergeron MD   Bariatric & Advanced Laparoscopic Surgery & Endoscopy  [address] [place] [address] 2050, [address]32 [facility]  Katie Hardin                          Office [phone] Fax [phone]        Date of visit: 9/13/2022      History and Physical    Patient: Micah Treviño MRN: [id number]     YOB: 1970  Age: 47 y.o. Sex: male              PCP: Chago Arnett MD   Pharmacy:   [place] [id number], [facility] [address] [address]  Phone: [phone] Fax: [phone]     Date:  9/13/2022    4 week(s) post medication start  He has gained, 1 lbs. since his last office visit. No flowsheet data found. He has been doing well. He reports having some heartburn. He also denies any nausea, vomiting and abdominal pain. He also reports decreased appetite. Clinical Assessment and Physical Exam:    he is doing very well today and is feeling good. He reports that he has been adhering to the protein first diet without difficulty. He denies any tachycardia, abdominal pain, nausea or vomiting. He does not report having problems with constipation. Protein:  ? grams per day  Fluids:    48 ounces per day  Physical Activity:  3x/week dancing and 1x/wk play volleyball     No flowsheet data found. Initial Consult Date 7/13/2022   Initial Weight 218   Ideal Body Weight 155   Initial BMI 35.19   Initial Neck Circumference 15.25   Initial Waist Circumference 48.50   Initial BF% 34       Medication Semaglutide   Medication Start Date 8/19/2022       Today's Weight 220 lb (99.8 kg)   Today's BMI Body mass index is 35.51 kg/m².     Today's Neck Circumference 15   Today's Waist 48.5   Today's BF% Not measured     Evaluation of Co-morbid Conditions  Sleep Apnea No, uses CPAP- NA   Diabetes No, insulin dependent- NA   Hypertension No, number of medications- 0   GERD No, treatment medication- NA use        Last colonoscopy: never    Past Surgical History:   Procedure Laterality Date    CHOLECYSTECTOMY  2022    CHOLECYSTECTOMY, LAPAROSCOPIC  2022    COLONOSCOPY      COLONOSCOPY N/A 2022    COLORECTAL CANCER SCREENING, NOT HIGH RISK/ BMI 35/ [place] performed by Kirstin Pacheco MD at [address]  2020    gastric sleeve    GI      KIDNEY SURGERY Right 02/15/2022    removed tumor     OTHER SURGICAL HISTORY      Pt states \"bumps removed from lower back\"     UPPER GASTROINTESTINAL ENDOSCOPY       Social History     Tobacco Use    Smoking status: Former     Types: Cigarettes     Quit date: 2016     Years since quittin.2    Smokeless tobacco: Never   Substance Use Topics    Alcohol use: Yes    Drug use: Never     Family History   Problem Relation Age of Onset    Osteoarthritis Mother     No Known Problems Father        Any family history of Multiple Endocrine Neoplasia Syndrome or Medullary Thyroid Carcinoma? No     MEDICATIONS:  Current Outpatient Medications   Medication Sig Dispense Refill    Semaglutide,0.25 or 0.5MG/DOS, 2 MG/1.5ML SOPN Inject 0.5 mg into the skin once a week 1 Adjustable Dose Pre-filled Pen Syringe 0    Multiple Vitamin (MULTI-VITAMIN DAILY PO) Take by mouth      senna-docusate (PERICOLACE) 8.6-50 MG per tablet Take 1 tablet by mouth 2 times daily (Patient not taking: No sig reported)       No current facility-administered medications for this visit. ALLERGIES:  No Known Allergies    ROS: The patient has no difficulty with chest pain or shortness of breath. No fever or chills. Comprehensive 13 point review of systems was otherwise unremarkable except as noted above. Physical Exam:     BP (!) 157/85   Pulse 70   Ht 5' 6\" (1.676 m)   Wt 220 lb (99.8 kg)   BMI 35.51 kg/m²        No flowsheet data found. General:  Well-developed, well-nourished, no distress.   Psych:  Cooperative, good insight and judgement. Neuro:  Alert, oriented to person, place and time. HEENT:  Normocephalic, atraumatic. Sclera clear. Lungs:  Unlabored breathing. Symmetrical chest expansion. Chest wall:  No tenderness or deformity. Heart:  Regular rate and rhythm. No JVD. Extremities:  Extremities normal, atraumatic, no cyanosis or edema. Skin:  Skin color, texture, turgor normal. No rashes. ASSESSMENT:  Morbid obesity with a Body mass index is Body mass index is 35.51 kg/m². . following up in our multi-disciplinary weight loss program. He is very happy with the result. He is very motivated with the lifestyle changes he has incorporated. PLAN:    He is interested in continuing with the medication treatment for weight loss to assist with his co morbidities. Diagnosis Orders   1. S/P laparoscopic sleeve gastrectomy        2. Exercise counseling        3. Dietary counseling        4. Class 2 severe obesity due to excess calories with serious comorbidity and body mass index (BMI) of 35.0 to 35.9 in adult ([facility] [address]        5. Dietary counseling and surveillance               1.  We discussed any symptoms he has experienced since starting the medications and none seem significant to stop the medication. he disires to continue the medication and and continue to monitor closely. 2. We discussed again risks and benefits of their medication and he agreed to continue. Semaglutide helps decrease hunger, increase energy and metabolic rate. Side effects include nausea, vomiting, diarrhea, abdominal pain and pancreatitis. The patient verbalized and demonstrated a clear understanding of what to expect moving forward. 3.  Patient continues to be an excellent candidate with a clear medical necessity for weight loss. 4.  Encouraged patient to participate in our Bariatric Support Group.     5.  Advised that weight loss medication is only one part of a lifelong weight management program, and that sustainable weight loss will only come with major changes in eating habits, physical activity and behavior. Advised that obesity is a chronic disease and requires a commitment to self-management and long-term follow up. 6.  Nutrition Recommendations: please see dietitian notes. 7.  Exercise Recommendations: Exercise routine, incorporating both cardio and strength training, building up to 5x/wk for at least 300 minutes a week and include 2 strength session a week. Refills sent today: semaglutide 0.50    FU in 1 month(s) to discuss any side effects of medications and script refills. Time: I spent 40 minutes preparing to see patient (including chart review and preparation), obtaining and/or reviewing additional medical history, performing a physical exam and evaluation, documenting clinical information in the electronic health record, independently interpreting results, communicating results to patient, family or caregiver, and/or coordinating care.       Alicia Pelayo MD  Bariatric & Minimally Invasive Surgery  [facility]  9/13/2022 2:11 PM        Signed: Alicia Pelayo MD  Bariatric & Minimally Invasive Surgery  9/13/2022

## 2022-09-19 ENCOUNTER — HOSPITAL ENCOUNTER (OUTPATIENT)
Dept: CT IMAGING | Age: 52
Discharge: HOME | End: 2022-09-22
Payer: COMMERCIAL

## 2022-09-19 ENCOUNTER — TELEPHONE (OUTPATIENT)
Dept: ONCOLOGY | Age: 52
End: 2022-09-19

## 2022-09-19 DIAGNOSIS — N28.89 RENAL MASS, RIGHT: ICD-10-CM

## 2022-09-19 LAB — CREAT BLD-MCNC: 0.66 MG/DL (ref 0.8–1.5)

## 2022-09-19 PROCEDURE — 74177 CT ABD & PELVIS W/CONTRAST: CPT

## 2022-09-19 PROCEDURE — 6360000004 HC RX CONTRAST MEDICATION: Performed by: UROLOGY

## 2022-09-19 PROCEDURE — 82565 ASSAY OF CREATININE: CPT

## 2022-09-19 RX ADMIN — IOPAMIDOL 100 ML: 755 INJECTION, SOLUTION INTRAVENOUS at 09:43

## 2022-09-22 DIAGNOSIS — N28.89 RENAL MASS, RIGHT: Primary | ICD-10-CM

## 2022-10-03 ENCOUNTER — TELEPHONE (OUTPATIENT)
Dept: ONCOLOGY | Age: 52
End: 2022-10-03

## 2022-10-04 NOTE — PERIOP NOTES
Lidocaine drip pump settings confirmed at Ideal body weight: 61.5 kg (135 lb 9.3 oz). Infusing at 15.3 ml/hour.
NAC.  Tolerated anesthetic well.
TRANSFER - OUT REPORT:    Verbal report given to [address] on [facility]  being transferred to room 337 for routine post - op       Report consisted of patients Situation, Background, Assessment and   Recommendations(SBAR). Information from the following report(s) SBAR, Kardex, OR Summary, Procedure Summary, Intake/Output and MAR was reviewed with the receiving nurse. Lines:   Peripheral IV 12/22/20 Right Wrist (Active)   Site Assessment Clean, dry, & intact 12/22/20 1055   Phlebitis Assessment 0 12/22/20 1055   Infiltration Assessment 0 12/22/20 1055   Dressing Status Clean, dry, & intact 12/22/20 1055   Dressing Type Transparent 12/22/20 1055   Hub Color/Line Status Green; Infusing 12/22/20 1055        Opportunity for questions and clarification was provided. Patient transported with:   Tech   2L oxygen NC    VTE prophylaxis orders have been written for [facility]. Patient and family given floor number and nurses name. Family updated re: pt status after security code verified.
[phone]

## 2022-10-12 NOTE — PROGRESS NOTES
Gely Tracy, MS, RD, LD  Surgical Weight Loss Dietitian  [address] [place] [address]  Katie Reeder  Phone [phone]   Fax [phone]    OBESITY MEDICINE NUTRITION REASSESSMENT     Assessment:  BW: 212# (-8#, visit 4 of obesity medicine weight loss program)  Pt reports good dietary compliance since last office visit. Pt getting ~60g protein/d and 64oz fluid/d. Pt is eating at least 3x/d. Pt reports good compliance with mindful eating behaviors. Pt is waiting 30 minutes after eating to drink liquids. He is drinking water, coffee. Pt is exercising via walking, volleyball, dancing 5-6d/wk for 90 minutes. Diet Recall:              Breakfast: coffee with bread              Lunch: chicken soup              Dinner: 1 slice pizza              Drinks: water and coffee      Medication: Semaglutide  Medication Start date: 8/19/2022     Measurement Flowsheet        Initial Consult Date 7/13/2022   Initial Height 5'6\"   Initial Weight 218lb   IBW 155lb   EBW 63lb   Initial Neck 15.25\"   Initial Waist 48.50\"   Initial %BF 34.0%      F/U Date 10/13/2022   Weight 212 lb   Weight Change -6 lb   Neck 15.0 \"   Neck Change -0.25 \"   Waist 43.5 \"   Waist Change -5.0 \"   %BF 33.4%   %BF Change -0.6%         Nutrition Diagnosis:   Class I obesity R/T hx of excessive energy intake as evidenced by BMI = 34.22 and 137% IBW. --ongoing, modified--BMI and %IBW adjusted to reflect weight loss--     Ongoing Goals: Pt will structure meals and change food choices to facilitate 5-10% weight loss within duration of OM treatment. Action Steps:           1. Pt will track PO intake in food log          2. Pt will practice with mindful eating techniques          3. Pt will select nutrient vs energy dense foods      Intervention:   Evaluated diet recall/ food journal and identified modifications.   Encouraged lean protein focus  Encouraged incorporation of non-starchy vegetables  Encouraged use of protein supplements as snacks and not meals  Encouraged continued use of food journal for tracking diet intake  Educated pt on slowing down at mealtimes, practicing mindful eating behaviors. Encouraged continued and increased activity. Pt goal: By next visit, pt will exercise 5-6x/week for 60+mins via dancing, volleyball, walking. Monitoring and Evaluation:  Monitor for continued safe, supervised weight loss for VSG/OM.     F/U per MD Faustino Verma, MS, RD, LD

## 2022-10-13 ENCOUNTER — OFFICE VISIT (OUTPATIENT)
Dept: SURGERY | Age: 52
End: 2022-10-13
Payer: COMMERCIAL

## 2022-10-13 VITALS
DIASTOLIC BLOOD PRESSURE: 84 MMHG | HEIGHT: 66 IN | BODY MASS INDEX: 34.07 KG/M2 | WEIGHT: 212 LBS | SYSTOLIC BLOOD PRESSURE: 134 MMHG | HEART RATE: 74 BPM

## 2022-10-13 DIAGNOSIS — Z71.82 EXERCISE COUNSELING: ICD-10-CM

## 2022-10-13 DIAGNOSIS — Z71.3 DIETARY COUNSELING AND SURVEILLANCE: ICD-10-CM

## 2022-10-13 DIAGNOSIS — E66.01 CLASS 2 SEVERE OBESITY DUE TO EXCESS CALORIES WITH SERIOUS COMORBIDITY AND BODY MASS INDEX (BMI) OF 35.0 TO 35.9 IN ADULT (HCC): Primary | ICD-10-CM

## 2022-10-13 DIAGNOSIS — Z98.84 S/P LAPAROSCOPIC SLEEVE GASTRECTOMY: ICD-10-CM

## 2022-10-13 PROCEDURE — 99215 OFFICE O/P EST HI 40 MIN: CPT | Performed by: SURGERY

## 2022-10-13 NOTE — PROGRESS NOTES
Bard Rui MD   Bariatric & Advanced Laparoscopic Surgery & Endoscopy  [address] [facility] [address], [address] [facility]  Katie Reeder                          Office [phone] Fax [phone]        Date of visit: 10/13/2022      History and Physical    Patient: Shaquille Montaño MRN: [id number]     YOB: 1970  Age: 47 y.o. Sex: male              PCP: Harmony Patel MD   Pharmacy:   [facility], [address] [facility] [address]  [address] [address]  Phone: [phone] Fax: [phone]     Date:  10/13/2022    2 months post medication start  He has lost,  8 lbs. since his last office visit. No flowsheet data found. He has been doing well. He also denies any nausea, vomiting and abdominal pain. He also reports decreased appetite. Clinical Assessment and Physical Exam:    he is doing very well today and is feeling good. He reports that he has been adhering to the protein first diet without difficulty. He denies any tachycardia, abdominal pain, nausea or vomiting. He does not report having problems with constipation. Protein:  6 grams per day  Fluids:    70 ounces per day  Physical Activity:  2-3x/week walking for 1 hour, volleyball     No flowsheet data found. Initial Consult Date 7/13/2022   Initial Weight 218   Ideal Body Weight 155   Initial BMI 35.19   Initial Neck Circumference 15.25   Initial Waist Circumference 48.50   Initial BF% 34       Medication Semaglutide   Medication Start Date 8/19/2022       Today's Weight 212 lb (96.2 kg)   Today's BMI Body mass index is 34.22 kg/m².     Today's Neck Circumference 15   Today's Waist 43.5   Today's BF% Not measured     Evaluation of Co-morbid Conditions  Sleep Apnea No, uses CPAP- NA   Diabetes No, insulin dependent- NA   Hypertension No, number of medications- 0   GERD No, treatment medication- NA   Hyperlipidemia No       Lowest weight 203 lbs  Highest weight 316 lbs  Biggest challenge to weight loss - mindful eating    MEDICAL HISTORY:  Morbid Obesity       Comorbidity Yes or No   Hypertension- how many medications = 0 No   Hyperlipidemia No   Diabetes Mellitus  Insulin dependent = no  Last A1c = ? No   Coronary Artery Disease No   Gastroesophageal Reflux  Treatment Med = No No   Obstructive Sleep Apnea No   Cancer No   Asthma No   Osteoarthritis No   Joint Pain No      He denies dysphagia. Other Yes or No   Migraines No   Seizures or Epilepsy No   Glaucoma No   Hyperthyroidism No   Irregular Heartbeat or palpitations No   Gastroparesis No   History of gallstones or pancreatitis Yes - s/p cholecystectomy   Malabsorption No   Kidney Stones No - one KIDNEY       Venous Stasis No   Dialysis No   Long term use of steroid or immunocompromised conditions No   Chronic opioids No   On Anticoagulants No     He denies personal or family history of problems with anesthesia, bleeding, or clotting. He denies history of DVT or pulmonary embolism. DENTAL/CHEWING ABILITY:  No dental issues with chewing. PRIOR WEIGHT LOSS ATTEMPTS:  has participated in supervised weight loss. This consists of the following programs:  sleeve gastrectomy on 2020      EXERCISE ASSESSMENT:  He is going to play volleyball on Fridays. Reviewed NIH guidelines. PSYCHOSOCIAL:  He notes he is  and states main support person is [employer]. He is employed as a  at [employer]. His goal in pursuing medical weight loss is to improve health. He denies any active psychological problems and reports appropriate treatment of anxiety. He states he is independent in his care, can drive a car, and can ambulate without assistance.     HISTORY:  Past Medical History:   Diagnosis Date    Chronic pain     Depression     GERD (gastroesophageal reflux disease)     Hypertension     Sleep apnea     C-pap broken, does not use        Last colonoscopy: never    Past Surgical History:   Procedure Laterality Date    CHOLECYSTECTOMY  2022    CHOLECYSTECTOMY, LAPAROSCOPIC  2022    COLONOSCOPY      COLONOSCOPY N/A 2022    COLORECTAL CANCER SCREENING, NOT HIGH RISK/ BMI 35/ [place] performed by Italo Bryan MD at [address]  2020    gastric sleeve    GI      KIDNEY SURGERY Right 02/15/2022    removed tumor     OTHER SURGICAL HISTORY      Pt states \"bumps removed from lower back\"     UPPER GASTROINTESTINAL ENDOSCOPY       Social History     Tobacco Use    Smoking status: Former     Types: Cigarettes     Quit date: 2016     Years since quittin.3    Smokeless tobacco: Never   Substance Use Topics    Alcohol use: Yes    Drug use: Never     Family History   Problem Relation Age of Onset    Osteoarthritis Mother     No Known Problems Father        Any family history of Multiple Endocrine Neoplasia Syndrome or Medullary Thyroid Carcinoma? No     MEDICATIONS:  Current Outpatient Medications   Medication Sig Dispense Refill    Semaglutide, 1 MG/DOSE, 4 MG/3ML SOPN Inject 1 mg into the skin once a week 3 mL 0    Semaglutide,0.25 or 0.5MG/DOS, 2 MG/1.5ML SOPN Inject 0.5 mg into the skin once a week 1 Adjustable Dose Pre-filled Pen Syringe 0    Multiple Vitamin (MULTI-VITAMIN DAILY PO) Take by mouth      senna-docusate (PERICOLACE) 8.6-50 MG per tablet Take 1 tablet by mouth 2 times daily (Patient not taking: No sig reported)       No current facility-administered medications for this visit. ALLERGIES:  No Known Allergies    ROS: The patient has no difficulty with chest pain or shortness of breath. No fever or chills. Comprehensive 13 point review of systems was otherwise unremarkable except as noted above. Physical Exam:     /84   Pulse 74   Ht 5' 6\" (1.676 m)   Wt 212 lb (96.2 kg)   BMI 34.22 kg/m²        No flowsheet data found.       General:  Well-developed, well-nourished, no distress. Psych:  Cooperative, good insight and judgement. Neuro:  Alert, oriented to person, place and time. HEENT:  Normocephalic, atraumatic. Sclera clear. Lungs:  Unlabored breathing. Symmetrical chest expansion. Chest wall:  No tenderness or deformity. Heart:  Regular rate and rhythm. No JVD. Extremities:  Extremities normal, atraumatic, no cyanosis or edema. Skin:  Skin color, texture, turgor normal. No rashes. ASSESSMENT:  Morbid obesity with a Body mass index is Body mass index is 34.22 kg/m². . following up in our multi-disciplinary weight loss program. He is very happy with the result. He is very motivated with the lifestyle changes he has incorporated. PLAN:    He is interested in continuing with the medication treatment for weight loss to assist with his co morbidities. Diagnosis Orders   1. Class 2 severe obesity due to excess calories with serious comorbidity and body mass index (BMI) of 35.0 to 35.9 in [facility])        2. S/P laparoscopic sleeve gastrectomy        3. Exercise counseling        4. Dietary counseling and surveillance            1. We discussed any symptoms he has experienced since starting the medications and none seem significant to stop the medication. he disires to continue the medication and and continue to monitor closely. 2. We discussed again risks and benefits of their medication and he agreed to continue. Semaglutide helps decrease hunger, increase energy and metabolic rate. Side effects include nausea, vomiting, diarrhea, abdominal pain and pancreatitis. The patient verbalized and demonstrated a clear understanding of what to expect moving forward. 3.  Patient continues to be an excellent candidate with a clear medical necessity for weight loss. 4.  Encouraged patient to participate in our Bariatric Support Group.     5.  Advised that weight loss medication is only one part of a lifelong weight management program, and that sustainable weight loss will only come with major changes in eating habits, physical activity and behavior. Advised that obesity is a chronic disease and requires a commitment to self-management and long-term follow up. 6.  Nutrition Recommendations: please see dietitian notes. 7.  Exercise Recommendations: Exercise routine, incorporating both cardio and strength training, building up to 5x/wk for at least 300 minutes a week and include 2 strength session a week. Refills sent today: semaglutide 1mg    FU in 1 month(s) to discuss any side effects of medications and script refills. Time: I spent 40 minutes preparing to see patient (including chart review and preparation), obtaining and/or reviewing additional medical history, performing a physical exam and evaluation, documenting clinical information in the electronic health record, independently interpreting results, communicating results to patient, family or caregiver, and/or coordinating care.       Ju Wilkerson MD  Bariatric & Minimally Invasive Surgery  [facility]  10/13/2022 11:34 AM        Signed: Ju Wilkerson MD  Bariatric & Minimally Invasive Surgery  10/13/2022

## 2022-11-01 DIAGNOSIS — N28.89 RENAL MASS, RIGHT: Primary | ICD-10-CM

## 2022-11-01 NOTE — PROGRESS NOTES
skin tenderness around his incision. From previous note:  He returns today for 2-week follow-up after his right partial nephrectomy. He has no complaints. His energy levels are good. His bowel movements are normal.  He denies any lower extremity edema or calf tenderness. BMP from today shows creatinine of 0.9. Of note an  helped with this visit. Past medical, family and social histories, as well as medications and allergies, were reviewed and updated in the medical record as appropriate. PMH:     Past Medical History:   Diagnosis Date    Chronic pain     Depression     GERD (gastroesophageal reflux disease)     Hypertension     Sleep apnea     C-pap broken, does not use        MEDs:     MULTI-VITAMIN DAILY PO  Semaglutide (1 MG/DOSE) Sopn  senna-docusate     ALLERGIES:    No Known Allergies    ROS:     Review of Systems   Constitutional: Negative. Negative for chills, fatigue and fever. Respiratory: Negative. Cardiovascular: Negative. Gastrointestinal: Negative. Genitourinary: Negative. Negative for difficulty urinating, dysuria, flank pain, frequency, hematuria and urgency. Musculoskeletal: Negative. All other systems reviewed and are negative. PHYSICAL EXAMINATION    /78   Pulse 81   Temp 98.9 °F (37.2 °C) (Oral)   Resp 16   Ht 5' 6\" (1.676 m)   Wt 210 lb 11.2 oz (95.6 kg)   SpO2 97%   BMI 34.01 kg/m²   General: well dressed, well nourished, no acute distress  Skin: no rashes  HEENT: Sclera are clear,normocephalic, atraumatic. no external lesions   Cardiovascular: Reg. Normal perfusion  Respiratory: normal respiratory effort, no JVD, no audible wheezing. Musculoskeletal: unremarkable with normal function. No embolic signs or cyanosis.    Neurologic exam: intact, no focal deficits, moves all 4 extremities  Psych: normal mood and affect, alert, oriented x 3  LE:  no edema  GI: soft, nontender, no masses, no CVA tenderness  : DEFERRED LABORATORY RESULTS:    Bmp pending    IMAGING:    XR Results:    === 07/26/21 ===    XR CHEST STANDARD TWO VW    - Narrative -  Chest X-ray    INDICATION: Central chest pain    PA and lateral views of the chest were obtained. FINDINGS: The lungs are clear. There are no infiltrates or effusions. The heart  size is normal.  The bony thorax is intact. - Impression -  No acute findings in the chest      CT Results:    === 09/19/22 ===    CT CHEST ABDOMEN PELVIS W CONTRAST    - Narrative -  CT of the Chest, Abdomen, and Pelvis    INDICATION: Right renal cell carcinoma    Multiple axial images were obtained through the chest, abdomen, and pelvis. Oral contrast was used for bowel opacification. 100mL of Isovue 370 intravenous  contrast was used for better evaluation of solid organs and vascular structures. Radiation dose reduction techniques were used for this study. All CT scans  performed at this facility use one or all of the following: Automated exposure  control, adjustment of the mA and/or kVp according to patient's size, iterative  reconstruction. COMPARISON: 1/19/2022 and 2/3/2022    FINDINGS:  - LUNGS: No infiltrates, masses, or effusions.  - MEDIASTINUM/AXILLA: No significant adenopathy.  - HEART/VESSELS: Normal.  - CHEST WALL: Normal.    - LIVER: New area of ill-defined low-attenuation in the superior liver, probably  segment 4A. No other liver lesions.  - GALLBLADDER/BILE DUCTS: Gallbladder is absent. No bile duct dilatation.  - PANCREAS: Normal.  - SPLEEN: Several stable small low-attenuation lesions in the spleen, likely  benign given stability.    - ADRENALS:  Stable 5.2 cm fat density left adrenal myelolipoma. Right adrenal  gland is unremarkable. - KIDNEYS/URETERS: Post resection of the exophytic right renal mass. No locally  recurrent tumor. Left kidney is unremarkable. No hydronephrosis. - BLADDER: Normal.  - REPRODUCTIVE ORGANS: No pelvic masses. - BOWEL: Normal caliber. No inflammatory changes.  - LYMPH NODES: No significant retroperitoneal, mesenteric, or pelvic adenopathy.  - BONES: No fracture or significant bone lesion.  - VASCULATURE: Normal  - OTHER: No ascites. - Impression -  1. Post resection of the right renal mass. No evidence of locally recurrent  tumor. 2.  Developing low attenuation lesion in the liver, most likely focal fatty  change but metastatic disease is not excluded. Liver protocol MRI is  recommended. 310    If there are any questions about this report, I can be reached on [employer]  or at [phone]        ASSESSMENT:    Mr. Adán Funk is a 47 y.o. 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 He is status post right open partial nephrectomy on 2/15/2022. His pathology was reviewed at [facility] and this was thought to be a low-grade oncocytic neoplasm. Patient also has a known 5 cm left adrenal myolipoma under observation. I see no evidence of disease recurrence today. Patient is doing well. I like to see him back in 6 months with a CMP and a CT scan of the chest abdomen pelvis with IV contrast only. He will contact us if he develops any new symptoms prior to that appointment. PLAN:   Review scan  -bmp today  -rtc in 6 months with repeat scan, labs and ov  ________________________________________      I have seen and examined this patient. I have reviewed and edited the note started by the MA and agree with the outlined plan. Part of this note was written by using a voice dictation software. The note has been proof read but may still contain some grammatical/other typographical errors.       Luis Lundy  Urology

## 2022-11-07 ENCOUNTER — OFFICE VISIT (OUTPATIENT)
Dept: ONCOLOGY | Age: 52
End: 2022-11-07
Payer: COMMERCIAL

## 2022-11-07 VITALS
DIASTOLIC BLOOD PRESSURE: 78 MMHG | OXYGEN SATURATION: 97 % | SYSTOLIC BLOOD PRESSURE: 124 MMHG | HEART RATE: 81 BPM | BODY MASS INDEX: 33.86 KG/M2 | WEIGHT: 210.7 LBS | TEMPERATURE: 98.9 F | RESPIRATION RATE: 16 BRPM | HEIGHT: 66 IN

## 2022-11-07 DIAGNOSIS — N28.89 RENAL MASS, RIGHT: Primary | ICD-10-CM

## 2022-11-07 PROCEDURE — 99213 OFFICE O/P EST LOW 20 MIN: CPT | Performed by: UROLOGY

## 2022-11-07 ASSESSMENT — PATIENT HEALTH QUESTIONNAIRE - PHQ9
SUM OF ALL RESPONSES TO PHQ QUESTIONS 1-9: 0
SUM OF ALL RESPONSES TO PHQ QUESTIONS 1-9: 0
2. FEELING DOWN, DEPRESSED OR HOPELESS: 0
1. LITTLE INTEREST OR PLEASURE IN DOING THINGS: 0
SUM OF ALL RESPONSES TO PHQ QUESTIONS 1-9: 0
SUM OF ALL RESPONSES TO PHQ QUESTIONS 1-9: 0
SUM OF ALL RESPONSES TO PHQ9 QUESTIONS 1 & 2: 0

## 2022-11-07 ASSESSMENT — ENCOUNTER SYMPTOMS
RESPIRATORY NEGATIVE: 1
GASTROINTESTINAL NEGATIVE: 1

## 2022-11-07 NOTE — PATIENT INSTRUCTIONS
Patient Instructions from Today's Visit    Reason for Visit:  Follow up     Diagnosis Information:  [email or web address] [email or web address]-[email or web address]-[email or web address]-[email or web address]-[email or web address]-[email or web address]      Plan: Your scan looks stable. Follow Up:  6 months with repeat scan and labs    Recent Lab Results:  N/a    Treatment Summary has been discussed and given to patient:         -------------------------------------------------------------------------------------------------------------------    Patient does express an interest in My Chart. My Chart log in information explained on the after visit summary printout at the [facility] desk.     ZAHEER Brock

## 2022-11-21 ENCOUNTER — TELEPHONE (OUTPATIENT)
Dept: SURGERY | Age: 52
End: 2022-11-21

## 2022-11-21 RX ORDER — SEMAGLUTIDE 1.34 MG/ML
INJECTION, SOLUTION SUBCUTANEOUS
Qty: 3 ML | Refills: 0 | OUTPATIENT
Start: 2022-11-21

## 2022-11-21 NOTE — TELEPHONE ENCOUNTER
Returned call to patient via language services. Patient was calling concerned that he and his wife were running out of their OM medications. Patient had an apt on 11/15/2022, but had to cancel it \"due to work\". Patient now states that he cannot get back in for an apt until 12/7/2022, and is wanting to know what he should do without his medication. I explained to patient that it was very important when he was scheduling his apts to make it for an apt time that he will be able to come to. Explained that [facility] normally is unable to refill medication in between visits. Please advise. Thank you.

## 2022-11-23 ENCOUNTER — TELEPHONE (OUTPATIENT)
Dept: SURGERY | Age: 52
End: 2022-11-23

## 2022-11-23 NOTE — TELEPHONE ENCOUNTER
LVM for patient returning his call. Patient would need to be seen for refill of medication, and at this time we do not have any available apts before his next scheduled apt. Advised patient that he could phone the office to see if there have been any cancellations.

## 2022-12-01 NOTE — PROGRESS NOTES
Zahida Morales MD   Bariatric & Advanced Laparoscopic Surgery & Endoscopy  [address] [place] [address]  Katie Reeder                          Office [phone] Fax [phone]        Date of visit: 12/7/2022      History and Physical    Patient: Don Palomino MRN: [id number]     YOB: 1970  Age: 47 y.o. Sex: male              PCP: Nelma Habermann, MD   Pharmacy:   [facility], [address] [place] [address]  [address] [address]  Phone: [phone] Fax: [phone]     Date:  12/7/2022    4 months post medication start  He has there was no change in patient's weight. since his last office visit. No flowsheet data found. He has been doing well. He also denies any nausea, vomiting and abdominal pain. He also reports decreased appetite. Clinical Assessment and Physical Exam:    he is doing very well today and is feeling good. He reports that he has been adhering to the protein first diet without difficulty. He denies any tachycardia, abdominal pain, nausea or vomiting. He does not report having problems with constipation. Protein:  60 grams per day  Fluids:    40-50 ounces per day  Physical Activity:  none due to knee pain       No flowsheet data found. Initial Consult Date 7/13/2022   Initial Weight 218   Ideal Body Weight 155   Initial BMI 35.19   Initial Neck Circumference 15.25   Initial Waist Circumference 48.50   Initial BF% 34       Medication Semaglutide   Medication Start Date 8/19/2022       Today's Weight 212 lb (96.2 kg)   Today's BMI Body mass index is 34.22 kg/m².     Today's Neck Circumference 14.5   Today's Waist 46   Today's BF% Not measured     % WL 4.6    Evaluation of Co-morbid Conditions  Sleep Apnea No, uses CPAP- NA   Diabetes No, insulin dependent- NA   Hypertension No, number of medications- 0   GERD No, treatment medication- NA Hyperlipidemia No       Lowest weight 203 lbs  Highest weight 316 lbs  Biggest challenge to weight loss - mindful eating    MEDICAL HISTORY:  Morbid Obesity       Comorbidity Yes or No   Hypertension- how many medications = 0 No   Hyperlipidemia No   Diabetes Mellitus  Insulin dependent = no  Last A1c = ? No   Coronary Artery Disease No   Gastroesophageal Reflux  Treatment Med = No No   Obstructive Sleep Apnea No   Cancer No   Asthma No   Osteoarthritis No   Joint Pain No      He denies dysphagia. Other Yes or No   Migraines No   Seizures or Epilepsy No   Glaucoma No   Hyperthyroidism No   Irregular Heartbeat or palpitations No   Gastroparesis No   History of gallstones or pancreatitis Yes - s/p cholecystectomy   Malabsorption No   Kidney Stones No - one KIDNEY       Venous Stasis No   Dialysis No   Long term use of steroid or immunocompromised conditions No   Chronic opioids No   On Anticoagulants No     He denies personal or family history of problems with anesthesia, bleeding, or clotting. He denies history of DVT or pulmonary embolism. DENTAL/CHEWING ABILITY:  No dental issues with chewing. PRIOR WEIGHT LOSS ATTEMPTS:  has participated in supervised weight loss. This consists of the following programs:  sleeve gastrectomy on 2020      EXERCISE ASSESSMENT:  He is going to play volleyball on Fridays. Reviewed NIH guidelines. PSYCHOSOCIAL:  He notes he is  and states main support person is [employer]. He is employed as a  at [employer]. His goal in pursuing medical weight loss is to improve health. He denies any active psychological problems and reports appropriate treatment of anxiety. He states he is independent in his care, can drive a car, and can ambulate without assistance.     HISTORY:  Past Medical History:   Diagnosis Date    Chronic pain     Depression     GERD (gastroesophageal reflux disease)     Hypertension     Sleep apnea     C-pap broken, does not use        Last colonoscopy: never    Past Surgical History:   Procedure Laterality Date    CHOLECYSTECTOMY  2022    CHOLECYSTECTOMY, LAPAROSCOPIC  2022    COLONOSCOPY      COLONOSCOPY N/A 2022    COLORECTAL CANCER SCREENING, NOT HIGH RISK/ BMI 35/ Indonesian performed by Frank Patel MD at [address]  2020    gastric sleeve    GI      KIDNEY SURGERY Right 02/15/2022    removed tumor     OTHER SURGICAL HISTORY      Pt states \"bumps removed from lower back\"     UPPER GASTROINTESTINAL ENDOSCOPY       Social History     Tobacco Use    Smoking status: Former     Types: Cigarettes     Quit date: 2016     Years since quittin.5    Smokeless tobacco: Never   Substance Use Topics    Alcohol use: Yes    Drug use: Never     Family History   Problem Relation Age of Onset    Osteoarthritis Mother     No Known Problems Father        Any family history of Multiple Endocrine Neoplasia Syndrome or Medullary Thyroid Carcinoma? No     MEDICATIONS:  Current Outpatient Medications   Medication Sig Dispense Refill    Semaglutide, 1 MG/DOSE, 4 MG/3ML SOPN Inject 1 mg into the skin once a week 9 mL 0    Multiple Vitamin (MULTI-VITAMIN DAILY PO) Take by mouth      senna-docusate (PERICOLACE) 8.6-50 MG per tablet Take 1 tablet by mouth 2 times daily (Patient not taking: No sig reported)       No current facility-administered medications for this visit. ALLERGIES:  No Known Allergies    ROS: The patient has no difficulty with chest pain or shortness of breath. No fever or chills. Comprehensive 13 point review of systems was otherwise unremarkable except as noted above. Physical Exam:     BP (!) 162/83   Pulse 67   Ht 5' 6\" (1.676 m)   Wt 212 lb (96.2 kg)   BMI 34.22 kg/m²        No flowsheet data found. General:  Well-developed, well-nourished, no distress. Psych:  Cooperative, good insight and judgement.   Neuro:  Alert, oriented to person, place and time. HEENT:  Normocephalic, atraumatic. Sclera clear. Lungs:  Unlabored breathing. Symmetrical chest expansion. Chest wall:  No tenderness or deformity. Heart:  Regular rate and rhythm. No JVD. Extremities:  Extremities normal, atraumatic, no cyanosis or edema. Skin:  Skin color, texture, turgor normal. No rashes. ASSESSMENT:  Morbid obesity with a Body mass index is Body mass index is 34.22 kg/m². . following up in our multi-disciplinary weight loss program. He is very happy with the result. He is very motivated with the lifestyle changes he has incorporated. PLAN:    He is interested in continuing with the medication treatment for weight loss to assist with his co morbidities. Diagnosis Orders   1. Class 2 severe obesity due to excess calories with serious comorbidity and body mass index (BMI) of 35.0 to 35.9 in [facility])        2. S/P laparoscopic sleeve gastrectomy        3. Exercise counseling        4. Dietary counseling and surveillance            1. We discussed any symptoms he has experienced since starting the medications and none seem significant to stop the medication. he disires to continue the medication and and continue to monitor closely. 2. We discussed again risks and benefits of their medication and he agreed to continue. Semaglutide helps decrease hunger, increase energy and metabolic rate. Side effects include nausea, vomiting, diarrhea, abdominal pain and pancreatitis. The patient verbalized and demonstrated a clear understanding of what to expect moving forward. 3.  Patient continues to be an excellent candidate with a clear medical necessity for weight loss. 4.  Encouraged patient to participate in our Bariatric Support Group. 5.  Advised that weight loss medication is only one part of a lifelong weight management program, and that sustainable weight loss will only come with major changes in eating habits, physical activity and behavior. Advised that obesity is a chronic disease and requires a commitment to self-management and long-term follow up. 6.  Nutrition Recommendations: please see dietitian notes. 7.  Exercise Recommendations: Exercise routine, incorporating both cardio and strength training, building up to 5x/wk for at least 300 minutes a week and include 2 strength session a week. Refills sent today: semaglutide 1mg    FU in 3 month(s) to discuss any side effects of medications and script refills. Time: I spent 40 minutes preparing to see patient (including chart review and preparation), obtaining and/or reviewing additional medical history, performing a physical exam and evaluation, documenting clinical information in the electronic health record, independently interpreting results, communicating results to patient, family or caregiver, and/or coordinating care.       Italo Bryan MD  Bariatric & Minimally Invasive Surgery  [facility]  12/7/2022 10:48 AM

## 2022-12-06 NOTE — PROGRESS NOTES
Anthony Rivas, MS, RD, LD  Surgical Weight Loss Dietitian  [address] [place] [address], [address] [place] [address]  Katie Reeder  Phone [phone]   Fax [phone]    OBESITY MEDICINE NUTRITION REASSESSMENT     Assessment:  BW: 212# (-0#, visit 5 of obesity medicine weight loss program)  Pt reports fair dietary compliance since last office visit. Pt getting ~60g protein/d and 50oz fluid/d. Pt is eating at least 3x/d. Pt reports fair compliance with mindful eating behaviors. Pt is waiting 30 minutes after eating to drink liquids. He is drinking water, coffee. Pt is not exercising secondary to knee pain. Pt reports struggling with willpower to eat foods \"off of the diet\", experiencing increased hunger secondary to being off medication. Diet Recall:              Breakfast: eggs              Snack: fruit              Lunch: chicken and potatoes              Dinner: coffee              Drinks: water, coffee      Medication: Semaglutide  Medication Start date: 8/19/2022     Measurement Flowsheet        Initial Consult Date 7/13/2022   Initial Height 5'6\"   Initial Weight 218lb   IBW 155lb   EBW 63lb   Initial Neck 15.25\"   Initial Waist 48.50\"   Initial %BF 34.0%      F/U Date 12/7/2022   Weight 212 lb   Weight Change -6 lb   Neck 14.5 \"   Neck Change -0.75 \"   Waist 46.0 \"   Waist Change -2.5 \"   %BF Not assessed today   %BF Change N/a         Nutrition Diagnosis:   Class I obesity R/T hx of excessive energy intake as evidenced by BMI = 34.22 and 137% IBW. --ongoing, modified--     Ongoing Goals: Pt will structure meals and change food choices to facilitate 5-10% weight loss within duration of OM treatment. Action Steps:           1. Pt will track PO intake in food log          2. Pt will practice with mindful eating techniques          3. Pt will select nutrient vs energy dense foods      Intervention:   Evaluated diet recall/ food journal and identified modifications.   Encouraged lean protein focus  Encouraged incorporation of non-starchy vegetables  Encouraged use of protein supplements as snacks and not meals  Encouraged continued use of food journal for tracking diet intake  Educated pt on slowing down at mealtimes, practicing mindful eating behaviors. Encouraged continued and increased activity. Pt goal: By next visit, pt will exercise 3x/week for 30-120mins via volleyball, dance, walking. Monitoring and Evaluation:  Monitor for continued safe, supervised weight loss for VSG/OM.     F/U per MD Mark Arce, MS, RD, LD

## 2022-12-07 ENCOUNTER — OFFICE VISIT (OUTPATIENT)
Dept: SURGERY | Age: 52
End: 2022-12-07
Payer: COMMERCIAL

## 2022-12-07 VITALS
SYSTOLIC BLOOD PRESSURE: 162 MMHG | DIASTOLIC BLOOD PRESSURE: 83 MMHG | HEART RATE: 67 BPM | WEIGHT: 212 LBS | HEIGHT: 66 IN | BODY MASS INDEX: 34.07 KG/M2

## 2022-12-07 DIAGNOSIS — Z71.82 EXERCISE COUNSELING: ICD-10-CM

## 2022-12-07 DIAGNOSIS — Z71.3 DIETARY COUNSELING AND SURVEILLANCE: ICD-10-CM

## 2022-12-07 DIAGNOSIS — Z98.84 S/P LAPAROSCOPIC SLEEVE GASTRECTOMY: ICD-10-CM

## 2022-12-07 DIAGNOSIS — E66.01 CLASS 2 SEVERE OBESITY DUE TO EXCESS CALORIES WITH SERIOUS COMORBIDITY AND BODY MASS INDEX (BMI) OF 35.0 TO 35.9 IN ADULT (HCC): Primary | ICD-10-CM

## 2022-12-07 PROCEDURE — 99215 OFFICE O/P EST HI 40 MIN: CPT | Performed by: SURGERY

## 2022-12-12 ENCOUNTER — TELEPHONE (OUTPATIENT)
Dept: SURGERY | Age: 52
End: 2022-12-12

## 2022-12-12 NOTE — TELEPHONE ENCOUNTER
Patient called to inform Dr. Quinn Jose, his pharmacy let him know his semaglutide is on back order. It may not be in until 2023. I let him know, she is aware it is on back order, however, I will alert her again. Difficulty remembering

## 2023-01-09 ENCOUNTER — TELEPHONE (OUTPATIENT)
Dept: SURGERY | Age: 53
End: 2023-01-09

## 2023-01-09 NOTE — TELEPHONE ENCOUNTER
Patient called stating that his and his wife's medication is too expensive and is wanting you to prescribe them something else.

## 2023-01-17 ENCOUNTER — TELEPHONE (OUTPATIENT)
Dept: SURGERY | Age: 53
End: 2023-01-17

## 2023-01-17 NOTE — TELEPHONE ENCOUNTER
[id number], spoke with patient about the medication in which he states is to expensive for he and his spouse. Per Dr. Timothy Louise if they would like a new medication, they need to make an appt to come in to discuss the options. Patient verbalized his understanding, he was at work and didn't have long to talk.

## 2023-03-14 ENCOUNTER — OFFICE VISIT (OUTPATIENT)
Dept: SURGERY | Age: 53
End: 2023-03-14
Payer: COMMERCIAL

## 2023-03-14 VITALS
SYSTOLIC BLOOD PRESSURE: 152 MMHG | HEART RATE: 67 BPM | HEIGHT: 66 IN | WEIGHT: 214 LBS | DIASTOLIC BLOOD PRESSURE: 95 MMHG | BODY MASS INDEX: 34.39 KG/M2

## 2023-03-14 DIAGNOSIS — E66.01 CLASS 2 SEVERE OBESITY DUE TO EXCESS CALORIES WITH SERIOUS COMORBIDITY AND BODY MASS INDEX (BMI) OF 35.0 TO 35.9 IN ADULT (HCC): ICD-10-CM

## 2023-03-14 DIAGNOSIS — Z71.3 DIETARY COUNSELING AND SURVEILLANCE: ICD-10-CM

## 2023-03-14 DIAGNOSIS — G47.33 OSA (OBSTRUCTIVE SLEEP APNEA): ICD-10-CM

## 2023-03-14 DIAGNOSIS — E66.9 OBESITY, CLASS II, BMI 35-39.9: ICD-10-CM

## 2023-03-14 DIAGNOSIS — Z71.82 EXERCISE COUNSELING: ICD-10-CM

## 2023-03-14 DIAGNOSIS — Z98.84 S/P LAPAROSCOPIC SLEEVE GASTRECTOMY: Primary | ICD-10-CM

## 2023-03-14 PROCEDURE — 99215 OFFICE O/P EST HI 40 MIN: CPT | Performed by: SURGERY

## 2023-03-14 RX ORDER — TOPIRAMATE 25 MG/1
TABLET ORAL
Qty: 45 TABLET | Refills: 0 | Status: SHIPPED | OUTPATIENT
Start: 2023-03-14 | End: 2023-04-13

## 2023-03-14 RX ORDER — PHENTERMINE HYDROCHLORIDE 37.5 MG/1
18.75 TABLET ORAL
Qty: 15 TABLET | Refills: 0 | Status: SHIPPED | OUTPATIENT
Start: 2023-03-14 | End: 2023-04-13

## 2023-03-14 NOTE — PROGRESS NOTES
Bonnie Hermosillo, MS, RD, LD  Surgical Weight Loss Dietitian  [address] [place] [address], [facility] [address], Katie Price  Phone [phone]   Fax [phone]    OBESITY MEDICINE NUTRITION REASSESSMENT     Assessment:  BW: 214# (+2#, visit 6 of obesity medicine weight loss program)  Pt reports fair dietary compliance since last office visit. Pt getting ~90g protein/d and 90oz fluid/d. Pt is not eating at least 3x/d. Pt reports good compliance with mindful eating behaviors. Pt is waiting 30 minutes after eating to drink liquids. He is drinking water. Pt is exercising via walking and volleyball 3-4d/wk for 60 minutes. Diet Recall:              Breakfast: coffee with toast              Lunch: pasta with meat              Dinner: cereal with milk              Drinks: water      Medication: Semaglutide  Medication Start date: 8/19/2022     Measurement Flowsheet        Initial Consult Date 7/13/2022   Initial Height 5'6\"   Initial Weight 218lb   IBW 155lb   EBW 63lb   Initial Neck 15.25\"   Initial Waist 48.50\"   Initial %BF 34.0%      F/U Date 3/14/2023   Weight 214 lb   Weight Change -4 lb   Neck 14.25 \"   Neck Change -1.0 \"   Waist 44.5 \"   Waist Change -4.0 \"   %BF 32.9%   %BF Change -1.1%         Nutrition Diagnosis:   Class I obesity R/T hx of excessive energy intake as evidenced by BMI = 34.54 and 138% IBW. --ongoing, modified--BMI and %IBW adjusted to reflect weight gain--     Ongoing Goals: Pt will structure meals and change food choices to facilitate 5-10% weight loss within duration of OM treatment. Action Steps:           1. Pt will track PO intake in food log          2. Pt will practice with mindful eating techniques          3. Pt will select nutrient vs energy dense foods      Intervention:   Evaluated diet recall/ food journal and identified modifications.   Encouraged lean protein focus  Encouraged incorporation of non-starchy vegetables  Encouraged use of protein supplements as snacks and not meals  Encouraged continued use of food journal for tracking diet intake  Educated pt on slowing down at mealtimes, practicing mindful eating behaviors. Encouraged continued and increased activity. Pt goal: By next visit, pt will exercise 4-5x/week for 60mins via walking and volleyball. Monitoring and Evaluation:  Monitor for continued safe, supervised weight loss for VSG/OM.     F/U per MD Rashid Metcalf, MS, RD, LD

## 2023-03-14 NOTE — PROGRESS NOTES
Ramiro Chowdhury MD   Bariatric & Advanced Laparoscopic Surgery & Endoscopy  [address] [facility] [address], [address] [facility]  Katie Reeder                          Office [phone] Fax [phone]        Date of visit: 3/14/2023      History and Physical    Patient: Elenita Marquez MRN: [id number]     YOB: 1970  Age: 47 y.o. Sex: male              PCP: Kim Parisi MD   Pharmacy:   [facility], [address] [facility] 40  [address] [address]  Phone: [phone] Fax: [phone]     Date:  3/14/2023    7 months post medication start  He has gained, 2 lbs. since his last office visit. No flowsheet data found. He has been doing well. He has been off medication for 3 months because it was not approved by insurance. Clinical Assessment and Physical Exam:    he is doing very well today and is feeling good. He reports that he has been adhering to the protein first diet without difficulty. He denies any tachycardia, abdominal pain, nausea or vomiting. He does not report having problems with constipation. Protein:  60 grams per day  Fluids:    40-50 ounces per day  Physical Activity:  none due to knee pain       No flowsheet data found. Initial Consult Date 7/13/2022   Initial Weight 218   Ideal Body Weight 155   Initial BMI 35.19   Initial Neck Circumference 15.25   Initial Waist Circumference 48.50   Initial BF% 34       Medication Semaglutide -> Phen/top   Medication Start Date 8/19/2022       Today's Weight 214 lb (97.1 kg)   Today's BMI Body mass index is 34.54 kg/m².     Today's Neck Circumference 14.25   Today's Waist 44.5   Today's BF% Not measured     % WL 1.83    Evaluation of Co-morbid Conditions  Sleep Apnea No, uses CPAP- NA   Diabetes No, insulin dependent- NA   Hypertension No, number of medications- 0   GERD No, treatment medication- NA   Hyperlipidemia No Lowest weight 203 lbs  Highest weight 316 lbs  Biggest challenge to weight loss - mindful eating    MEDICAL HISTORY:  Morbid Obesity       Comorbidity Yes or No   Hypertension- how many medications = 0 No   Hyperlipidemia No   Diabetes Mellitus  Insulin dependent = no  Last A1c = ? No   Coronary Artery Disease No   Gastroesophageal Reflux  Treatment Med = No No   Obstructive Sleep Apnea No   Cancer No   Asthma No   Osteoarthritis No   Joint Pain No      He denies dysphagia. Other Yes or No   Migraines No   Seizures or Epilepsy No   Glaucoma No   Hyperthyroidism No   Irregular Heartbeat or palpitations No   Gastroparesis No   History of gallstones or pancreatitis Yes - s/p cholecystectomy   Malabsorption No   Kidney Stones No - one KIDNEY       Venous Stasis No   Dialysis No   Long term use of steroid or immunocompromised conditions No   Chronic opioids No   On Anticoagulants No     He denies personal or family history of problems with anesthesia, bleeding, or clotting. He denies history of DVT or pulmonary embolism. DENTAL/CHEWING ABILITY:  No dental issues with chewing. PRIOR WEIGHT LOSS ATTEMPTS:  has participated in supervised weight loss. This consists of the following programs:  sleeve gastrectomy on 2020      EXERCISE ASSESSMENT:  He is going to play volleyball on Fridays. Reviewed NIH guidelines. PSYCHOSOCIAL:  He notes he is  and states main support person is [employer]. He is employed as a  at [employer]. His goal in pursuing medical weight loss is to improve health. He denies any active psychological problems and reports appropriate treatment of anxiety. He states he is independent in his care, can drive a car, and can ambulate without assistance.     HISTORY:  Past Medical History:   Diagnosis Date    Chronic pain     Depression     GERD (gastroesophageal reflux disease)     Hypertension     Sleep apnea     C-pap broken, does not use        Last colonoscopy: never    Past Surgical History:   Procedure Laterality Date    CHOLECYSTECTOMY  2022    CHOLECYSTECTOMY, LAPAROSCOPIC  2022    COLONOSCOPY      COLONOSCOPY N/A 2022    COLORECTAL CANCER SCREENING, NOT HIGH RISK/ BMI 35/ [place]ian performed by Brian Wilkinson MD at [address]  2020    gastric sleeve    GI      KIDNEY SURGERY Right 02/15/2022    removed tumor     OTHER SURGICAL HISTORY      Pt states \"bumps removed from lower back\"     UPPER GASTROINTESTINAL ENDOSCOPY       Social History     Tobacco Use    Smoking status: Former     Types: Cigarettes     Quit date: 2016     Years since quittin.7    Smokeless tobacco: Never   Substance Use Topics    Alcohol use: Yes    Drug use: Never     Family History   Problem Relation Age of Onset    Osteoarthritis Mother     No Known Problems Father        Any family history of Multiple Endocrine Neoplasia Syndrome or Medullary Thyroid Carcinoma? No     MEDICATIONS:  Current Outpatient Medications   Medication Sig Dispense Refill    Multiple Vitamin (MULTI-VITAMIN DAILY PO) Take by mouth      senna-docusate (PERICOLACE) 8.6-50 MG per tablet Take 1 tablet by mouth 2 times daily (Patient not taking: No sig reported)       No current facility-administered medications for this visit. ALLERGIES:  No Known Allergies    ROS: The patient has no difficulty with chest pain or shortness of breath. No fever or chills. Comprehensive 13 point review of systems was otherwise unremarkable except as noted above. Physical Exam:     BP (!) 152/95   Pulse 67   Ht 5' 6\" (1.676 m)   Wt 214 lb (97.1 kg)   BMI 34.54 kg/m²        No flowsheet data found. General:  Well-developed, well-nourished, no distress. Psych:  Cooperative, good insight and judgement. Neuro:  Alert, oriented to person, place and time. HEENT:  Normocephalic, atraumatic. Sclera clear. Lungs:  Unlabored breathing.  Symmetrical chest expansion. Chest wall:  No tenderness or deformity. Heart:  Regular rate and rhythm. No JVD. Extremities:  Extremities normal, atraumatic, no cyanosis or edema. Skin:  Skin color, texture, turgor normal. No rashes. ASSESSMENT:  Morbid obesity with a Body mass index is Body mass index is 34.54 kg/m². . following up in our multi-disciplinary weight loss program. He is very happy with the result. He is very motivated with the lifestyle changes he has incorporated. PLAN:    He is interested in continuing with the medication treatment for weight loss to assist with his co morbidities. Diagnosis Orders   1. S/P laparoscopic sleeve gastrectomy        2. Class 2 severe obesity due to excess calories with serious comorbidity and body mass index (BMI) of 35.0 to 35.9 in adult (RUSTca 75.)        3. Exercise counseling        4. YOUSIF (obstructive sleep apnea)        5. Obesity, Class II, BMI 35-39.9        6. Dietary counseling and surveillance          1. We discussed any symptoms he has experienced since starting the medications and none seem significant to stop the medication. he disires to continue the medication and and continue to monitor closely. 2. We discussed again risks and benefits of their medication and he agreed to continue. Phentermine is a stimulant that helps decrease hunger, increase energy and metabolic rate. Side effects include palpitations, increased bloop pressure, overstimulation, tremor. Topiramate is a longer acting neuro stabilizer approved for seizures and migraine headache. It is unclear how it works with weight loss but it decreases hunger and increases satiety. Side effects of medication include acute angle glaucoma, paresthesia, cognitive impairment, somnolence, kidney stones, taste aversion and interaction with OCPs. We discuss off label use of topiramate for weight loss.        The patient verbalized and demonstrated a clear understanding of what to expect moving forward. 3.  Patient continues to be an excellent candidate with a clear medical necessity for weight loss. 4.  Encouraged patient to participate in our Bariatric Support Group. 5.  Advised that weight loss medication is only one part of a lifelong weight management program, and that sustainable weight loss will only come with major changes in eating habits, physical activity and behavior. Advised that obesity is a chronic disease and requires a commitment to self-management and long-term follow up. 6.  Nutrition Recommendations: please see dietitian notes. 7.  Exercise Recommendations: Exercise routine, incorporating both cardio and strength training, building up to 5x/wk for at least 300 minutes a week and include 2 strength session a week. Refills sent today: phentermine 18.75mg/ topiramate 25-50 mg     FU in 1 months to discuss any side effects of medications and script refills. Time: I spent 40 minutes preparing to see patient (including chart review and preparation), obtaining and/or reviewing additional medical history, performing a physical exam and evaluation, documenting clinical information in the electronic health record, independently interpreting results, communicating results to patient, family or caregiver, and/or coordinating care.       Brayan Quarles MD  Bariatric & Minimally Invasive Surgery  [facility]  3/14/2023 3:19 PM

## 2023-03-15 RX ORDER — TOPIRAMATE 25 MG/1
TABLET ORAL
Qty: 135 TABLET | OUTPATIENT
Start: 2023-03-15

## 2023-05-09 ENCOUNTER — OFFICE VISIT (OUTPATIENT)
Dept: SURGERY | Age: 53
End: 2023-05-09
Payer: COMMERCIAL

## 2023-05-09 VITALS
SYSTOLIC BLOOD PRESSURE: 139 MMHG | HEIGHT: 66 IN | DIASTOLIC BLOOD PRESSURE: 88 MMHG | WEIGHT: 218 LBS | HEART RATE: 71 BPM | BODY MASS INDEX: 35.03 KG/M2

## 2023-05-09 DIAGNOSIS — Z98.84 S/P LAPAROSCOPIC SLEEVE GASTRECTOMY: Primary | ICD-10-CM

## 2023-05-09 DIAGNOSIS — E66.01 CLASS 2 SEVERE OBESITY DUE TO EXCESS CALORIES WITH SERIOUS COMORBIDITY AND BODY MASS INDEX (BMI) OF 35.0 TO 35.9 IN ADULT (HCC): ICD-10-CM

## 2023-05-09 DIAGNOSIS — Z71.82 EXERCISE COUNSELING: ICD-10-CM

## 2023-05-09 DIAGNOSIS — Z71.3 DIETARY COUNSELING AND SURVEILLANCE: ICD-10-CM

## 2023-05-09 DIAGNOSIS — G47.33 OBSTRUCTIVE SLEEP APNEA (ADULT) (PEDIATRIC): ICD-10-CM

## 2023-05-09 DIAGNOSIS — G47.33 OSA (OBSTRUCTIVE SLEEP APNEA): ICD-10-CM

## 2023-05-09 PROCEDURE — 99215 OFFICE O/P EST HI 40 MIN: CPT | Performed by: SURGERY

## 2023-05-09 RX ORDER — SEMAGLUTIDE 0.5 MG/.5ML
0.5 INJECTION, SOLUTION SUBCUTANEOUS
Qty: 2 ML | Refills: 0 | Status: SHIPPED | OUTPATIENT
Start: 2023-05-09 | End: 2023-06-08

## 2023-05-09 NOTE — PROGRESS NOTES
Nina Cervantes MD   Bariatric & Advanced Laparoscopic Surgery & Endoscopy  [address] [facility] [address], [address] [facility]  K[place] Reeder                          Office [phone] Fax [phone]        Date of visit: 5/9/2023      History and Physical    Patient: Amparo Alcantara MRN: [id number]     YOB: 1970  Age: 47 y.o. Sex: male              PCP: Lonny De Oliveira MD   Pharmacy:   [facility], [address] [facility] 40  [address] [address]  Phone: [phone] Fax: [phone]       Date:  5/9/2023    9 months post medication start  He has gained, 4 lbs. since his last office visit. No flowsheet data found. He has been doing well. He has been off medication for 2 weeks because he ran out. He reports appetite was not as controlled. He does not feel the restriction as he felt with the injection. Clinical Assessment and Physical Exam:    he is doing very well today and is feeling good. He reports that he has been adhering to the protein first diet without difficulty. He denies any tachycardia, abdominal pain, nausea or vomiting. He does not report having problems with constipation. Protein:  60 grams per day  Fluids:    40-50 ounces per day  Physical Activity:  none due to knee pain       No flowsheet data found. Initial Consult Date 7/13/2022   Initial Weight 218   Ideal Body Weight 155   Initial BMI 35.19   Initial Neck Circumference 15.25   Initial Waist Circumference 48.50   Initial BF% 34       Medication Semaglutide -> Phen/top   Medication Start Date 8/19/2022       Today's Weight 218 lb (98.9 kg)   Today's BMI Body mass index is 35.19 kg/m².     Today's Neck Circumference 15.25   Today's Waist 48   Today's BF% pending     % WL 1.83    Evaluation of Co-morbid Conditions  Sleep Apnea No, uses CPAP- NA   Diabetes No, insulin dependent- NA   Hypertension No, number of

## 2023-05-15 ENCOUNTER — OFFICE VISIT (OUTPATIENT)
Dept: ONCOLOGY | Age: 53
End: 2023-05-15
Payer: COMMERCIAL

## 2023-05-15 ENCOUNTER — HOSPITAL ENCOUNTER (OUTPATIENT)
Dept: LAB | Age: 53
Discharge: HOME | End: 2023-05-18
Payer: COMMERCIAL

## 2023-05-15 VITALS
WEIGHT: 219.8 LBS | RESPIRATION RATE: 16 BRPM | BODY MASS INDEX: 35.32 KG/M2 | HEIGHT: 66 IN | HEART RATE: 86 BPM | SYSTOLIC BLOOD PRESSURE: 130 MMHG | TEMPERATURE: 98.4 F | DIASTOLIC BLOOD PRESSURE: 83 MMHG | OXYGEN SATURATION: 97 %

## 2023-05-15 DIAGNOSIS — C64.9 RENAL CELL CARCINOMA, UNSPECIFIED LATERALITY (HCC): ICD-10-CM

## 2023-05-15 DIAGNOSIS — C64.9 RENAL CELL CARCINOMA, UNSPECIFIED LATERALITY (HCC): Primary | ICD-10-CM

## 2023-05-15 DIAGNOSIS — N28.89 RENAL MASS, RIGHT: Primary | ICD-10-CM

## 2023-05-15 LAB
ALBUMIN SERPL-MCNC: 3.7 G/DL (ref 3.5–5)
ALBUMIN/GLOB SERPL: 0.9 (ref 0.4–1.6)
ALP SERPL-CCNC: 76 U/L (ref 50–136)
ALT SERPL-CCNC: 48 U/L (ref 12–65)
ANION GAP SERPL CALC-SCNC: 8 MMOL/L (ref 2–11)
AST SERPL-CCNC: 27 U/L (ref 15–37)
BILIRUB SERPL-MCNC: 0.3 MG/DL (ref 0.2–1.1)
BUN SERPL-MCNC: 19 MG/DL (ref 6–23)
CALCIUM SERPL-MCNC: 8.6 MG/DL (ref 8.3–10.4)
CHLORIDE SERPL-SCNC: 109 MMOL/L (ref 101–110)
CO2 SERPL-SCNC: 25 MMOL/L (ref 21–32)
CREAT SERPL-MCNC: 0.9 MG/DL (ref 0.8–1.5)
GLOBULIN SER CALC-MCNC: 3.9 G/DL (ref 2.8–4.5)
GLUCOSE SERPL-MCNC: 78 MG/DL (ref 65–100)
POTASSIUM SERPL-SCNC: 3.8 MMOL/L (ref 3.5–5.1)
PROT SERPL-MCNC: 7.6 G/DL (ref 6.3–8.2)
SODIUM SERPL-SCNC: 142 MMOL/L (ref 133–143)

## 2023-05-15 PROCEDURE — 99213 OFFICE O/P EST LOW 20 MIN: CPT | Performed by: UROLOGY

## 2023-05-15 PROCEDURE — 80053 COMPREHEN METABOLIC PANEL: CPT

## 2023-05-15 PROCEDURE — 36415 COLL VENOUS BLD VENIPUNCTURE: CPT

## 2023-05-15 ASSESSMENT — PATIENT HEALTH QUESTIONNAIRE - PHQ9
SUM OF ALL RESPONSES TO PHQ9 QUESTIONS 1 & 2: 0
SUM OF ALL RESPONSES TO PHQ QUESTIONS 1-9: 0
2. FEELING DOWN, DEPRESSED OR HOPELESS: 0
1. LITTLE INTEREST OR PLEASURE IN DOING THINGS: 0
SUM OF ALL RESPONSES TO PHQ QUESTIONS 1-9: 0

## 2023-05-15 ASSESSMENT — ENCOUNTER SYMPTOMS
RESPIRATORY NEGATIVE: 1
ABDOMINAL PAIN: 1

## 2023-05-15 NOTE — CONSULTS
Session ID: [id number]  Request [id number]  Language:Argentine  Status:Fulfilled  Agent HH:#[id number]  Agent Guicho Marie

## 2023-05-15 NOTE — PATIENT INSTRUCTIONS
Patient Instructions from Today's Visit    Reason for Visit:  Follow up     Diagnosis Information:  [email or web address]      Plan:  Please have the scan in the next week or so  We will see you back in 9 months with repeat scan and labs. Follow Up: In 9 months with scan     Recent Lab Results:  N/a    Treatment Summary has been discussed and given to patient:   N/a      -------------------------------------------------------------------------------------------------------------------    Patient does express an interest in My Chart. My Chart log in information explained on the after visit summary printout at the [facility] desk.     ZAHEER Carlson

## 2023-05-15 NOTE — PROGRESS NOTES
BMI 35.48 kg/m²   General: well dressed, well nourished, no acute distress  Skin: no rashes  HEENT: Sclera are clear,normocephalic, atraumatic. no external lesions   Cardiovascular: Reg. Normal perfusion  Respiratory: normal respiratory effort, no JVD, no audible wheezing. Musculoskeletal: unremarkable with normal function. No embolic signs or cyanosis. Neurologic exam: intact, no focal deficits, moves all 4 extremities  Psych: normal mood and affect, alert, oriented x 3  LE:  no edema  GI: soft, nontender, no masses, no CVA tenderness--abdomen is soft and nontender  : DEFERRED       LABORATORY RESULTS:  CMP pending      IMAGING:      CT Pending    ASSESSMENT:    Mr. Brian Miller is a 47 y.o. 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 He is status post right open partial nephrectomy on 2/15/2022. 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 Patient also has a known 5 cm left adrenal myolipoma under observation. It is unclear what is causing his bilateral lower abdominal pain. I have recommended we go ahead and get a CT of his chest and pelvis with IV contrast as we have scheduled. I like that done within the next 1 to 2 weeks. I plan to check a CMP today. If there is any abnormality I will plan to see him back the following week. If his imaging study is normal of asked him return to see me with a CMP and CT chest abdomen pelvis with IV contrast in 9 months. He continues to have lower abdominal pain advised him to follow-up with his primary care provider. PLAN:   -pt to have missed ct scan  -will call with results  -cmp today  -rtc in 9 months with repeat scan, labs and ov   ________________________________________      I have seen and examined this patient. I have reviewed and edited the note started by the MA and agree with the outlined plan. Part of this note was written by using a voice dictation software.  The

## 2023-05-30 RX ORDER — SEMAGLUTIDE 0.5 MG/.5ML
0.5 INJECTION, SOLUTION SUBCUTANEOUS
Qty: 2 ML | Refills: 0 | OUTPATIENT
Start: 2023-05-30 | End: 2023-06-29

## 2023-07-20 NOTE — TELEPHONE ENCOUNTER
Patient called and appt made.
Patient calling to reschedule with Dr Murray Mandujano.  He asks for a return call after 3:30 so he can  his phone
18-Jul-2023

## 2023-08-07 ENCOUNTER — TELEPHONE (OUTPATIENT)
Dept: SURGERY | Age: 53
End: 2023-08-07

## 2023-08-07 NOTE — TELEPHONE ENCOUNTER
Returned call to patient regarding his request for his OM medications to be refilled. Left message through language services that these medications cannot be filled without having an office visit. Patient was last seen on 6/15/2023, and instructed to follow up in one month. Patient called today on 8/7/2023 wanting to make an apt.

## 2023-09-11 NOTE — PROGRESS NOTES
[facility]ESITY MEDICINE NUTRITION REASSESSMENT     Assessment:    Pt reports fair dietary compliance since last office visit. He reports eating at least 3x/d and practicing mindful eating behaviors. Pt reports focus on lean protein at meals. He is not doing any exercise due to left knee swelling. Intervention:   Encouraged lean protein focus followed by non-starchy vegetables. Pt did not complete a diet recall, so difficult to assess current intake. Encouraged start of exercise routine utilizing chair exercises to minimize knee involvement. Monitoring and Evaluation:  Monitor for continued safe, supervised weight loss for OM.     F/U per MD Marcela Frost, MS, RD, LD

## 2023-09-13 ENCOUNTER — OFFICE VISIT (OUTPATIENT)
Dept: SURGERY | Age: 53
End: 2023-09-13
Payer: COMMERCIAL

## 2023-09-13 VITALS — BODY MASS INDEX: 36 KG/M2 | HEIGHT: 66 IN | WEIGHT: 224 LBS

## 2023-09-13 DIAGNOSIS — Z98.84 S/P LAPAROSCOPIC SLEEVE GASTRECTOMY: Primary | ICD-10-CM

## 2023-09-13 DIAGNOSIS — G47.33 OSA (OBSTRUCTIVE SLEEP APNEA): ICD-10-CM

## 2023-09-13 DIAGNOSIS — Z71.3 DIETARY COUNSELING AND SURVEILLANCE: ICD-10-CM

## 2023-09-13 DIAGNOSIS — E66.01 CLASS 2 SEVERE OBESITY DUE TO EXCESS CALORIES WITH SERIOUS COMORBIDITY AND BODY MASS INDEX (BMI) OF 35.0 TO 35.9 IN ADULT (HCC): ICD-10-CM

## 2023-09-13 DIAGNOSIS — Z71.82 EXERCISE COUNSELING: ICD-10-CM

## 2023-09-13 DIAGNOSIS — G47.33 OBSTRUCTIVE SLEEP APNEA (ADULT) (PEDIATRIC): ICD-10-CM

## 2023-09-13 PROCEDURE — 99215 OFFICE O/P EST HI 40 MIN: CPT | Performed by: SURGERY

## 2023-09-13 RX ORDER — PHENTERMINE HYDROCHLORIDE 37.5 MG/1
18.75 TABLET ORAL
Qty: 15 TABLET | Refills: 0 | Status: SHIPPED | OUTPATIENT
Start: 2023-09-13 | End: 2023-10-13

## 2023-09-13 RX ORDER — TOPIRAMATE 25 MG/1
TABLET ORAL
Qty: 45 TABLET | Refills: 0 | Status: SHIPPED | OUTPATIENT
Start: 2023-09-13 | End: 2023-10-12

## 2023-10-12 ENCOUNTER — OFFICE VISIT (OUTPATIENT)
Dept: SURGERY | Age: 53
End: 2023-10-12

## 2023-10-12 VITALS — HEIGHT: 66 IN | BODY MASS INDEX: 36.15 KG/M2

## 2023-10-12 DIAGNOSIS — G47.33 OSA (OBSTRUCTIVE SLEEP APNEA): ICD-10-CM

## 2023-10-12 DIAGNOSIS — E66.01 CLASS 2 SEVERE OBESITY DUE TO EXCESS CALORIES WITH SERIOUS COMORBIDITY AND BODY MASS INDEX (BMI) OF 35.0 TO 35.9 IN ADULT (HCC): ICD-10-CM

## 2023-10-12 DIAGNOSIS — G47.33 OBSTRUCTIVE SLEEP APNEA (ADULT) (PEDIATRIC): ICD-10-CM

## 2023-10-12 DIAGNOSIS — Z98.84 S/P LAPAROSCOPIC SLEEVE GASTRECTOMY: Primary | ICD-10-CM

## 2023-10-12 DIAGNOSIS — Z71.3 DIETARY COUNSELING AND SURVEILLANCE: ICD-10-CM

## 2023-10-12 DIAGNOSIS — Z71.82 EXERCISE COUNSELING: ICD-10-CM

## 2023-10-12 RX ORDER — PHENTERMINE HYDROCHLORIDE 37.5 MG/1
18.75 TABLET ORAL
Qty: 15 TABLET | Refills: 2 | Status: SHIPPED | OUTPATIENT
Start: 2023-10-12 | End: 2024-01-10

## 2023-10-12 RX ORDER — TOPIRAMATE 50 MG/1
50 TABLET, FILM COATED ORAL 2 TIMES DAILY
Qty: 180 TABLET | Refills: 0 | Status: SHIPPED | OUTPATIENT
Start: 2023-10-12 | End: 2024-01-10

## 2024-02-23 ENCOUNTER — HOSPITAL ENCOUNTER (OUTPATIENT)
Dept: CT IMAGING | Age: 54
Discharge: HOME | End: 2024-02-23
Attending: UROLOGY
Payer: COMMERCIAL

## 2024-02-23 DIAGNOSIS — C64.9 RENAL CELL CARCINOMA, UNSPECIFIED LATERALITY (HCC): ICD-10-CM

## 2024-02-23 PROCEDURE — 6360000004 HC RX CONTRAST MEDICATION: Performed by: UROLOGY

## 2024-02-23 PROCEDURE — 71260 CT THORAX DX C+: CPT

## 2024-02-23 RX ADMIN — DIATRIZOATE MEGLUMINE AND DIATRIZOATE SODIUM 15 ML: 660; 100 LIQUID ORAL; RECTAL at 10:05

## 2024-02-23 RX ADMIN — IOPAMIDOL 100 ML: 755 INJECTION, SOLUTION INTRAVENOUS at 10:05

## 2024-03-18 ENCOUNTER — OFFICE VISIT (OUTPATIENT)
Dept: ONCOLOGY | Age: 54
End: 2024-03-18
Payer: COMMERCIAL

## 2024-03-18 ENCOUNTER — HOSPITAL ENCOUNTER (OUTPATIENT)
Dept: LAB | Age: 54
Discharge: HOME | End: 2024-03-21
Payer: COMMERCIAL

## 2024-03-18 VITALS
DIASTOLIC BLOOD PRESSURE: 83 MMHG | HEART RATE: 63 BPM | TEMPERATURE: 97.7 F | SYSTOLIC BLOOD PRESSURE: 123 MMHG | BODY MASS INDEX: 36.5 KG/M2 | RESPIRATION RATE: 18 BRPM | OXYGEN SATURATION: 95 % | HEIGHT: 66 IN | WEIGHT: 227.1 LBS

## 2024-03-18 DIAGNOSIS — M54.50 CHRONIC BILATERAL LOW BACK PAIN, UNSPECIFIED WHETHER SCIATICA PRESENT: ICD-10-CM

## 2024-03-18 DIAGNOSIS — R33.9 URINARY RETENTION: ICD-10-CM

## 2024-03-18 DIAGNOSIS — N28.89 RENAL MASS, RIGHT: ICD-10-CM

## 2024-03-18 DIAGNOSIS — G89.29 CHRONIC BILATERAL LOW BACK PAIN, UNSPECIFIED WHETHER SCIATICA PRESENT: ICD-10-CM

## 2024-03-18 DIAGNOSIS — C64.9 RENAL CELL CARCINOMA, UNSPECIFIED LATERALITY (HCC): Primary | ICD-10-CM

## 2024-03-18 LAB
ANION GAP SERPL CALC-SCNC: 3 MMOL/L (ref 2–11)
BUN SERPL-MCNC: 19 MG/DL (ref 6–23)
CALCIUM SERPL-MCNC: 8.4 MG/DL (ref 8.3–10.4)
CHLORIDE SERPL-SCNC: 113 MMOL/L (ref 103–113)
CO2 SERPL-SCNC: 28 MMOL/L (ref 21–32)
CREAT SERPL-MCNC: 0.9 MG/DL (ref 0.8–1.5)
GLUCOSE SERPL-MCNC: 113 MG/DL (ref 65–100)
POTASSIUM SERPL-SCNC: 4 MMOL/L (ref 3.5–5.1)
SODIUM SERPL-SCNC: 144 MMOL/L (ref 136–146)

## 2024-03-18 PROCEDURE — 80048 BASIC METABOLIC PNL TOTAL CA: CPT

## 2024-03-18 PROCEDURE — 99214 OFFICE O/P EST MOD 30 MIN: CPT | Performed by: UROLOGY

## 2024-03-18 PROCEDURE — 36415 COLL VENOUS BLD VENIPUNCTURE: CPT

## 2024-03-18 RX ORDER — TAMSULOSIN HYDROCHLORIDE 0.4 MG/1
0.4 CAPSULE ORAL DAILY
Qty: 30 CAPSULE | Refills: 3 | Status: SHIPPED | OUTPATIENT
Start: 2024-03-18

## 2024-03-18 ASSESSMENT — PATIENT HEALTH QUESTIONNAIRE - PHQ9
SUM OF ALL RESPONSES TO PHQ QUESTIONS 1-9: 0
2. FEELING DOWN, DEPRESSED OR HOPELESS: NOT AT ALL
SUM OF ALL RESPONSES TO PHQ9 QUESTIONS 1 & 2: 0
1. LITTLE INTEREST OR PLEASURE IN DOING THINGS: NOT AT ALL
SUM OF ALL RESPONSES TO PHQ QUESTIONS 1-9: 0

## 2024-03-18 NOTE — PROGRESS NOTES
Urologic Oncology  [facility] Hematology & Oncology  [address] [place] [id number]  [phone]        Mr. Pee Hernandez is a 53 y.o. 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  He is status post right open partial nephrectomy on 2/15/2022.  His pathology was reviewed at [facility] and this was thought to be a low-grade oncocytic neoplasm.  Patient also has a known 5 cm left adrenal myolipoma under observation.      INTERVAL HISTORY:    Mr. Hernandze presents today for follow-up evaluation of his prior oncocytic neoplasm status post right partial nephrectomy on 2/15/2022 and his known 5 cm adrenal myolipoma.    Patient complains of 2 to 3 weeks of low back pain that has been getting worse after moving a patient at time of onset.  He also endorses urinary leakage and states he gets up approximately 2-3 times per night as well as significant fatigue.  He otherwise denies flank pain bilaterally.  No dysuria or hematuria.    From previous note (5/15/23):  He is here today with his wife.  He is status post open partial Nx for what turned out to be a low-grade oncocytic neoplasm.  It measured 6.9 cm.  He also has a 5 cm left adrenal myelolipoma that is under active surveillance.     He complains of some bilateral lower abdominal pain over the last several weeks.  He denies any hematuria or dysuria.     Of note he underwent gastric sleeve procedure approximately 2 years ago.  He has not had his scheduled surveillance imaging.     He denies any constipation or diarrhea.  His appetite is normal.    Past medical, family and social histories, as well as medications and allergies, were reviewed and updated in the medical record as appropriate.    PMH:     Past Medical History:   Diagnosis Date    Chronic pain     Depression     GERD (gastroesophageal reflux disease)     Hypertension     Sleep apnea     C-pap broken, does not use        MEDs:     diclofenac sodium Gel  MULTI-VITAMIN DAILY

## 2024-03-18 NOTE — PATIENT INSTRUCTIONS
I have sent a prescription for Flomax to your pharmacy to help with urinary leakage. We discussed side effects of this including dizziness with standing.    Your CT scan looks good without recurrence of your previously resected mass, stability in the adrenal myolipoma. We will repeat this in 1 year. The radiology department should be calling to schedule this. If you do not hear from them prior to your return visit please call to schedule this 1-2 weeks before you're scheduled to return. The number is: [phone].    I recommend you follow up with your PCP to discuss your low back pain and fatigue.

## 2024-04-09 ENCOUNTER — OFFICE VISIT (OUTPATIENT)
Dept: SURGERY | Age: 54
End: 2024-04-09
Payer: COMMERCIAL

## 2024-04-09 VITALS
HEIGHT: 66 IN | SYSTOLIC BLOOD PRESSURE: 124 MMHG | WEIGHT: 230 LBS | BODY MASS INDEX: 36.96 KG/M2 | DIASTOLIC BLOOD PRESSURE: 80 MMHG | HEART RATE: 69 BPM

## 2024-04-09 DIAGNOSIS — Z71.3 DIETARY COUNSELING AND SURVEILLANCE: ICD-10-CM

## 2024-04-09 DIAGNOSIS — G47.33 OBSTRUCTIVE SLEEP APNEA (ADULT) (PEDIATRIC): ICD-10-CM

## 2024-04-09 DIAGNOSIS — Z71.82 EXERCISE COUNSELING: ICD-10-CM

## 2024-04-09 DIAGNOSIS — G47.33 OSA (OBSTRUCTIVE SLEEP APNEA): ICD-10-CM

## 2024-04-09 DIAGNOSIS — Z98.84 S/P LAPAROSCOPIC SLEEVE GASTRECTOMY: Primary | ICD-10-CM

## 2024-04-09 DIAGNOSIS — E66.01 CLASS 2 SEVERE OBESITY DUE TO EXCESS CALORIES WITH SERIOUS COMORBIDITY AND BODY MASS INDEX (BMI) OF 35.0 TO 35.9 IN ADULT (HCC): ICD-10-CM

## 2024-04-09 PROCEDURE — 99215 OFFICE O/P EST HI 40 MIN: CPT | Performed by: SURGERY

## 2024-04-09 RX ORDER — BUPROPION HYDROCHLORIDE 150 MG/1
150 TABLET ORAL EVERY MORNING
Qty: 60 TABLET | Refills: 0 | Status: SHIPPED | OUTPATIENT
Start: 2024-04-09 | End: 2024-06-08

## 2024-04-09 RX ORDER — TOPIRAMATE 25 MG/1
25 TABLET ORAL 2 TIMES DAILY
Qty: 120 TABLET | Refills: 0 | Status: SHIPPED | OUTPATIENT
Start: 2024-04-09 | End: 2024-06-08

## 2024-04-09 NOTE — PROGRESS NOTES
Nely Goldstein MD   Bariatric & Advanced Laparoscopic Surgery & Endoscopy  [address] [facility], [address]  [place] [address]                                  Office [phone] Fax [phone]        Date of visit: 4/9/2024      History and Physical    Patient: Pee Hernandez MRN: [id number]     YOB: 1970  Age: 53 y.o.  Sex: male              PCP: Baylee Choi MD   Pharmacy:   [id number] [address] -  [phone] - F [phone]  [address] [place] [id number]  Phone: [phone] Fax: [phone]     Date:  4/9/2024        20 month(s) post medication start  He has gained, 6 lbs. since his last office visit.    How is patient taking medication? None         He has been doing well but multiple stressful situations in life.     Clinical Assessment and Physical Exam:    he is doing very well today and is feeling good. He reports that he has been adhering to the protein first diet without difficulty.  He denies any tachycardia, abdominal pain, nausea or vomiting.  He does not report having problems with constipation.    Protein:  10-15 grams per day  Fluids:    60 ounces per day  Physical Activity:  gym 2 hr 3x    Initial Consult Date 7/13/2022   Initial Weight 218   Ideal Body Weight 155   Initial BMI 35.19   Initial Neck Circumference 15.25   Initial Waist Circumference 48.5   Initial Hip Circumference Not measured   Initial BF% 34       Medication none   Medication Start Date 8/19/2022       Today's Weight 104.3 kg (230 lb)   Today's BMI Body mass index is 37.12 kg/m².    Today's Neck Circumference 15   Today's Waist 50.25   Today's Hip 49.25   Today's BF% 36.9       Evaluation of Co-morbid Conditions  Sleep Apnea No   Diabetes No   Hypertension No   GERD No   Hyperlipidemia No        MEDICATIONS:  Current Outpatient Medications   Medication Sig Dispense Refill    diclofenac sodium (VOLTAREN) 1 % GEL

## 2024-04-09 NOTE — PROGRESS NOTES
[facility]ES[facility] MEDICINE NUTRITION REASSESSMENT     Assessment:    Pt reports needing improvement with dietary compliance since last office visit. Pt is eating 1x/d. Pt is drinking water. Pt is exercising via gym for 2 hrs 3 x weekly.   Diet Recall:              Breakfast: Soup and coffee              Lunch: None              Dinner: None     Intervention:   Evaluated diet recall and identified modifications.  Encouraged pt to eat at least 3 x daily with lean protein focus   Encouraged continued and increased activity.          Monitoring and Evaluation:  Monitor for continued safe, supervised weight loss for OM.    F/U per MD Purnima Garcia MBA, Nutritionist, RD eligible

## 2024-06-11 ENCOUNTER — TELEPHONE (OUTPATIENT)
Dept: SURGERY | Age: 54
End: 2024-06-11

## 2024-06-11 NOTE — TELEPHONE ENCOUNTER
Called patient at 1:50 to check to see if they were coming to their appointment. Patient stated they would call back on 06/12/2024 with their work schedule to reschedule.

## 2024-07-12 NOTE — DISCHARGE SUMMARY
[facility] [place] [facility]    Subjective:     Patient ID:  Elissa Raymond is a 69 y.o. female patient here for evaluation Hospital Follow Up (06/07/2024 Loop recorder implant )      HPI:  69-year-old female here for follow-up.  Has a history of stroke in the past and was admitted again with a stroke.  Has a history of TAVR.  Last echo showed well functioning valve with normal EF.  Nonobstructive coronary disease in the past.  Nonobstructive peripheral arterial disease in the past on angiogram.  Had loop recorder placed for recurrent stroke.    Review of Systems   All other systems reviewed and are negative.       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 eyes     Hyperlipidemia     Hypertension     Stroke     x 3    Tobacco use 11/02/2017    Unspecified macular degeneration     Unspecified macular degeneration        Past Surgical History:   Procedure Laterality Date    ANGIOGRAPHY OF LOWER EXTREMITY N/A 11/01/2023    Procedure: ANGIOGRAM BILATERLA LOWER;  Surgeon: Martin Hair MD;  Location: [facility] CATH/EP LAB;  Service: General;  Laterality: N/A;    AORTIC VALVE REPLACEMENT      AORTOGRAPHY WITH EXTREMITY RUNOFF N/A 11/01/2023    Procedure: AORTOGRAM, WITH EXTREMITY RUNOFF;  Surgeon: Martin Hair MD;  Location: [facility] CATH/EP LAB;  Service: General;  Laterality: N/A;    BLADDER SURGERY      BLADDER SURGERY      Lift.    CATARACT EXTRACTION      CHOLECYSTECTOMY      HYSTERECTOMY      INSERTION OF IMPLANTABLE LOOP RECORDER N/A 6/7/2024    Procedure: Insertion, Implantable Loop Recorder;  Surgeon: Todd Agee MD;  Location: [facility] CATH/EP LAB;  Service: Cardiology;  Laterality: N/A;    KNEE SURGERY Left     TONSILLECTOMY         Family History   Problem Relation Name Age of Onset    Diabetes Mother      Kidney disease Mother      Glaucoma Mother      Macular degeneration Mother      Cancer Father      Glaucoma Sister      Macular  Physician Discharge Summary     Patient ID:  Iza Nieto  [id number]  45 y.o.  1970    Allergies: Patient has no known allergies. Admit Date: 12/22/2020    Discharge Date: 12/23/2020    * Admission Diagnoses: Morbid obesity ([facility] [E66.01]    * Discharge Diagnoses:    Hospital Problems as of 12/23/2020 Date Reviewed: 12/8/2020          Codes Class Noted - Resolved POA    Morbid obesity ([facility] ICD-10-CM: E66.01  ICD-9-CM: 278.01  12/22/2020 - Present Unknown               Admission Condition: Good    * Discharge Condition: good    * Procedures: Procedure(s):  ERAS/ GASTRECTOMY SLEEVE LAPAROSCOPIC Hebrew    ESOPHAGOGASTRODUODENOSCOPY (EGD)    * Hospital Course:   Normal hospital course for this procedure. Patient did well post operatively. Patient had normal hemodynamics. No tachycardia. Tolerated clears and protein without issues. Pain and nausea were controlled. Patient was ambulating and was evaluated by PT.      Consults: None    Significant Diagnostic Studies: labs:   Lab Results   Component Value Date/Time    WBC 6.5 12/23/2020 03:18 AM    HGB 12.8 (L) 12/23/2020 03:18 AM    HCT 40.1 (L) 12/23/2020 03:18 AM    PLATELET 466 (L) 44/45/6212 03:18 AM    MCV 81.2 12/23/2020 03:18 AM     Lab Results   Component Value Date/Time    Sodium 140 12/23/2020 03:18 AM    Potassium 4.1 12/23/2020 03:18 AM    Chloride 107 12/23/2020 03:18 AM    CO2 30 12/23/2020 03:18 AM    Anion gap 3 (L) 12/23/2020 03:18 AM    Glucose 84 12/23/2020 03:18 AM    BUN 9 12/23/2020 03:18 AM    Creatinine 0.81 12/23/2020 03:18 AM    BUN/Creatinine ratio 24 (H) 02/11/2020 10:10 AM    GFR est AA >60 12/23/2020 03:18 AM    GFR est non-AA >60 12/23/2020 03:18 AM    Calcium 7.7 (L) 12/23/2020 03:18 AM     Radiology: none    * Disposition: Home    Discharge Medications:   Current Discharge Medication List      CONTINUE these medications which have NOT CHANGED    Details   omeprazole (PRILOSEC) 40 mg capsule Take 1 Cap by mouth daily. Indications: gastroesophageal reflux disease  Qty: 30 Cap, Refills: 2    Associated Diagnoses: Gastroesophageal reflux disease, unspecified whether esophagitis present      ondansetron hcl (ZOFRAN) 8 mg tablet Take 1 Tab by mouth every eight (8) hours as needed for Nausea or Vomiting. Indications: prevent nausea and vomiting after surgery  Qty: 30 Tab, Refills: 0    Associated Diagnoses: Post-operative nausea and vomiting      sucralfate (Carafate) 1 gram tablet Take 1 Tab by mouth four (4) times daily. Indications: gastroesophageal reflux disease  Qty: 120 Tab, Refills: 0    Associated Diagnoses: Gastroesophageal reflux disease, unspecified whether esophagitis present      fluconazole (DIFLUCAN) 150 mg tablet Take 1 tablet now, may repeat 1 more tablet in 1 week if not clear. Qty: 2 Tab, Refills: 0      SITagliptin-metFORMIN (JANUMET)  mg per tablet Take 1 Tab by mouth two (2) times daily (with meals). Indications: type 2 diabetes mellitus  Qty: 180 Tab, Refills: 3    Associated Diagnoses: DM type 2, goal HbA1c < 8% ([facility])      acetaminophen (TYLENOL EXTRA STRENGTH) 500 mg tablet Take  by mouth every six (6) hours as needed for Pain. naproxen (NAPROSYN) 500 mg tablet Take 1 Tab by mouth two (2) times daily as needed for Pain. Indications: pain  Qty: 60 Tab, Refills: 1    Associated Diagnoses: Chronic right shoulder pain; Supraspinatus syndrome of right shoulder      FLUoxetine (PROZAC) 10 mg capsule Take 1 Cap by mouth daily. Indications: Anxiousness associated with Depression  Qty: 90 Cap, Refills: 0    Associated Diagnoses: Anxiety and depression             * Follow-up Care/Patient Instructions:   Activity: Activity as tolerated, No driving while on analgesics, No heavy lifting for 4 weeks, and See surgical instructions  Diet: Bariatric Full liquid diet  Wound Care: Keep wound clean and dry and As directed    Follow-up Information     Follow up With Specialties Details Why Contact Info degeneration Sister      Macular degeneration Brother         Social History     Socioeconomic History    Marital status:    Tobacco Use    Smoking status: Every Day     Current packs/day: 1.00     Average packs/day: 1 pack/day for 54.5 years (54.5 ttl pk-yrs)     Types: Cigarettes     Start date: 1/1/1970    Smokeless tobacco: Never    Tobacco comments:     Pt has smoked now for 54 years and smokes 1pk/day. Down from 4pk/day in the past.  States she has quit since her sickness and it's been a week now. Plans to stay quit.   Substance and Sexual Activity    Drug use: No   Social History Narrative    6/6/18: lives with Arpit, also a patient of mine. There are two dogs at home. She is the only smoker at home. She has two kids, they are is [place].      Social Determinants of Health     Financial Resource Strain: Low Risk  (6/11/2024)    Overall Financial Resource Strain (CARDIA)     Difficulty of Paying Living Expenses: Not hard at all   Food Insecurity: No Food Insecurity (6/11/2024)    Hunger Vital Sign     Worried About Running Out of Food in the Last Year: Never true     Ran Out of Food in the Last Year: Never true   Transportation Needs: No Transportation Needs (6/11/2024)    TRANSPORTATION NEEDS     Transportation : No   Physical Activity: Patient Declined (6/5/2024)    Received from [facility]    Exercise Vital Sign     Days of Exercise per Week: Patient declined     Minutes of Exercise per Session: Patient declined   Stress: Stress Concern Present (6/11/2024)    [place] [employer] - Occupational Stress Questionnaire     Feeling of Stress : To some extent   Housing Stability: Low Risk  (6/11/2024)    Housing Stability Vital Sign     Unable to Pay for Housing in the Last Year: No     Homeless in the Last Year: No       Current Outpatient Medications   Medication Sig Dispense Refill    albuterol (PROVENTIL/VENTOLIN HFA) 90 mcg/actuation inhaler  Inhale 2 puffs into the lungs every 6 (six) hours as needed for Wheezing. 18 g 0    albuterol-ipratropium (DUO-NEB) 2.5 mg-0.5 mg/3 mL nebulizer solution Take 3 mLs by nebulization every 6 (six) hours as needed for Wheezing. Rescue 75 mL 3    aspirin (ECOTRIN) 81 MG EC tablet Take 1 tablet (81 mg total) by mouth once daily. 90 tablet 0    atorvastatin (LIPITOR) 40 MG tablet Take 2 tablets (80 mg total) by mouth every evening. 180 tablet 0    budesonide-formoterol 160-4.5 mcg (SYMBICORT) 160-4.5 mcg/actuation HFAA Inhale 2 puffs into the lungs 2 (two) times daily. 30.6 g 0    CAPSICUM, CAYENNE, ORAL Take 1 capsule by mouth Daily.      cholecalciferol, vitamin D3, 1,250 mcg (50,000 unit) capsule Take 50,000 Units by mouth.      cinnamon bark (CINNAMON) 500 mg capsule Take 500 mg by mouth once daily.      ginger root (GINGER EXTRACT ORAL) Take 1 capsule by mouth once daily.      nicotine (NICODERM CQ) 21 mg/24 hr Place 1 patch onto the skin once daily. 7 patch 0    OXYGEN-AIR DELIVERY SYSTEMS MISC by Misc.(Non-Drug; Combo Route) route. 3 L by nasal route      rivaroxaban (XARELTO) 20 mg Tab Take 1 tablet (20 mg total) by mouth daily with dinner or evening meal. 90 tablet 0    coenzyme Q10 100 mg capsule Take 2 capsules (200 mg total) by mouth once daily. 180 capsule 3     No current facility-administered medications for this visit.       Review of patient's allergies indicates:   Allergen Reactions    Iodinated contrast media Shortness Of Breath    Pcn [penicillins] Other (See Comments)     Unable to obtain         Objective:        Vitals:    07/12/24 1447   BP: 122/70   Pulse: 68       Physical Exam  Vitals reviewed.   Constitutional:       Appearance: Normal appearance.   HENT:      Mouth/Throat:      Mouth: Mucous membranes are moist.   Eyes:      Extraocular Movements: Extraocular movements intact.      Pupils: Pupils are equal, round, and reactive to light.   Cardiovascular:      Rate and Rhythm: Normal rate and  Julián Cabral MD Family Medicine   [address] [place] [address] Dr Juana [facility]  [phone]              Signed:  Metta Kayser, MD  12/23/2020 regular rhythm.      Pulses: Normal pulses.      Heart sounds: Normal heart sounds. No murmur heard.     No gallop.   Pulmonary:      Effort: Pulmonary effort is normal.      Breath sounds: Normal breath sounds.   Abdominal:      General: Bowel sounds are normal.      Palpations: Abdomen is soft.   Musculoskeletal:         General: Normal range of motion.   Skin:     General: Skin is warm and dry.   Neurological:      General: No focal deficit present.      Mental Status: She is alert and oriented to person, place, and time.   Psychiatric:         Mood and Affect: Mood normal.         LIPIDS - LAST 2   Lab Results   Component Value Date    CHOL 193 06/09/2024    CHOL 220 (H) 04/05/2024    HDL 62 06/09/2024    HDL 48 04/05/2024    LDLCALC 108.0 06/09/2024    LDLCALC 150.4 04/05/2024    TRIG 115 06/09/2024    TRIG 108 04/05/2024    CHOLHDL 32.1 06/09/2024    CHOLHDL 21.8 04/05/2024       CBC - LAST 2  Lab Results   Component Value Date    WBC 12.52 06/11/2024    WBC 13.24 (H) 06/10/2024    RBC 4.66 06/11/2024    RBC 4.69 06/10/2024    HGB 14.3 06/11/2024    HGB 14.3 06/10/2024    HCT 43.3 06/11/2024    HCT 44.2 06/10/2024    MCV 93 06/11/2024    MCV 94 06/10/2024    MCH 30.7 06/11/2024    MCH 30.5 06/10/2024    MCHC 33.0 06/11/2024    MCHC 32.4 06/10/2024    RDW 13.8 06/11/2024    RDW 13.9 06/10/2024     (L) 06/11/2024     (L) 06/10/2024    MPV 11.6 06/11/2024    MPV 12.4 06/10/2024    GRAN 7.3 06/11/2024    GRAN 58.0 06/11/2024    LYMPH 3.5 06/11/2024    LYMPH 28.0 06/11/2024    MONO 0.9 06/11/2024    MONO 7.1 06/11/2024    BASO 0.01 06/11/2024    BASO 0.03 06/10/2024    NRBC 0 06/11/2024    NRBC 0 06/10/2024       CHEMISTRY & LIVER FUNCTION - LAST 2  Lab Results   Component Value Date     06/11/2024     06/10/2024    K 4.4 06/11/2024    K 4.1 06/10/2024     06/11/2024     06/10/2024    CO2 24 06/11/2024    CO2 26 06/10/2024    ANIONGAP 7 (L) 06/11/2024    ANIONGAP 7 (L)  06/10/2024    BUN 22 06/11/2024    BUN 25 (H) 06/10/2024    CREATININE 1.0 06/11/2024    CREATININE 1.1 06/10/2024    GLU 92 06/11/2024    GLU 90 06/10/2024    CALCIUM 8.0 (L) 06/11/2024    CALCIUM 8.3 (L) 06/10/2024    MG 2.3 06/10/2024    ALBUMIN 3.2 (L) 06/11/2024    ALBUMIN 3.2 (L) 06/10/2024    PROT 5.4 (L) 06/11/2024    PROT 5.4 (L) 06/10/2024    ALKPHOS 49 (L) 06/11/2024    ALKPHOS 52 (L) 06/10/2024    ALT 14 06/11/2024    ALT 21 06/10/2024    AST 14 06/11/2024    AST 12 06/10/2024    BILITOT 0.5 06/11/2024    BILITOT 0.4 06/10/2024        CARDIAC PROFILE - LAST 2  Lab Results   Component Value Date    BNP 13 09/20/2017    CPK 75 09/20/2017    TROPONINI 0.013 05/16/2018    TROPONINI <0.006 09/20/2017        COAGULATION - LAST 2  Lab Results   Component Value Date    INR 1.0 06/10/2024    INR 1.0 06/09/2024    APTT 30.0 06/10/2024    APTT 27.9 10/30/2023       ENDOCRINE & PSA - LAST 2  Lab Results   Component Value Date    HGBA1C 5.9 08/24/2021    HGBA1C 5.0 06/06/2018    TSH 2.715 06/09/2024    TSH 1.093 06/06/2018        ECHOCARDIOGRAM RESULTS  Results for orders placed during the hospital encounter of 06/09/24    Echo    Interpretation Summary    Left Ventricle: The left ventricle is normal in size. Normal wall thickness. There is normal systolic function with a visually estimated ejection fraction of 65 - 70%. Grade I diastolic dysfunction. E/A ratio is 0.84.    Right Ventricle: Normal right ventricular cavity size. Systolic function is normal.    Mitral Valve: There is mild regurgitation.    Tricuspid Valve: There is mild regurgitation.    Pulmonic Valve: Not assessed.      CURRENT/PREVIOUS VISIT EKG  Results for orders placed or performed during the hospital encounter of 06/09/24   ECG 12 lead    Collection Time: 06/09/24 10:00 AM   Result Value Ref Range    QRS Duration 62 ms    OHS QTC Calculation 429 ms    Narrative    Test Reason : R29.818,    Vent. Rate : 063 BPM     Atrial Rate : 063 BPM     P-R  Int : 138 ms          QRS Dur : 062 ms      QT Int : 420 ms       P-R-T Axes : 023 069 071 degrees     QTc Int : 429 ms    Normal sinus rhythm  Normal ECG  When compared with ECG of 05-APR-2024 10:37,  T wave inversion no longer evident in Inferior leads  T wave inversion no longer evident in Lateral leads  Confirmed by Alcides PHILIPPE, Luis Alfredo BOURGEOIS ([id number]) on 6/10/2024 9:52:37 PM    Referred By: [email or web address]   SELF           Confirmed By:Luis Alfredo Mcgrath MD     No valid procedures specified.   No results found for this or any previous visit.    No valid procedures specified.        Assessment:       1. Cerebrovascular accident (CVA), unspecified mechanism    2. Myalgia due to statin    3. S/p TAVR (transcatheter aortic valve replacement), bioprosthetic    4. Status post placement of implantable loop recorder           Plan:       Cerebrovascular accident (CVA), unspecified mechanism  -     Ambulatory referral/consult to Neurology; Future; Expected date: 07/19/2024    Myalgia due to statin  -     coenzyme Q10 100 mg capsule; Take 2 capsules (200 mg total) by mouth once daily.  Dispense: 180 capsule; Refill: 3    S/p TAVR (transcatheter aortic valve replacement), bioprosthetic    Status post placement of implantable loop recorder    Patient reports she does not have a follow-up with Neurology.  Will refer her to Neurology for history of recent stroke.  Patient reports cramping in her muscles with statin, will try Co Q10, stop Zetia, patient has quit smoking about a month ago.  Loop recorder interrogated today did not show any significant arrhythmias.  Continue monitoring loop recorder for any atrial fibrillation.  Patient is on Xarelto for her recurrent stroke.  No cardiac indication can be found for patient's Xarelto.  Patient's Xarelto will be as per her neurologist or her primary care provider.  This was explained to the patient and to the patient's family recurrently and they verbalized understanding.  Informed them if the  loop recorder shows any atrial fibrillation, we will call them and make sure that they are on systemic anticoagulation.    Follow up in about 3 months (around 10/12/2024) for f/u Myalgia due to statin/ Dyslipidemia, loop recorder.          MD Tamia [facility]

## 2024-08-20 NOTE — PROGRESS NOTES
Nely Goldstein MD   Bariatric & Advanced Laparoscopic Surgery & Endoscopy  [address], [address]  [place] [address]                                  Office [phone] Fax [phone]        Date of visit: 8/21/2024      History and Physical    Patient: Pee Hernandez MRN: [id number]     YOB: 1970  Age: 54 y.o.  Sex: male              PCP: Baylee Choi MD   Pharmacy:   [id number] [address] -  [phone] - [facility] [phone]  [address] [facility] [address]  Phone: [phone] Fax: [phone]     Date:  8/21/2024        24 month(s) post medication start  He has gained, 1 lbs. since his last office visit.    How is patient taking medication? Bupropion/Topiramate         He has been doing well but has not been doing any physical activity. .     Clinical Assessment and Physical Exam:    he is doing well today and is feeling good. He reports that he has been adhering to the protein first diet without difficulty.  He denies any tachycardia, abdominal pain, nausea or vomiting.  He does not report having problems with constipation.    Protein:  60 grams per day  Fluids:    64 ounces per day  Physical Activity:  None    Initial Consult Date 7/13/2022   Initial Weight 218   Ideal Body Weight 155   Initial BMI 35.19   Initial Neck Circumference 15.25   Initial Waist Circumference 48.5   Initial Hip Circumference Not measured   Initial BF% 34       Medication none   Medication Start Date 8/19/2022       Today's Weight 104.8 kg (231 lb)   Today's BMI Body mass index is 37.28 kg/m².    Today's Neck Circumference 15.25   Today's Waist 51   Today's Hip 50   Today's BF% --       Evaluation of Co-morbid Conditions  Sleep Apnea No   Diabetes No   Hypertension No   GERD No   Hyperlipidemia No        MEDICATIONS:  Current Outpatient Medications   Medication Sig Dispense Refill    buPROPion (WELLBUTRIN XL) 150 MG

## 2024-08-21 ENCOUNTER — OFFICE VISIT (OUTPATIENT)
Dept: SURGERY | Age: 54
End: 2024-08-21
Payer: COMMERCIAL

## 2024-08-21 VITALS
DIASTOLIC BLOOD PRESSURE: 78 MMHG | BODY MASS INDEX: 37.12 KG/M2 | HEIGHT: 66 IN | WEIGHT: 231 LBS | SYSTOLIC BLOOD PRESSURE: 132 MMHG | HEART RATE: 69 BPM

## 2024-08-21 DIAGNOSIS — G47.33 OBSTRUCTIVE SLEEP APNEA (ADULT) (PEDIATRIC): ICD-10-CM

## 2024-08-21 DIAGNOSIS — Z71.3 DIETARY COUNSELING AND SURVEILLANCE: ICD-10-CM

## 2024-08-21 DIAGNOSIS — Z98.84 S/P LAPAROSCOPIC SLEEVE GASTRECTOMY: Primary | ICD-10-CM

## 2024-08-21 DIAGNOSIS — G47.33 OSA (OBSTRUCTIVE SLEEP APNEA): ICD-10-CM

## 2024-08-21 DIAGNOSIS — Z71.3 DIETARY COUNSELING: ICD-10-CM

## 2024-08-21 DIAGNOSIS — Z71.82 EXERCISE COUNSELING: ICD-10-CM

## 2024-08-21 DIAGNOSIS — E66.01 CLASS 2 SEVERE OBESITY DUE TO EXCESS CALORIES WITH SERIOUS COMORBIDITY AND BODY MASS INDEX (BMI) OF 35.0 TO 35.9 IN ADULT (HCC): ICD-10-CM

## 2024-08-21 PROCEDURE — 99215 OFFICE O/P EST HI 40 MIN: CPT | Performed by: SURGERY

## 2024-08-21 RX ORDER — BUPROPION HYDROCHLORIDE 150 MG/1
150 TABLET ORAL EVERY MORNING
Qty: 90 TABLET | Refills: 0 | Status: SHIPPED | OUTPATIENT
Start: 2024-08-21 | End: 2024-11-19

## 2024-08-21 RX ORDER — TOPIRAMATE 25 MG/1
25 TABLET ORAL 2 TIMES DAILY
Qty: 180 TABLET | Refills: 0 | Status: SHIPPED | OUTPATIENT
Start: 2024-08-21 | End: 2024-11-19

## 2024-08-21 NOTE — PROGRESS NOTES
[facility] NUTRITION REASSESSMENT     Assessment:    Pt reports good dietary compliance since last office visit. Pt is eating at least 3x/d. Pt is drinking water and coffee. Pt is not exercising due to knee pain.   Diet Recall:              Breakfast: Scrambled eggs              Lunch: Chx and veggies              Dinner: [place]     Intervention:   Evaluated diet recall   Encouraged increased activity- as able.          Monitoring and Evaluation:  Monitor for continued safe, supervised weight loss for OM.    F/U per MD Purnima Garcia MBA. RD, LD

## 2025-02-17 ENCOUNTER — OFFICE VISIT (OUTPATIENT)
Dept: SURGERY | Age: 55
End: 2025-02-17
Payer: COMMERCIAL

## 2025-02-17 VITALS
DIASTOLIC BLOOD PRESSURE: 74 MMHG | SYSTOLIC BLOOD PRESSURE: 138 MMHG | BODY MASS INDEX: 36.8 KG/M2 | WEIGHT: 229 LBS | HEIGHT: 66 IN | HEART RATE: 89 BPM

## 2025-02-17 DIAGNOSIS — Z13.21 ENCOUNTER FOR VITAMIN DEFICIENCY SCREENING: ICD-10-CM

## 2025-02-17 DIAGNOSIS — Z71.82 EXERCISE COUNSELING: ICD-10-CM

## 2025-02-17 DIAGNOSIS — E66.812 OBESITY, CLASS II, BMI 35-39.9: ICD-10-CM

## 2025-02-17 DIAGNOSIS — Z71.3 NUTRITIONAL COUNSELING: ICD-10-CM

## 2025-02-17 DIAGNOSIS — Z98.84 S/P LAPAROSCOPIC SLEEVE GASTRECTOMY: ICD-10-CM

## 2025-02-17 DIAGNOSIS — E66.01 MORBID OBESITY: Primary | ICD-10-CM

## 2025-02-17 PROBLEM — R73.03 PREDIABETES: Status: ACTIVE | Noted: 2024-02-28

## 2025-02-17 PROBLEM — E11.9 DIABETES (HCC): Status: ACTIVE | Noted: 2023-02-27

## 2025-02-17 PROBLEM — D30.01 RENAL ONCOCYTOMA OF RIGHT KIDNEY: Status: ACTIVE | Noted: 2024-02-28

## 2025-02-17 PROBLEM — G25.0 ESSENTIAL TREMOR: Status: ACTIVE | Noted: 2024-02-28

## 2025-02-17 PROCEDURE — 99215 OFFICE O/P EST HI 40 MIN: CPT | Performed by: PHYSICIAN ASSISTANT

## 2025-02-17 RX ORDER — TAMSULOSIN HYDROCHLORIDE 0.4 MG/1
0.4 CAPSULE ORAL DAILY
COMMUNITY
Start: 2024-08-29

## 2025-02-17 NOTE — PROGRESS NOTES
[facility] NUTRITION REASSESSMENT  Assessment:   4 years post-op VSG. Pt consuming ~40g protein/d and ~60oz fluid/d. Pt is eating at least 3x/d. Pt is drinking coffee and water. Pt is not exercising due to knee pain. Pt is taking MVI and Ca supplements as recommended.    Diet Recall:   Breakfast: Eggs   Lunch: Baked ribs with salad   Dinner: None     Intervention:   Evaluated diet recall and identified modifications.  Encouraged pt to increase lean protein intake  Encouraged increased activity - when able.        Monitoring and Evaluation:  Monitor for continued safe, supervised weight loss for VSG.   F/U per MD Purnima Garcia MBA. RD, LD   
sleeve gastrectomy  CBC with Auto Differential    Comprehensive Metabolic Panel    Folate    Ferritin    Iron    Magnesium    Niacin (vitamin B3)    TSH    Vitamin B1, Whole Blood    Vitamin B12    Vitamin B6    Vitamin D 25 Hydroxy      4. Exercise counseling        5. Nutritional counseling        6. Encounter for vitamin deficiency screening  CBC with Auto Differential    Comprehensive Metabolic Panel    Folate    Ferritin    Iron    Magnesium    Niacin (vitamin B3)    TSH    Vitamin B1, Whole Blood    Vitamin B12    Vitamin B6    Vitamin D 25 Hydroxy          Assessment/Plan:    Pee Hernandez is a 54 y.o. male here to follow up s/p laparoscopic sleeve gastrectomy    He is doing well without any major concerns.  He is adhering to the diet and we discussed and addressed all his questions.   I encouraged him to continue physical activity and aim for 300 minutes a week and include 2 strength session a week to maintain the weight loss.  He will continue recommended vitamin/mineral supplementation.  Discussed using Imodium as needed for diarrhea.   Will increase exercise capacity as knee pain improves.   Complete labs for monitoring.   Return to the office in 1 year with myself.    Danya Ferrari PA-C  2/17/2025    Time spent: 40 minutes was spent preparing to see patient (including chart review and preparation), obtaining and/or reviewing additional medical history, performing a physical exam and evaluation, documenting clinical information in the EHR, independently interpreting results, communicating results to patient, family or caregiver, and/or coordinating care.

## 2025-03-17 ENCOUNTER — TELEPHONE (OUTPATIENT)
Dept: ONCOLOGY | Age: 55
End: 2025-03-17

## 2025-03-17 NOTE — TELEPHONE ENCOUNTER
Physician provider: Dr. Gore  Reason for today's call (Please detail here patients chief complaint): order  Last office visit:n/a  Patient Callback Number: [phone]  Was callback number verified?: Yes  Preferred pharmacy (If refill request): n/a  Veriified that patient confirmed no refills left at pharmacy? :No  Calls to office within the last 48 hours?:No    Warm Transfer to (For Red Words): none    Patient notified that their information will be routed to the [facility] clinical triage team for review. Patient is advised that they will receive a phone call from the triage department. If symptom related and symptoms worsen before receiving a call back, the patient has been advised to proceed to the nearest ED.          Pt will need an order for a CT Scan before appt on 03/31/2025    [phone]

## 2025-03-17 NOTE — TELEPHONE ENCOUNTER
Returned call to pt to inform him that the order for the CT scan has already been ordered. He just needs to call and schedule an appt. Pt stated rad scheduling told him he had to wait an hour to call and schedule. Informed him to call them back to let them know the order is already in. Pt verbalized understanding and will call us back if there is an issue.

## 2025-03-26 ENCOUNTER — CLINICAL DOCUMENTATION (OUTPATIENT)
Dept: ONCOLOGY | Age: 55
End: 2025-03-26

## 2025-03-26 DIAGNOSIS — C64.9 RENAL CELL CARCINOMA, UNSPECIFIED LATERALITY: Primary | ICD-10-CM

## 2025-03-26 DIAGNOSIS — N28.89 RENAL MASS, RIGHT: ICD-10-CM

## 2025-03-26 NOTE — PROGRESS NOTES
Detailed VM let with the patient in regards to upcoming appointment being rescheduled due to the CT scan not being completed. I advised the patient to call radiology scheduling to get this scheduled and the number was provided. I also informed the patient of his new appointment day and time and to call the office back if this does not work. He has already been rescheduled twice due to not have the scan completed.

## 2025-04-17 ENCOUNTER — CLINICAL DOCUMENTATION (OUTPATIENT)
Dept: ONCOLOGY | Age: 55
End: 2025-04-17

## 2025-04-17 NOTE — PROGRESS NOTES
Detailed VM let with the patient in regards to upcoming appointment being rescheduled due to the CT scan not being completed. I advised the patient to call radiology scheduling to get this scheduled and the number was provided. I also informed the patient of his new appointment day and time and to call the office back if this does not work. He has already been rescheduled 3 times due to not have the scan completed.

## 2025-07-02 ENCOUNTER — OFFICE VISIT (OUTPATIENT)
Dept: SURGERY | Age: 55
End: 2025-07-02
Payer: COMMERCIAL

## 2025-07-02 VITALS
DIASTOLIC BLOOD PRESSURE: 79 MMHG | SYSTOLIC BLOOD PRESSURE: 136 MMHG | WEIGHT: 242 LBS | BODY MASS INDEX: 38.89 KG/M2 | HEART RATE: 77 BPM | HEIGHT: 66 IN

## 2025-07-02 DIAGNOSIS — E66.01 CLASS 2 SEVERE OBESITY DUE TO EXCESS CALORIES WITH SERIOUS COMORBIDITY AND BODY MASS INDEX (BMI) OF 35.0 TO 35.9 IN ADULT (HCC): ICD-10-CM

## 2025-07-02 DIAGNOSIS — Z71.3 NUTRITIONAL COUNSELING: ICD-10-CM

## 2025-07-02 DIAGNOSIS — E66.812 CLASS 2 SEVERE OBESITY DUE TO EXCESS CALORIES WITH SERIOUS COMORBIDITY AND BODY MASS INDEX (BMI) OF 35.0 TO 35.9 IN ADULT (HCC): ICD-10-CM

## 2025-07-02 DIAGNOSIS — Z98.84 S/P LAPAROSCOPIC SLEEVE GASTRECTOMY: ICD-10-CM

## 2025-07-02 DIAGNOSIS — Z71.3 DIETARY COUNSELING: ICD-10-CM

## 2025-07-02 DIAGNOSIS — Z71.82 EXERCISE COUNSELING: ICD-10-CM

## 2025-07-02 DIAGNOSIS — E66.812 OBESITY, CLASS II, BMI 35-39.9: ICD-10-CM

## 2025-07-02 DIAGNOSIS — G47.33 OSA (OBSTRUCTIVE SLEEP APNEA): Primary | ICD-10-CM

## 2025-07-02 PROCEDURE — 99215 OFFICE O/P EST HI 40 MIN: CPT | Performed by: SURGERY

## 2025-07-02 RX ORDER — BUPROPION HYDROCHLORIDE 150 MG/1
150 TABLET ORAL EVERY MORNING
Qty: 90 TABLET | Refills: 0 | Status: SHIPPED | OUTPATIENT
Start: 2025-07-02

## 2025-07-02 RX ORDER — TOPIRAMATE 25 MG/1
TABLET, FILM COATED ORAL
Qty: 165 TABLET | Refills: 0 | Status: SHIPPED | OUTPATIENT
Start: 2025-07-02 | End: 2025-09-30

## 2025-07-02 RX ORDER — TIRZEPATIDE 2.5 MG/.5ML
2.5 INJECTION, SOLUTION SUBCUTANEOUS WEEKLY
Qty: 2 ML | Refills: 0 | Status: SHIPPED | OUTPATIENT
Start: 2025-07-02 | End: 2025-08-01

## 2025-07-02 NOTE — PROGRESS NOTES
Nely Goldstein MD   [facility]  [address], [address]  [place] [address]                                  Office [phone] Fax [phone]        Date of visit: 7/2/2025      History and Physical    Patient: Pee Hernandez MRN: [id number]     YOB: 1970  Age: 55 y.o.  Sex: male              PCP: Baylee Choi MD   Pharmacy:   [id number] [address] [phone] - F [phone]  [address] [id number]  Phone: [phone] Fax: [phone]     Date:  7/2/2025        35 month(s) post medication start  He has gained, 11 lbs. since his last office visit.    How is patient taking medication? None         He has been doing well but has not been doing any physical activity. .     Clinical Assessment and Physical Exam:    he is doing well today and is feeling good. He reports that he has been adhering to the protein first diet without difficulty.  He denies any tachycardia, abdominal pain, nausea or vomiting.  He does not report having problems with constipation.    Protein:  40 grams per day  Fluids:    60 ounces per day  Physical Activity:  some walking    Last Non-Surgical Weight Loss:      6/27/2025    11:08 AM   Non-Surgical Weight Loss Tracker   Consult Date 7/13/2022   Initial Height 5' 6\"   Initial Weight 218 lbs   Ideal Body Weight 155 lb   Initial BMI 35.2   Initial EBW 63 lb   Non-Surgical Initial % Body Fat  34%   Initial Neck Circumference 15.25   Initial Waist Circumference 48.5   Initial Hip Circumference 48   Is patient taking anti-obesity Meds? No   Date 7/2/2025   Weight 242 lb   BMI 39.1   Weight Change since last Visit 24 lb   Weight Change since Initial Consult 0 lb   % EBWL <UNK>   % Total Body Weight Loss  0%   Non-Surgical Subsequent Eval % Body Fat  40%   Subsequent Neck Circumference(inches) 15.5   Subsequent Waist

## 2025-07-02 NOTE — PROGRESS NOTES
[facility] NUTRITION REASSESSMENT     Assessment:    Pt reports struggling with dietary compliance since last office visit. Pt is sometimes eating at least 3x/d. Pt is drinking water. Pt is exercising via some walking - reports recent healed leg fracture. 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  Diet Recall:              Breakfast: Fruit and yogurt              Lunch: Chx and rice              Dinner: NOne     Intervention:   Evaluated diet recall and identified modifications.  Encouraged increased lean protein   Encouraged continued and increased activity- as able.          Monitoring and Evaluation:  Monitor for continued safe, supervised weight loss for OM.    F/U per MD Purnima Garcia MBA. RD, LD

## 2025-07-02 NOTE — CONSULTS
Session ID: [id number]  Session Duration: 16 minutes  Language: Armenian   ID: #[id number]   Name: Bertha

## (undated) DEVICE — DERMABOND SKIN ADH 0.7ML -- DERMABOND ADVANCED 12/BX

## (undated) DEVICE — COVER,LIGHT HANDLE,FLX,2/PK: Brand: MEDLINE INDUSTRIES, INC.

## (undated) DEVICE — RESERVOIR,SUCTION,100CC,SILICONE: Brand: MEDLINE

## (undated) DEVICE — SOLUTION IRRIG 3000ML 0.9% SOD CHL FLX CONT 0797208] ICU MEDICAL INC]

## (undated) DEVICE — SUTURING DEVICE: Brand: ENDO STITCH

## (undated) DEVICE — GARMENT,MEDLINE,DVT,INT,CALF,LG, GEN2: Brand: MEDLINE

## (undated) DEVICE — MAGNETIC DRAPE: Brand: DEVON

## (undated) DEVICE — LUBE JELLY FOIL PACK 1.4 OZ: Brand: MEDLINE INDUSTRIES, INC.

## (undated) DEVICE — CONTAINER SPEC FRMLN 120ML --

## (undated) DEVICE — SUTURE VCRL SZ 3-0 L27IN ABSRB UD L26MM SH 1/2 CIR J416H

## (undated) DEVICE — DRAPE,UNDERBUTTOCKS,PCH,STERILE: Brand: MEDLINE

## (undated) DEVICE — SYRINGE MED 3ML CLR PLAS STD N CTRL LUERLOCK TIP DISP

## (undated) DEVICE — DRAPE IRRIG FLD WRM W44XL44IN W/ AORN STD PRTBL INTRATEMP

## (undated) DEVICE — REM POLYHESIVE ADULT PATIENT RETURN ELECTRODE: Brand: VALLEYLAB

## (undated) DEVICE — STRIP,CLOSURE,WOUND,MEDI-STRIP,1/2X4: Brand: MEDLINE

## (undated) DEVICE — CLIP SUT ENDOSCP F/2-0/3-0/4-0 -- LAPRA-TY

## (undated) DEVICE — Device

## (undated) DEVICE — RELOAD STPL L60MM H1.5-3.6MM REG TISS BLU GRIPPING SURF B

## (undated) DEVICE — AIRLIFE™ OXYGEN TUBING 7 FEET (2.1 M) CRUSH RESISTANT OXYGEN TUBING, VINYL TIPPED: Brand: AIRLIFE™

## (undated) DEVICE — VISUALIZATION SYSTEM: Brand: CLEARIFY

## (undated) DEVICE — TUBING INSUFFLATION SMK EVAC HI FLO SET PNEUMOCLEAR

## (undated) DEVICE — GARMENT,MEDLINE,DVT,INT,CALF,MED, GEN2: Brand: MEDLINE

## (undated) DEVICE — SOLUTION IRRIG 1000ML 09% SOD CHL USP PIC PLAS CONTAINER

## (undated) DEVICE — DRAIN SURG L49IN DIA1/8IN 10IN H SIL W/O TRCR END PERF

## (undated) DEVICE — ENDOLOOP SUT LIGATURE 18IN -- 12/BX PDS II

## (undated) DEVICE — SUTURE SZ 0 27IN 5/8 CIR UR-6  TAPER PT VIOLET ABSRB VICRYL J603H

## (undated) DEVICE — AGENT HEMSTAT W3XL4IN OXIDIZED REGENERATED CELOS ABSRB FOR

## (undated) DEVICE — INSTRUMENT REPROC SEAL/DIVIDE OPEN LIGASURE IMPACT CRV LG JAW NANO-COAT 18CM

## (undated) DEVICE — DRAPE,INSTRUMENT,MAGNETIC,10X16: Brand: MEDLINE

## (undated) DEVICE — TROCAR: Brand: KII® SLEEVE

## (undated) DEVICE — APPLICATOR MEDICATED 26 CC SOLUTION CLR STRL CHLORAPREP

## (undated) DEVICE — GAUZE,SPONGE,4"X4",12PLY,WOVEN,NS,LF: Brand: MEDLINE

## (undated) DEVICE — GENERAL LAPAROSCOPY: Brand: MEDLINE INDUSTRIES, INC.

## (undated) DEVICE — DEVICE SUT VLT PRELD W/ POLYSRB 2-0 L48IN SUT DISP FOR LAP

## (undated) DEVICE — LAPAROSCOPIC TROCAR SLEEVE/SINGLE USE: Brand: KII® OPTICAL ACCESS SYSTEM

## (undated) DEVICE — CONNECTOR TBNG OD5-7MM O2 END DISP

## (undated) DEVICE — RELOAD STPL H1.8-3.8MM REG THCK TISS G 6 ROW GRIPPING SURF

## (undated) DEVICE — SYRINGE MED 10ML LUERLOCK TIP W/O SFTY DISP

## (undated) DEVICE — POUCH SPEC RETRV SHFT 15MM 13X23CM GRN POLYUR DBL RNG HNDL

## (undated) DEVICE — SYRINGE, LUER SLIP, STERILE, 60ML: Brand: MEDLINE

## (undated) DEVICE — SINGLE PORT MANIFOLD: Brand: NEPTUNE 2

## (undated) DEVICE — STAPLE INT WHT BLU G GRN BLK REINF FOR ENDOPATH ECHELON FLX

## (undated) DEVICE — CONTAINER,SPECIMEN,O.R.STRL,4.5OZ: Brand: MEDLINE

## (undated) DEVICE — YANKAUER,BULB TIP,W/O VENT,RIGID,STERILE: Brand: MEDLINE

## (undated) DEVICE — SYRINGE IRRIG 60ML SFT PLIABLE BLB EZ TO GRP 1 HND USE W/

## (undated) DEVICE — LOGICUT SCISSOR LENGTH 320MM: Brand: LOGI - LAPAROSCOPIC INSTRUMENT SYSTEM

## (undated) DEVICE — TROCAR: Brand: KII FIOS FIRST ENTRY

## (undated) DEVICE — KENDALL RADIOLUCENT FOAM MONITORING ELECTRODE RECTANGULAR SHAPE: Brand: KENDALL

## (undated) DEVICE — THE TORRENT IRRIGATION TUBING IS INTENDED TO PROVIDE IRRIGATION VIA IRRIGATION FLUIDS, SUCH AS STERILE WATER, DURING GASTROINTESTINAL ENDOSCOPIC PROCEDURES WHEN USED IN CONJUNCTION WITH AN IRRIGATION PUMP OR ELECTROSURGICAL UNIT.: Brand: TORRENT

## (undated) DEVICE — SUTURE MCRYL SZ 4-0 L27IN ABSRB UD L19MM PS-2 1/2 CIR PRIM Y426H

## (undated) DEVICE — 1010 S-DRAPE TOWEL DRAPE 10/BX: Brand: STERI-DRAPE™

## (undated) DEVICE — SPONGE GZ W4XL4IN COT 12 PLY TYP VII WVN C FLD DSGN

## (undated) DEVICE — SOLUTION IV 1000ML 0.9% SOD CHL

## (undated) DEVICE — DRAIN SURG 19FR 100% SIL RADPQ RND CHN FULL FLUT

## (undated) DEVICE — SYRINGE CATH TIP 50ML

## (undated) DEVICE — SPONGE: SPECIALTY PEANUT XR 100/CS: Brand: MEDICAL ACTION INDUSTRIES

## (undated) DEVICE — 2, DISPOSABLE SUCTION/IRRIGATOR WITHOUT DISPOSABLE TIP: Brand: STRYKEFLOW

## (undated) DEVICE — NEEDLE SYR 18GA L1.5IN RED PLAS HUB S STL BLNT FILL W/O

## (undated) DEVICE — TROCARS: Brand: KII® BALLOON BLUNT TIP SYSTEM

## (undated) DEVICE — SHEAR HARMONIC 5MMX45CM -- ACE 7+

## (undated) DEVICE — LOGICUT SCISSOR LENGTH 450MM: Brand: LOGI - LAPAROSCOPIC INSTRUMENT SYSTEM

## (undated) DEVICE — GASTRIC SLEEVE: Brand: MEDLINE INDUSTRIES, INC.

## (undated) DEVICE — PREP SKN CHLRAPRP APL 26ML STR --

## (undated) DEVICE — 2000CC GUARDIAN II: Brand: GUARDIAN

## (undated) DEVICE — CARDINAL HEALTH FLEXIBLE LIGHT HANDLE COVER: Brand: CARDINAL HEALTH

## (undated) DEVICE — BAG SPEC REM 224ML W4XL6IN DIA10MM 1 HND GYN DISP ENDOPCH

## (undated) DEVICE — MAJOR GENERAL: Brand: MEDLINE INDUSTRIES, INC.

## (undated) DEVICE — SMARTSLEEVE SURGICAL GOWN, 2XL: Brand: CONVERTORS

## (undated) DEVICE — SPONGE LAP 18X18IN STRL -- 5/PK

## (undated) DEVICE — DRAPE SHT 3 QTR PROXIMA 53X77 --

## (undated) DEVICE — MASTISOL ADHESIVE LIQ 2/3ML

## (undated) DEVICE — CANNULA NSL ORAL AD FOR CAPNOFLEX CO2 O2 AIRLFE

## (undated) DEVICE — STAPLER SKIN L440MM 32MM LNG 12 FIRING B FRM PWR + GRIPPING

## (undated) DEVICE — NEEDLE HYPO 21GA L1.5IN INTRAMUSCULAR S STL LATCH BVL UP

## (undated) DEVICE — KIT,ANTI FOG,W/SPONGE & FLUID,SOFT PACK: Brand: MEDLINE

## (undated) DEVICE — APPLICATOR COT-TIP WOOD 6IN -- NON STRL

## (undated) DEVICE — INSUFFLATION NEEDLE TO ESTABLISH PNEUMOPERITONEUM.: Brand: INSUFFLATION NEEDLE

## (undated) DEVICE — APPLIER CLP L SHFT DIA12MM 20 ROT MULT LIGACLP